# Patient Record
Sex: MALE | Race: WHITE | NOT HISPANIC OR LATINO | Employment: UNEMPLOYED | URBAN - METROPOLITAN AREA
[De-identification: names, ages, dates, MRNs, and addresses within clinical notes are randomized per-mention and may not be internally consistent; named-entity substitution may affect disease eponyms.]

---

## 2020-09-22 ENCOUNTER — TELEPHONE (OUTPATIENT)
Dept: PRIMARY CARE | Facility: CLINIC | Age: 30
End: 2020-09-22

## 2020-09-22 NOTE — TELEPHONE ENCOUNTER
Pt is scheduled for 10/19/2020 for a new pt visit. Asking if it is ok if he brings an emotional support person with him that day. I told him I would ask since we are requesting that pt's come alone due to COVID.

## 2020-10-19 ENCOUNTER — OFFICE VISIT (OUTPATIENT)
Dept: PRIMARY CARE | Facility: CLINIC | Age: 30
End: 2020-10-19
Payer: COMMERCIAL

## 2020-10-19 VITALS
HEART RATE: 80 BPM | DIASTOLIC BLOOD PRESSURE: 80 MMHG | TEMPERATURE: 97 F | WEIGHT: 171 LBS | BODY MASS INDEX: 23.94 KG/M2 | SYSTOLIC BLOOD PRESSURE: 140 MMHG | HEIGHT: 71 IN

## 2020-10-19 DIAGNOSIS — R53.83 FATIGUE, UNSPECIFIED TYPE: ICD-10-CM

## 2020-10-19 DIAGNOSIS — F41.9 ANXIETY AND DEPRESSION: ICD-10-CM

## 2020-10-19 DIAGNOSIS — F10.982 ALCOHOL-INDUCED INSOMNIA (CMS/HCC): ICD-10-CM

## 2020-10-19 DIAGNOSIS — Z23 NEED FOR IMMUNIZATION AGAINST INFLUENZA: ICD-10-CM

## 2020-10-19 DIAGNOSIS — Z00.01 ENCOUNTER FOR PREVENTATIVE ADULT HEALTH CARE EXAM WITH ABNORMAL FINDINGS: ICD-10-CM

## 2020-10-19 DIAGNOSIS — Z13.220 SCREENING CHOLESTEROL LEVEL: ICD-10-CM

## 2020-10-19 DIAGNOSIS — F10.220: Primary | ICD-10-CM

## 2020-10-19 DIAGNOSIS — F32.A ANXIETY AND DEPRESSION: ICD-10-CM

## 2020-10-19 PROBLEM — J30.89 ENVIRONMENTAL AND SEASONAL ALLERGIES: Status: ACTIVE | Noted: 2020-10-19

## 2020-10-19 PROCEDURE — 90686 IIV4 VACC NO PRSV 0.5 ML IM: CPT | Performed by: FAMILY MEDICINE

## 2020-10-19 PROCEDURE — 99204 OFFICE O/P NEW MOD 45 MIN: CPT | Mod: 25 | Performed by: FAMILY MEDICINE

## 2020-10-19 PROCEDURE — 90471 IMMUNIZATION ADMIN: CPT | Performed by: FAMILY MEDICINE

## 2020-10-19 RX ORDER — ESCITALOPRAM OXALATE 10 MG/1
10 TABLET ORAL DAILY
Qty: 30 TABLET | Refills: 2 | Status: SHIPPED | OUTPATIENT
Start: 2020-10-19 | End: 2020-11-16 | Stop reason: SDUPTHER

## 2020-10-19 SDOH — ECONOMIC STABILITY: FOOD INSECURITY: WITHIN THE PAST 12 MONTHS, THE FOOD YOU BOUGHT JUST DIDN'T LAST AND YOU DIDN'T HAVE MONEY TO GET MORE.: NEVER TRUE

## 2020-10-19 SDOH — HEALTH STABILITY: MENTAL HEALTH: HOW MANY DRINKS CONTAINING ALCOHOL DO YOU HAVE ON A TYPICAL DAY WHEN YOU ARE DRINKING?: 3 OR 4

## 2020-10-19 SDOH — ECONOMIC STABILITY: FOOD INSECURITY: WITHIN THE PAST 12 MONTHS, YOU WORRIED THAT YOUR FOOD WOULD RUN OUT BEFORE YOU GOT THE MONEY TO BUY MORE.: NEVER TRUE

## 2020-10-19 SDOH — HEALTH STABILITY: MENTAL HEALTH: HOW OFTEN DO YOU HAVE A DRINK CONTAINING ALCOHOL?: 4 OR MORE TIMES A WEEK

## 2020-10-19 SDOH — HEALTH STABILITY: MENTAL HEALTH: HOW OFTEN DO YOU HAVE SIX OR MORE DRINKS ON ONE OCCASION?: LESS THAN MONTHLY

## 2020-10-19 SDOH — ECONOMIC STABILITY: FOOD INSECURITY: HOW HARD IS IT FOR YOU TO PAY FOR THE VERY BASICS LIKE FOOD, HOUSING, MEDICAL CARE, AND HEATING?: NOT HARD AT ALL

## 2020-10-19 SDOH — ECONOMIC STABILITY: TRANSPORTATION INSECURITY: IN THE PAST 12 MONTHS, HAS LACK OF TRANSPORTATION KEPT YOU FROM MEDICAL APPOINTMENTS OR FROM GETTING MEDICATIONS?: NO

## 2020-10-19 ASSESSMENT — ENCOUNTER SYMPTOMS
ALLERGIC/IMMUNOLOGIC NEGATIVE: 1
RESPIRATORY NEGATIVE: 1
FATIGUE: 1
CARDIOVASCULAR NEGATIVE: 1
FEVER: 0
GASTROINTESTINAL NEGATIVE: 1
EYES NEGATIVE: 1
MUSCULOSKELETAL NEGATIVE: 1
HEMATOLOGIC/LYMPHATIC NEGATIVE: 1
CHILLS: 0
UNEXPECTED WEIGHT CHANGE: 1
SEIZURES: 0
ENDOCRINE NEGATIVE: 1
NEUROLOGICAL NEGATIVE: 1
PSYCHIATRIC NEGATIVE: 1

## 2020-10-19 ASSESSMENT — ACTIVITIES OF DAILY LIVING (ADL): LACK_OF_TRANSPORTATION: NO

## 2020-10-19 NOTE — PROGRESS NOTES
"Subjective      Patient ID: Jeff Hobbs is a 30 y.o. male     HPI: He presents today to establish care with this practice.  He reports that he feels generally a lower level of energy, he also reports difficulty falling asleep, however once he is asleep he is able to stay asleep.  He also expresses concern about his use of alcohol especially in the evenings because he feels as though he needs to have an alcoholic beverage just to be able to fall asleep this has been ongoing for 7 years or so.  Feels as though he has developed a degree of dependence on alcohol.  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  He does plan on getting the flu shot today.    He reports that in April and May, he tells me that he was vomiting just about every day during that.  And experienced some weight loss at least 15 pounds.    The following have been reviewed and updated as appropriate in this visit:  Tobacco  Allergies  Meds  Problems  Surg Hx  Fam Hx  Soc Hx      Review of Systems   Constitutional: Positive for fatigue and unexpected weight change. Negative for chills and fever.   HENT: Negative.    Eyes: Negative.    Respiratory: Negative.    Cardiovascular: Negative.    Gastrointestinal: Negative.    Endocrine: Negative.    Genitourinary: Negative.    Musculoskeletal: Negative.    Skin: Negative.    Allergic/Immunologic: Negative.    Neurological: Negative.  Negative for seizures.   Hematological: Negative.    Psychiatric/Behavioral: Negative.      Vitals:    10/19/20 1335   BP: 140/80   BP Location: Left upper arm   Patient Position: Sitting   Pulse: 80   Temp: 36.1 °C (97 °F)   Weight: 77.6 kg (171 lb)   Height: 1.803 m (5' 11\")      Current Outpatient Medications   Medication Sig Dispense Refill   • escitalopram (LEXAPRO) 10 mg tablet Take 1 tablet (10 mg total) by mouth daily. 30 tablet 2     No current facility-administered medications for this " visit.       Social History     Tobacco Use   • Smoking status: Current Every Day Smoker     Types: Electronic Cigarette   • Smokeless tobacco: Never Used   Substance Use Topics   • Alcohol use: Yes     Alcohol/week: 14.0 standard drinks     Types: 14 Shots of liquor per week     Frequency: 4 or more times a week     Drinks per session: 3 or 4     Binge frequency: Less than monthly     Comment: His girlfriend reports that if he does not have some alcoholic beverages especially in the evening he cannot sleep.   • Drug use: Never      Family History   Problem Relation Age of Onset   • Diabetes Biological Mother    • Heart disease Biological Mother    • No Known Problems Biological Father    • No Known Problems Biological Brother    • Heart disease Maternal Grandmother         No past medical history on file.     Objective     Physical Exam  Constitutional:       Appearance: Normal appearance. He is normal weight.   HENT:      Right Ear: Tympanic membrane, ear canal and external ear normal. There is no impacted cerumen.      Left Ear: Tympanic membrane, ear canal and external ear normal. There is no impacted cerumen.      Nose: Nose normal.      Mouth/Throat:      Mouth: Mucous membranes are moist.   Eyes:      Conjunctiva/sclera: Conjunctivae normal.   Neck:      Musculoskeletal: Neck supple.   Cardiovascular:      Rate and Rhythm: Normal rate and regular rhythm.      Heart sounds: Normal heart sounds.   Pulmonary:      Effort: Pulmonary effort is normal.      Breath sounds: Normal breath sounds.   Abdominal:      Palpations: Abdomen is soft.   Skin:     General: Skin is warm.   Neurological:      Mental Status: He is alert and oriented to person, place, and time.   Psychiatric:         Mood and Affect: Mood normal.         Behavior: Behavior normal.         Thought Content: Thought content normal.         Judgment: Judgment normal.         Problem List Items Addressed This Visit        Nervous    Alcohol-induced  insomnia (CMS/HCC)    Relevant Medications    escitalopram (LEXAPRO) 10 mg tablet    Other Relevant Orders    Comprehensive metabolic panel    Alcohol dependence with acute alcoholic intoxication without complication (CMS/HCC)    Relevant Medications    escitalopram (LEXAPRO) 10 mg tablet       Other    Fatigue    Relevant Orders    CBC    TSH w reflex FT4    Anxiety and depression    Relevant Medications    escitalopram (LEXAPRO) 10 mg tablet      Other Visit Diagnoses     Encounter for preventative adult health care examination    -  Primary    Relevant Orders    Influenza vaccine quadrivalent preservative free 6 month and older IM    Lipid panel    Need for immunization against influenza        Relevant Orders    Influenza vaccine quadrivalent preservative free 6 month and older IM          Assessment/Plan     Adult preventive exam: He presents as a new patient today.  We discussed a variety of issues including disease and injury prevention.  He will be getting the flu shot today and we discussed alcohol and tobacco use, he does want to eventually quit drinking alcohol and he does acknowledge that it has become a serious problem.  He also smokes and currently uses electronic cigarettes although he did smoke regular cigarettes prior to this.  As the importance of alcohol and tobacco cessation on his long-term health.    Decreased energy: I will check blood work as noted to make sure there is not a hematologic or metabolic cause for his symptoms.  He does have underlying insomnia and this certainly could be playing a role.  He admits that alcohol is necessary for him to fall asleep but once he is asleep he is able to maintain sleep.  He has tried melatonin in the past and I would like for him to start using this again.    Anxiety/depression: Ports that he is always been somewhat of an anxious person but I do believe that there is some underlying depression as well.  We discussed this and I do believe that he would  benefit from a low-dose SSRI.  I will go ahead and put in a prescription for Lexapro and I explained to him expectations and how to take this medication.  We will see each other in about 4 weeks for very close follow-up.    Alcohol dependence: We discussed the importance of ultimately quitting alcohol together and the positive impact successful cessation will have on his long-term life expectancy.  At this juncture I need to check his blood work and we did briefly discuss some options available to help with alcohol cessation.  He advised to start gradually cutting back on the quantity of alcohol that he uses moving forward.  He is to avoid any kind of cold turkey treatments.

## 2020-10-19 NOTE — PATIENT INSTRUCTIONS
VACCINE INFORMATION STATEMENT     Influenza (Flu) Vaccine (Inactivated or Recombinant): What you need to know     Many Vaccine Information Statements are available in [place] and other languages. See  www.immunize.org/vis    Hojas de información sobre vacunas están disponibles en español y en muchos otrosidiomas. Visite www.immunize.org/vis     1. Why Get Vaccinated?  Influenza vaccine can prevent influenza (flu).  Flu is a contagious disease that spreads around the [place] every year, usually between October and May. Anyone can get the flu, but it is more dangerous for some people. Infants and young children, people 65 years of age and older, pregnant women, and people with certain health conditions or a weakened immune system are at greatest risk of flu complications.  Pneumonia, bronchitis, sinus infections and ear infections are examples of flu-related complications. If you have a medical condition, such as heart disease, cancer or diabetes, flu can make it worse.  Flu can cause fever and chills, sore throat, muscle aches, fatigue, cough, headache, and runny or stuffy nose. Some people may have vomiting and diarrhea, though this is more common in children than adults.  Each year thousands of people in the [place] die from flu, and many more are hospitalized. Flu vaccine prevents millions of illnesses and flu-related visits to the doctor each year.    2. Influenza vaccine  CDC recommends everyone 6 months of age and older get vaccinated every flu season. Children  6 months through 8 years of age may need 2 doses during a single flu season. Everyone else needs only 1 dose each flu season.  It takes about 2 weeks for protection to develop after vaccination.  There are many flu viruses, and they are always changing. Each year a new flu vaccine is made to protect against three or four viruses that are likely to cause disease in the upcoming flu season. Even when the vaccine doesn't exactly match  these viruses, it may still provide some protection.  Influenza vaccine does not cause flu.  Influenza vaccine may be given at the same time as other vaccines.    3. Talk with your health care provider   Tell your vaccine provider if the person getting the vaccine:  Has had an allergic reaction after a previous dose of influenza vaccine, or has any severe, life- threatening allergies.  Has ever had Guillain-Barré Syndrome (also called GBS).  In some cases, your health care provider may decide to postpone influenza vaccination to a future visit.  People with minor illnesses, such as a cold, may be vaccinated. People who are moderately or severely ill should usually wait until they recover before getting influenza vaccine.  Your health care provider can give you more information.    4. Risks of vaccine reaction  Soreness, redness, and swelling where shot is given, fever, muscle aches, and headache can happen after influenza vaccine.  There may be a very small increased risk of Guillain-Barré Syndrome (GBS) after inactivated influenza vaccine (the flu shot).      Young children who get the flu shot along with pneumococcal vaccine (PCV13), and/or DTaP vaccine at the same time might be slightly more likely to have a seizure caused by fever. Tell your health care provider if a child who is getting flu vaccine has ever had a seizure.  People sometimes faint after medical procedures, including vaccination. Tell your provider if you feel dizzy or have vision changes or ringing in the ears.  As with any medicine, there is a very remote chance of a vaccine causing a severe allergic reaction, other serious injury, or death.     5. What if there is a serious problem?  An allergic reaction could occur after the vaccinated person leaves the clinic. If you see signs of a  severe allergic reaction (hives, swelling of the face and throat, difficulty breathing, a fast heartbeat, dizziness, or weakness), call 9-1-1 and get the person  to the nearest hospital.  For other signs that concern you, call your health care provider.  Adverse reactions should be reported to the Vaccine Adverse Event Reporting System (VAERS). Your health care provider will usually file this report, or you can do it yourself. Visit the VAERS website at www.vaers.[facility].gov or call 1-777.513.5404. VAERS is only for reporting reactions, and VAERS staff do not give medical advice.    6. The National Vaccine Injury Compensation Program  The National Vaccine Injury Compensation Program (VICP) is a federal program that was created to compensate people who may have been injured by certain vaccines. Visit the VICP website at www.[facility].gov/vaccinecompensation or call 1-236.956.9711 to learn about the program and about filing a claim. There is a time limit to file a claim for compensation.    7. How can I learn more?  Ask your healthcare provider.  Call your local or state health department.  Contact the Centers for Disease Control and Prevention (CDC):  - Call 1-699.628.2330 (3-315-XGB-INFO) or  - Visit CDC's www.cdc.gov/flu       U.S. Department of  Health and Human Services  Centers for Disease Control and Prevention  Vaccine Information Statement (Interim)  Inactivated Influenza Vaccine  8/15/2019  42 U.S.C. § 300aa-26

## 2020-11-05 LAB
ALBUMIN SERPL-MCNC: 5.2 G/DL (ref 4.1–5.2)
ALBUMIN/GLOB SERPL: 2.3 {RATIO} (ref 1.2–2.2)
ALP SERPL-CCNC: 49 IU/L (ref 39–117)
ALT SERPL-CCNC: 32 IU/L (ref 0–44)
AST SERPL-CCNC: 45 IU/L (ref 0–40)
BASOPHILS # BLD AUTO: 0.1 X10E3/UL (ref 0–0.2)
BASOPHILS NFR BLD AUTO: 2 %
BILIRUB SERPL-MCNC: 1.3 MG/DL (ref 0–1.2)
BUN SERPL-MCNC: 9 MG/DL (ref 6–20)
BUN/CREAT SERPL: 10 (ref 9–20)
CALCIUM SERPL-MCNC: 9.6 MG/DL (ref 8.7–10.2)
CHLORIDE SERPL-SCNC: 99 MMOL/L (ref 96–106)
CHOLEST SERPL-MCNC: 203 MG/DL (ref 100–199)
CO2 SERPL-SCNC: 26 MMOL/L (ref 20–29)
CREAT SERPL-MCNC: 0.86 MG/DL (ref 0.76–1.27)
EOSINOPHIL # BLD AUTO: 0 X10E3/UL (ref 0–0.4)
EOSINOPHIL NFR BLD AUTO: 1 %
ERYTHROCYTE [DISTWIDTH] IN BLOOD BY AUTOMATED COUNT: 14.4 % (ref 11.6–15.4)
GLOBULIN SER CALC-MCNC: 2.3 G/DL (ref 1.5–4.5)
GLUCOSE SERPL-MCNC: 92 MG/DL (ref 65–99)
HCT VFR BLD AUTO: 45.7 % (ref 37.5–51)
HDLC SERPL-MCNC: 61 MG/DL
HGB BLD-MCNC: 16.1 G/DL (ref 13–17.7)
IMM GRANULOCYTES # BLD AUTO: 0 X10E3/UL (ref 0–0.1)
IMM GRANULOCYTES NFR BLD AUTO: 0 %
LAB CORP EGFR IF AFRICN AM: 136 ML/MIN/1.73
LAB CORP EGFR IF NONAFRICN AM: 118 ML/MIN/1.73
LDLC SERPL CALC-MCNC: 71 MG/DL (ref 0–99)
LYMPHOCYTES # BLD AUTO: 1.3 X10E3/UL (ref 0.7–3.1)
LYMPHOCYTES NFR BLD AUTO: 40 %
MCH RBC QN AUTO: 35.9 PG (ref 26.6–33)
MCHC RBC AUTO-ENTMCNC: 35.2 G/DL (ref 31.5–35.7)
MCV RBC AUTO: 102 FL (ref 79–97)
MONOCYTES # BLD AUTO: 0.3 X10E3/UL (ref 0.1–0.9)
MONOCYTES NFR BLD AUTO: 8 %
NEUTROPHILS # BLD AUTO: 1.6 X10E3/UL (ref 1.4–7)
NEUTROPHILS NFR BLD AUTO: 49 %
PLATELET # BLD AUTO: 284 X10E3/UL (ref 150–450)
POTASSIUM SERPL-SCNC: 4.3 MMOL/L (ref 3.5–5.2)
PROT SERPL-MCNC: 7.5 G/DL (ref 6–8.5)
RBC # BLD AUTO: 4.49 X10E6/UL (ref 4.14–5.8)
SODIUM SERPL-SCNC: 139 MMOL/L (ref 134–144)
SPECIMEN STATUS: NORMAL
TRIGL SERPL-MCNC: 455 MG/DL (ref 0–149)
TSH SERPL DL<=0.005 MIU/L-ACNC: 0.8 UIU/ML (ref 0.45–4.5)
VLDLC SERPL CALC-MCNC: 71 MG/DL (ref 5–40)
WBC # BLD AUTO: 3.2 X10E3/UL (ref 3.4–10.8)

## 2020-11-16 ENCOUNTER — OFFICE VISIT (OUTPATIENT)
Dept: PRIMARY CARE | Facility: CLINIC | Age: 30
End: 2020-11-16
Payer: COMMERCIAL

## 2020-11-16 VITALS
DIASTOLIC BLOOD PRESSURE: 82 MMHG | BODY MASS INDEX: 23.57 KG/M2 | WEIGHT: 169 LBS | SYSTOLIC BLOOD PRESSURE: 122 MMHG | HEART RATE: 87 BPM | OXYGEN SATURATION: 98 %

## 2020-11-16 DIAGNOSIS — F32.A ANXIETY AND DEPRESSION: Primary | ICD-10-CM

## 2020-11-16 DIAGNOSIS — F10.220: ICD-10-CM

## 2020-11-16 DIAGNOSIS — F41.9 ANXIETY AND DEPRESSION: Primary | ICD-10-CM

## 2020-11-16 PROCEDURE — 99213 OFFICE O/P EST LOW 20 MIN: CPT | Performed by: FAMILY MEDICINE

## 2020-11-16 RX ORDER — ESCITALOPRAM OXALATE 20 MG/1
20 TABLET ORAL DAILY
Qty: 30 TABLET | Refills: 2 | Status: SHIPPED | OUTPATIENT
Start: 2020-11-16 | End: 2020-12-28

## 2020-11-16 ASSESSMENT — ENCOUNTER SYMPTOMS
ALLERGIC/IMMUNOLOGIC NEGATIVE: 1
NERVOUS/ANXIOUS: 1
EYES NEGATIVE: 1
HEMATOLOGIC/LYMPHATIC NEGATIVE: 1
RESPIRATORY NEGATIVE: 1
NEUROLOGICAL NEGATIVE: 1
ENDOCRINE NEGATIVE: 1
GASTROINTESTINAL NEGATIVE: 1
MUSCULOSKELETAL NEGATIVE: 1
CARDIOVASCULAR NEGATIVE: 1
CONSTITUTIONAL NEGATIVE: 1

## 2020-11-16 NOTE — PROGRESS NOTES
Subjective      Patient ID: Jeff Hobbs is a 30 y.o. male     HPI:  Here for follow up for alcohol dependence and anxiety.  He has not yet been in contact with drug and alcohol rehabilitation services although he is certainly strongly encouraged to do so.  He has started taking Lexapro for anxiety and does report that he has not really noticed any difference.  Has been on this medication for 1 month now and does not report any undue side effects.    The following have been reviewed and updated as appropriate in this visit:  Meds  Problems       Review of Systems   Constitutional: Negative.    HENT: Negative.    Eyes: Negative.    Respiratory: Negative.    Cardiovascular: Negative.    Gastrointestinal: Negative.    Endocrine: Negative.    Genitourinary: Negative.    Musculoskeletal: Negative.    Skin: Negative.    Allergic/Immunologic: Negative.    Neurological: Negative.    Hematological: Negative.    Psychiatric/Behavioral: The patient is nervous/anxious.    All other systems reviewed and are negative.    Vitals:    11/16/20 1335   BP: 122/82   BP Location: Left upper arm   Patient Position: Sitting   Pulse: 87   SpO2: 98%   Weight: 76.7 kg (169 lb)      Current Outpatient Medications   Medication Sig Dispense Refill   • escitalopram (LEXAPRO) 20 mg tablet Take 1 tablet (20 mg total) by mouth daily. 30 tablet 2     No current facility-administered medications for this visit.       Social History     Tobacco Use   • Smoking status: Current Every Day Smoker     Types: Electronic Cigarette   • Smokeless tobacco: Never Used   Substance Use Topics   • Alcohol use: Yes     Alcohol/week: 14.0 standard drinks     Types: 14 Shots of liquor per week     Frequency: 4 or more times a week     Drinks per session: 3 or 4     Binge frequency: Less than monthly     Comment: His girlfriend reports that if he does not have some alcoholic beverages especially in the evening he cannot sleep.   • Drug use: Never      Family  History   Problem Relation Age of Onset   • Diabetes Biological Mother    • Heart disease Biological Mother    • No Known Problems Biological Father    • No Known Problems Biological Brother    • Heart disease Maternal Grandmother         History reviewed. No pertinent past medical history.     Objective     Physical Exam  Constitutional:       Appearance: Normal appearance.   Cardiovascular:      Rate and Rhythm: Normal rate and regular rhythm.      Heart sounds: Normal heart sounds.   Pulmonary:      Effort: Pulmonary effort is normal.      Breath sounds: Normal breath sounds.   Skin:     General: Skin is warm.   Neurological:      Mental Status: He is alert and oriented to person, place, and time.   Psychiatric:         Mood and Affect: Mood normal.         Problem List Items Addressed This Visit        Nervous    Alcohol dependence with acute alcoholic intoxication without complication (CMS/HCC)    Relevant Medications    escitalopram (LEXAPRO) 20 mg tablet       Other    Anxiety and depression - Primary    Relevant Medications    escitalopram (LEXAPRO) 20 mg tablet          Assessment/Plan     Anxiety and depression: As noted above he has not noticed any significant change with the low-dose Lexapro, we will increase it to 20 mg a day to see if this makes a difference.  I did ask him to call me in about 3 weeks to let me know how things are going.  Regarding his alcohol abuse, I again encouraged him to get in with the drug and rehab services, I did recommend [facility], he does have the contact information for this.

## 2020-12-03 ENCOUNTER — TELEPHONE (OUTPATIENT)
Dept: PRIMARY CARE | Facility: CLINIC | Age: 30
End: 2020-12-03

## 2020-12-03 NOTE — TELEPHONE ENCOUNTER
Pt called to let the doctor know that things have not really changed. He would like to speak to someone.  [phone]

## 2020-12-28 ENCOUNTER — OFFICE VISIT (OUTPATIENT)
Dept: PRIMARY CARE | Facility: CLINIC | Age: 30
End: 2020-12-28
Payer: COMMERCIAL

## 2020-12-28 VITALS
WEIGHT: 170 LBS | DIASTOLIC BLOOD PRESSURE: 78 MMHG | TEMPERATURE: 97.8 F | SYSTOLIC BLOOD PRESSURE: 120 MMHG | BODY MASS INDEX: 23.8 KG/M2 | HEART RATE: 90 BPM | HEIGHT: 71 IN

## 2020-12-28 DIAGNOSIS — Z09 HOSPITAL DISCHARGE FOLLOW-UP: Primary | ICD-10-CM

## 2020-12-28 DIAGNOSIS — R56.9 ALCOHOL WITHDRAWAL SEIZURE WITHOUT COMPLICATION (CMS/HCC): ICD-10-CM

## 2020-12-28 DIAGNOSIS — F10.930 ALCOHOL WITHDRAWAL SEIZURE WITHOUT COMPLICATION (CMS/HCC): ICD-10-CM

## 2020-12-28 DIAGNOSIS — F10.220: ICD-10-CM

## 2020-12-28 PROCEDURE — 99495 TRANSJ CARE MGMT MOD F2F 14D: CPT | Performed by: FAMILY MEDICINE

## 2020-12-28 RX ORDER — FOLIC ACID 1 MG/1
1 TABLET ORAL DAILY
COMMUNITY
Start: 2020-12-19 | End: 2022-03-25 | Stop reason: SDUPTHER

## 2020-12-28 RX ORDER — ESCITALOPRAM OXALATE 10 MG/1
10 TABLET ORAL DAILY
COMMUNITY
Start: 2020-12-28 | End: 2021-01-06

## 2020-12-28 ASSESSMENT — ENCOUNTER SYMPTOMS
GASTROINTESTINAL NEGATIVE: 1
ENDOCRINE NEGATIVE: 1
SEIZURES: 1
MUSCULOSKELETAL NEGATIVE: 1
CARDIOVASCULAR NEGATIVE: 1
HEMATOLOGIC/LYMPHATIC NEGATIVE: 1
EYES NEGATIVE: 1
RESPIRATORY NEGATIVE: 1
ALLERGIC/IMMUNOLOGIC NEGATIVE: 1
CONSTITUTIONAL NEGATIVE: 1

## 2020-12-28 NOTE — PROGRESS NOTES
"Subjective      Patient ID: Jeff Hobbs is a 30 y.o. male     HPI: He comes in today for hospital discharge follow-up.  He was admitted to [facility] on December 18 until December 19, 2020, for evaluation of a witnessed seizure.  He was admitted to the critical care unit.  The seizure was witnessed by his fiancée having lasted approximately a couple of minutes.  He did have some confusion after the event and does not have any recollection of it he did have some tongue bruising that was noted but did not have any bowel or bladder incontinence.  He had not had a drink of vodka for the previous 15 hours or so.  Was placed on the alcohol withdrawal protocol and found to have acute kidney injury which responded well to hydration.  He also had a low potassium level and this was repleted.  His MRI did not show any acute intracranial abnormality.  Neurology saw the patient and did recommend an outpatient EEG    The following have been reviewed and updated as appropriate in this visit:  Meds  Problems       Review of Systems   Constitutional: Negative.    HENT: Negative.    Eyes: Negative.    Respiratory: Negative.    Cardiovascular: Negative.    Gastrointestinal: Negative.    Endocrine: Negative.    Genitourinary: Negative.    Musculoskeletal: Negative.    Skin: Negative.    Allergic/Immunologic: Negative.    Neurological: Positive for seizures (No further seizures since the initial event).   Hematological: Negative.    All other systems reviewed and are negative.    Vitals:    12/28/20 1450   BP: 120/78   BP Location: Left upper arm   Patient Position: Sitting   Pulse: 90   Temp: 36.6 °C (97.8 °F)   Weight: 77.1 kg (170 lb)   Height: 1.803 m (5' 11\")      Current Outpatient Medications   Medication Sig Dispense Refill   • folic acid (FOLVITE) 1 mg tablet Take 1 mg by mouth daily.     • multivitamin with minerals (DAILY MULTIVITAMIN-MINERALS) tablet Take 1 tablet by mouth daily.     • escitalopram " (LEXAPRO) 10 mg tablet Take 1 tablet (10 mg total) by mouth daily.       No current facility-administered medications for this visit.       Social History     Tobacco Use   • Smoking status: Current Every Day Smoker     Types: Electronic Cigarette   • Smokeless tobacco: Never Used   Substance Use Topics   • Alcohol use: Yes     Alcohol/week: 14.0 standard drinks     Types: 14 Shots of liquor per week     Frequency: 4 or more times a week     Drinks per session: 3 or 4     Binge frequency: Less than monthly     Comment: His girlfriend reports that if he does not have some alcoholic beverages especially in the evening he cannot sleep.   • Drug use: Never      Family History   Problem Relation Age of Onset   • Diabetes Biological Mother    • Heart disease Biological Mother    • No Known Problems Biological Father    • No Known Problems Biological Brother    • Heart disease Maternal Grandmother         No past medical history on file.     Objective     Physical Exam  Constitutional:       Appearance: Normal appearance.   Cardiovascular:      Rate and Rhythm: Normal rate and regular rhythm.      Heart sounds: Normal heart sounds.   Pulmonary:      Effort: Pulmonary effort is normal.      Breath sounds: Normal breath sounds.   Abdominal:      Palpations: Abdomen is soft.   Skin:     General: Skin is warm.   Neurological:      Mental Status: He is alert and oriented to person, place, and time.   Psychiatric:         Mood and Affect: Mood normal.         Problem List Items Addressed This Visit        Nervous    Alcohol dependence with acute alcoholic intoxication without complication (CMS/HCC)    Relevant Medications    escitalopram (LEXAPRO) 10 mg tablet    Other Relevant Orders    EEG      Other Visit Diagnoses     Hospital discharge follow-up    -  Primary    Alcohol withdrawal seizure without complication (CMS/HCC)        Relevant Orders    EEG    Ambulatory referral to Neurology          Assessment/[facility]     Hospital  discharge follow-up: See HPI for details.  He did experience an alcohol withdrawal seizure after not had any alcohol for about 15 hours or so.  He was admitted overnight to the [facility] and was seen by neurology.  His imaging studies did not reveal the presence of any acute changes and he had some metabolic abnormalities which proved with treatment.  Not had any seizure since his discharge from the hospital and he has been drinking and does plan on getting in with rehabilitation at the start of the new year.  We have discussed this in the past.  Neurology has reported this event to the Department of Motor Vehicles and for now he is not driving, he will need to see the neurologist and a referral was made today and I also put in an order for an EEG which he needs to get done on an outpatient basis hopefully he will be able to get that done before his visit to the neurologist but I did encourage him to call neurology to find out if he should get it done before the visit or wait until he is seen by the neurologist.

## 2020-12-30 ENCOUNTER — TELEPHONE (OUTPATIENT)
Dept: PRIMARY CARE | Facility: CLINIC | Age: 30
End: 2020-12-30

## 2020-12-30 DIAGNOSIS — F10.930 ALCOHOL WITHDRAWAL SEIZURE WITHOUT COMPLICATION (CMS/HCC): Primary | ICD-10-CM

## 2020-12-30 DIAGNOSIS — R56.9 ALCOHOL WITHDRAWAL SEIZURE WITHOUT COMPLICATION (CMS/HCC): Primary | ICD-10-CM

## 2020-12-30 NOTE — TELEPHONE ENCOUNTER
Please let him know that I sent a referral for him to see Dr. Carrington, a different neurologist in [place]

## 2020-12-30 NOTE — TELEPHONE ENCOUNTER
Patient called in today he was here a couple of days ago and was referred to neurology    He called the neurology office and the doctor has recently retired    Any other doctor you would like him to see?

## 2021-01-06 RX ORDER — ESCITALOPRAM OXALATE 10 MG/1
TABLET ORAL
Qty: 30 TABLET | Refills: 2 | Status: SHIPPED | OUTPATIENT
Start: 2021-01-06 | End: 2021-09-27 | Stop reason: SDUPTHER

## 2021-01-06 NOTE — TELEPHONE ENCOUNTER
Medicine Refill Request    Last Office Visit: 12/28/2020  Last Telemedicine Visit: Visit date not found    Next Office Visit: Visit date not found  Next Telemedicine Visit: Visit date not found         Current Outpatient Medications:   •  escitalopram (LEXAPRO) 10 mg tablet, Take 1 tablet (10 mg total) by mouth daily., Disp: , Rfl:   •  folic acid (FOLVITE) 1 mg tablet, Take 1 mg by mouth daily., Disp: , Rfl:   •  multivitamin with minerals (DAILY MULTIVITAMIN-MINERALS) tablet, Take 1 tablet by mouth daily., Disp: , Rfl:       BP Readings from Last 3 Encounters:   12/28/20 120/78   11/16/20 122/82   10/19/20 140/80       Recent Lab results:  Lab Results   Component Value Date    CHOL 203 (H) 11/04/2020   ,   Lab Results   Component Value Date    HDL 61 11/04/2020   ,   Lab Results   Component Value Date    LDLCALC 71 11/04/2020   ,   Lab Results   Component Value Date    TRIG 455 (H) 11/04/2020        Lab Results   Component Value Date    GLUCOSE 92 11/04/2020   , No results found for: HGBA1C      Lab Results   Component Value Date    CREATININE 0.86 11/04/2020       Lab Results   Component Value Date    TSH 0.801 11/04/2020

## 2021-01-18 ENCOUNTER — LAB REQUISITION (OUTPATIENT)
Dept: LAB | Facility: HOSPITAL | Age: 31
End: 2021-01-18
Attending: NURSE PRACTITIONER
Payer: COMMERCIAL

## 2021-01-18 DIAGNOSIS — F10.239 ALCOHOL DEPENDENCE WITH WITHDRAWAL, UNSPECIFIED (CMS/HCC): ICD-10-CM

## 2021-01-18 LAB
ALBUMIN SERPL-MCNC: 4.2 G/DL (ref 3.4–5)
ALP SERPL-CCNC: 45 IU/L (ref 35–126)
ALT SERPL-CCNC: 38 IU/L (ref 16–63)
AST SERPL-CCNC: 29 IU/L (ref 15–41)
BILIRUB DIRECT SERPL-MCNC: 0.2 MG/DL
BILIRUB SERPL-MCNC: 0.7 MG/DL (ref 0.3–1.2)
PROT SERPL-MCNC: 6.5 G/DL (ref 6–8.2)

## 2021-01-18 PROCEDURE — 80076 HEPATIC FUNCTION PANEL: CPT | Performed by: NURSE PRACTITIONER

## 2021-01-18 PROCEDURE — 36415 COLL VENOUS BLD VENIPUNCTURE: CPT | Performed by: NURSE PRACTITIONER

## 2021-03-02 ENCOUNTER — TELEMEDICINE (OUTPATIENT)
Dept: PRIMARY CARE | Facility: CLINIC | Age: 31
End: 2021-03-02
Payer: COMMERCIAL

## 2021-03-02 DIAGNOSIS — F10.930 ALCOHOL WITHDRAWAL SEIZURE WITHOUT COMPLICATION (CMS/HCC): ICD-10-CM

## 2021-03-02 DIAGNOSIS — F10.288 ALCOHOL DEPENDENCE WITH OTHER ALCOHOL-INDUCED DISORDER: Primary | ICD-10-CM

## 2021-03-02 DIAGNOSIS — R56.9 ALCOHOL WITHDRAWAL SEIZURE WITHOUT COMPLICATION (CMS/HCC): ICD-10-CM

## 2021-03-02 PROBLEM — F10.939 ALCOHOL WITHDRAWAL SEIZURE (CMS/HCC): Status: ACTIVE | Noted: 2021-03-02

## 2021-03-02 PROCEDURE — 99213 OFFICE O/P EST LOW 20 MIN: CPT | Mod: 95 | Performed by: FAMILY MEDICINE

## 2021-03-02 NOTE — PROGRESS NOTES
Verification of Patient Location:  The patient affirms they are currently located in the following state: [place]    Request for Consent:    Video Encounter   Hello, my name is Javier Murphy MD.  Before we proceed, can you please verify your identification by telling me your full name and date of birth?  Can you tell me who is in the room with you?    You and I are about to have a telemedicine check-in or visit because you have requested it.  This is a live video-conference.  I am a real person, speaking to you in real time.  There is no one else with me on the video-conference.  However, when we use ([employer], [employer], etc) it is important for you to know that the video-conference may not be secure or private.  I am not recording this conversation and I am asking you not to record it.  This telemedicine visit will be billed to your health insurance or you, if you are self-insured.  You understand you will be responsible for any copayments or coinsurances that apply to your telemedicine visit.  Communication platform used for this encounter:  D[employer]ity     Before starting our telemedicine visit, I am required to get your consent for this virtual check-in or visit by telemedicine. Do you consent?      Patient Response to Request for Consent:  Yes      Visit Documentation:  Subjective     Patient ID: Jeff Hobbs is a 30 y.o. male.  1990      HPI:  Finished rehab on 2/5, then went into outpt through [facility].  Was initially sober but relapsed, perhaps 1.5 weeks ago, unfortunately.  He has continued with [facility].  Last night he did tell them about relapse.  His girlfriend discovered his drinking in hiding this past weekend.  He is drinking two tall glasses, half full, of vodka.  Prior to rehab was drinking three half glasses of vodka daily.  [facility] encouraged him to continue with the program, told him he can go to ED for detox, or try to taper, or reentering inpatient.  His last drink was this AM, he did  have some shaking today.  Today had a half a tall glass of vodka, but finished about half of this today.  Not shaking now.  His counselor is Mahi Jalloh, telephone number .     His biggest concern would be to get insurance coverage to get back into inpatient rehab.         The following have been reviewed and updated as appropriate in this visit:  Tobacco  Allergies  Meds  Problems  Med Hx  Surg Hx  Fam Hx       Review of Systems    As per HPI, all other ROS negative    Assessment/Plan   Diagnoses and all orders for this visit:    Alcohol dependence with other alcohol-induced disorder (CMS/HCC) (Primary)    Alcohol withdrawal seizure without complication (CMS/HCC)    Alcohol dependence: Recent relapse, see HPI for further details.  I spoke to his counselor about this issue and we both agree that readmission to inpatient rehab is our best bet at this time.  Given his underlying alcohol withdrawal seizures recently diagnosed, the concern here is that he will experience new seizures.  There are other options such as emergency room presentation for detox treatment or trying to taper on his own.  I think he is going to have less chance of successfully tapering down on his own and stopping drinking but he understands that the risk of a seizure given his relapse is rather elevated.  His counselor Mahi will call him and get back to me later this evening with an update.  If necessary I may need to prescribe clonidine combined with Librium although we both agree that this is less desired.  Hopefully he will agree to go to inpatient rehab and she may be able to get him back in rather quickly.    Time Spent:  I spent 20 minutes on this date of service performing the following activities: obtaining history, entering orders, documenting, preparing for visit, obtaining / reviewing records and providing counseling and education.

## 2021-03-23 ENCOUNTER — LAB REQUISITION (OUTPATIENT)
Dept: LAB | Facility: HOSPITAL | Age: 31
End: 2021-03-23
Attending: NURSE PRACTITIONER
Payer: COMMERCIAL

## 2021-03-23 DIAGNOSIS — F10.20 ALCOHOL DEPENDENCE, UNCOMPLICATED (CMS/HCC): ICD-10-CM

## 2021-03-23 LAB
ALBUMIN SERPL-MCNC: 4.5 G/DL (ref 3.4–5)
ALP SERPL-CCNC: 49 IU/L (ref 35–126)
ALT SERPL-CCNC: 22 IU/L (ref 16–63)
AST SERPL-CCNC: 21 IU/L (ref 15–41)
BILIRUB DIRECT SERPL-MCNC: 0.1 MG/DL
BILIRUB SERPL-MCNC: 0.6 MG/DL (ref 0.3–1.2)
PROT SERPL-MCNC: 6.9 G/DL (ref 6–8.2)

## 2021-03-23 PROCEDURE — 80076 HEPATIC FUNCTION PANEL: CPT | Performed by: NURSE PRACTITIONER

## 2021-03-23 PROCEDURE — 36415 COLL VENOUS BLD VENIPUNCTURE: CPT | Performed by: NURSE PRACTITIONER

## 2021-09-08 ENCOUNTER — IMMUNIZATION (OUTPATIENT)
Dept: IMMUNIZATION | Facility: CLINIC | Age: 31
End: 2021-09-08
Payer: COMMERCIAL

## 2021-09-08 PROCEDURE — 0001A COVID-19 (SARS-COV-2) VACCINE, PFIZER: CPT | Performed by: HOSPITALIST

## 2021-09-08 PROCEDURE — 91300 COVID-19 (SARS-COV-2) VACCINE, PFIZER: CPT | Performed by: HOSPITALIST

## 2021-09-08 NOTE — PROGRESS NOTES
Patient presents today for COVID-19 vaccination.     Pre-Vaccination Patient Screenin. Do you currently have symptoms of COVID-19, including fever of greater than 100.4, cough, loss of smell/taste, etc.? no    2. Have you been diagnosed with COVID-19 in the last 30 days? no     3. Are you currently quarantined, furloughed, or self-monitoring due to a COVID-19 exposure?   no    4. Have you already received an initial dose of COVID-19 vaccine elsewhere?   no     5. You have been provided the opportunity to review the ingredients of the COVID-19 vaccine that you are receiving today.  Are you allergic to any of the ingredients in the [employer] COVID-19 vaccine? no    6. Have you ever had a severe allergic reaction that required medical treatment or intervention? no    Patient Verbal Acknowledgement:     Do you verbally acknowledge that you have received, read, and understand the information provided in the Fact Sheet for Recipients and Caregivers and that your questions about the vaccine have been answered? yes      Appropriateness for Vaccination:     Based on the responses to the pre-vaccination screening, the patient is deemed medically appropriate to receive the COVID-19 vaccine today.     Post Vaccination Observation:    Patient to be observed for 15 minutes post vaccination.       Daniel Pisano LPN

## 2021-09-22 ENCOUNTER — TELEPHONE (OUTPATIENT)
Dept: PRIMARY CARE | Facility: CLINIC | Age: 31
End: 2021-09-22

## 2021-09-22 NOTE — TELEPHONE ENCOUNTER
Pt is at [facility] and they will not take his order for a lipid panel  from the Er.. Can we write this now. He is at [facility] in   [place] . ([facility])  Fax [phone]      [phone]

## 2021-09-22 NOTE — TELEPHONE ENCOUNTER
Pt was told by ED provider to get a repeat lipid profile in 2 weeks to check trig level. Pt is at [facility] and they said they do not take orders written by ED providers. Pt does have an appt with you on Monday 9/27/21. Would you like to order a lipid panel or prefer pt wait till he see you on Monday Please advise

## 2021-09-27 ENCOUNTER — OFFICE VISIT (OUTPATIENT)
Dept: PRIMARY CARE | Facility: CLINIC | Age: 31
End: 2021-09-27
Payer: COMMERCIAL

## 2021-09-27 VITALS
HEART RATE: 90 BPM | TEMPERATURE: 97.3 F | HEIGHT: 71 IN | WEIGHT: 158 LBS | DIASTOLIC BLOOD PRESSURE: 74 MMHG | BODY MASS INDEX: 22.12 KG/M2 | SYSTOLIC BLOOD PRESSURE: 118 MMHG

## 2021-09-27 DIAGNOSIS — F10.220: Primary | ICD-10-CM

## 2021-09-27 DIAGNOSIS — K85.20 ALCOHOL-INDUCED ACUTE PANCREATITIS WITHOUT INFECTION OR NECROSIS: ICD-10-CM

## 2021-09-27 DIAGNOSIS — F32.A ANXIETY AND DEPRESSION: ICD-10-CM

## 2021-09-27 DIAGNOSIS — F10.21: ICD-10-CM

## 2021-09-27 DIAGNOSIS — F41.9 ANXIETY AND DEPRESSION: ICD-10-CM

## 2021-09-27 DIAGNOSIS — E78.1 HYPERTRIGLYCERIDEMIA: ICD-10-CM

## 2021-09-27 PROBLEM — G47.09 OTHER INSOMNIA: Status: ACTIVE | Noted: 2021-09-27

## 2021-09-27 PROCEDURE — 99214 OFFICE O/P EST MOD 30 MIN: CPT | Performed by: FAMILY MEDICINE

## 2021-09-27 PROCEDURE — 3008F BODY MASS INDEX DOCD: CPT | Performed by: FAMILY MEDICINE

## 2021-09-27 RX ORDER — ESCITALOPRAM OXALATE 10 MG/1
15 TABLET ORAL DAILY
Qty: 135 TABLET | Refills: 1 | Status: SHIPPED | OUTPATIENT
Start: 2021-09-27 | End: 2022-01-24 | Stop reason: SDUPTHER

## 2021-09-27 ASSESSMENT — ENCOUNTER SYMPTOMS
MUSCULOSKELETAL NEGATIVE: 1
HEMATOLOGIC/LYMPHATIC NEGATIVE: 1
EYES NEGATIVE: 1
GASTROINTESTINAL NEGATIVE: 1
CARDIOVASCULAR NEGATIVE: 1
ALLERGIC/IMMUNOLOGIC NEGATIVE: 1
CONSTITUTIONAL NEGATIVE: 1
NEUROLOGICAL NEGATIVE: 1
ENDOCRINE NEGATIVE: 1
PSYCHIATRIC NEGATIVE: 1
RESPIRATORY NEGATIVE: 1

## 2021-09-27 NOTE — PROGRESS NOTES
"Subjective      Patient ID: Jeff Hobbs is a 30 y.o. male     HPI: He comes in today for follow-up.  He was hospitalized at [facility] from September 2 till September 4, 2021 for treatment of alcohol induced pancreatitis without infection or necrosis.  He was found to have elevated triglycerides and he responded well to treatment, initially his triglycerides were elevated but not as elevated as his August hospitalization.  In August his triglycerides were 2167, this hospitalization his triglyceride level was 583.  Nevertheless this hospitalization his lipase was 500 and he had epigastric pain.  He was initially treated with bowel rest and IV fluids, and pain control as well as withdrawal protocol.  He was advised of a low-fat low residual diet on discharge.  He has been abstinent from alcohol for 25 days now.  He has a sponsor with whom he has been meeting every 1 to 2 weeks, he is attending AA meetings, and has good family support.    The following have been reviewed and updated as appropriate in this visit:  Meds       Review of Systems   Constitutional: Negative.    HENT: Negative.    Eyes: Negative.    Respiratory: Negative.    Cardiovascular: Negative.    Gastrointestinal: Negative.    Endocrine: Negative.    Genitourinary: Negative.    Musculoskeletal: Negative.    Skin: Negative.    Allergic/Immunologic: Negative.    Neurological: Negative.    Hematological: Negative.    Psychiatric/Behavioral: Negative.    All other systems reviewed and are negative.    Vitals:    09/27/21 1308   BP: 118/74   BP Location: Left upper arm   Patient Position: Sitting   Pulse: 90   Temp: 36.3 °C (97.3 °F)   Weight: 71.7 kg (158 lb)   Height: 1.803 m (5' 11\")      Current Outpatient Medications   Medication Sig Dispense Refill   • escitalopram (LEXAPRO) 10 mg tablet Take 1.5 tablets (15 mg total) by mouth daily. 135 tablet 1   • folic acid (FOLVITE) 1 mg tablet Take 1 mg by mouth daily.     • multivitamin with " minerals (DAILY MULTIVITAMIN-MINERALS) tablet Take 1 tablet by mouth daily.       No current facility-administered medications for this visit.      Social History     Tobacco Use   • Smoking status: Current Every Day Smoker     Types: Electronic Cigarette   • Smokeless tobacco: Never Used   Substance Use Topics   • Alcohol use: Yes     Alcohol/week: 14.0 standard drinks     Types: 14 Shots of liquor per week     Comment: His girlfriend reports that if he does not have some alcoholic beverages especially in the evening he cannot sleep.   • Drug use: Never      Family History   Problem Relation Age of Onset   • Diabetes Biological Mother    • Heart disease Biological Mother    • No Known Problems Biological Father    • No Known Problems Biological Brother    • Heart disease Maternal Grandmother         History reviewed. No pertinent past medical history.     Objective     Physical Exam  Vitals reviewed.   Constitutional:       Appearance: Normal appearance. He is normal weight.   Cardiovascular:      Rate and Rhythm: Normal rate and regular rhythm.      Heart sounds: Normal heart sounds.   Pulmonary:      Effort: Pulmonary effort is normal.      Breath sounds: Normal breath sounds.   Abdominal:      Palpations: Abdomen is soft.      Tenderness: There is no abdominal tenderness.   Skin:     General: Skin is warm.   Neurological:      Mental Status: He is alert and oriented to person, place, and time.   Psychiatric:         Mood and Affect: Mood normal.         Problem List Items Addressed This Visit        Nervous    Alcohol dependence with acute alcoholic intoxication without complication (CMS/HCC) - Primary    Relevant Medications    escitalopram (LEXAPRO) 10 mg tablet    Severe alcohol dependence in early remission (CMS/HCC)    Relevant Medications    escitalopram (LEXAPRO) 10 mg tablet       Other    Anxiety and depression    Relevant Medications    escitalopram (LEXAPRO) 10 mg tablet      Other Visit Diagnoses      Alcohol-induced acute pancreatitis without infection or necrosis        Relevant Orders    Comprehensive metabolic panel    Hypertriglyceridemia        Relevant Orders    Lipid panel          Assessment/Plan     Dependence in early remission: He has been alcohol free for the past 25 days, I did congratulate him on this.  He is working with a counselor and does attend AA meetings which I encouraged him to continue.  He has good support from his parents.  We will continue to monitor this closely moving forward.  Acute pancreatitis/hypertriglyceridemia: His lipase level was over 500, since his discharge, abdominal symptoms have subsided.  I will see what his triglyceride level shows and will go from there.  The hospitalist recommended that he go on [facility], we will see where he stands and make a decision based on the lab data.  Anxiety depression: He has been on Lexapro and does report that it is helpful although we will increase the dose today.  We plan on seeing each other in about 5 or 6 weeks for close follow-up.

## 2021-09-29 ENCOUNTER — IMMUNIZATION (OUTPATIENT)
Dept: IMMUNIZATION | Facility: CLINIC | Age: 31
End: 2021-09-29
Attending: HOSPITALIST
Payer: COMMERCIAL

## 2021-09-29 LAB
ALBUMIN SERPL-MCNC: 4.7 G/DL (ref 4.1–5.2)
ALBUMIN/GLOB SERPL: 2.2 {RATIO} (ref 1.2–2.2)
ALP SERPL-CCNC: 47 IU/L (ref 44–121)
ALT SERPL-CCNC: 18 IU/L (ref 0–44)
AST SERPL-CCNC: 19 IU/L (ref 0–40)
BILIRUB SERPL-MCNC: 0.3 MG/DL (ref 0–1.2)
BUN SERPL-MCNC: 8 MG/DL (ref 6–20)
BUN/CREAT SERPL: 9 (ref 9–20)
CALCIUM SERPL-MCNC: 9.3 MG/DL (ref 8.7–10.2)
CHLORIDE SERPL-SCNC: 100 MMOL/L (ref 96–106)
CHOLEST SERPL-MCNC: 170 MG/DL (ref 100–199)
CO2 SERPL-SCNC: 24 MMOL/L (ref 20–29)
CREAT SERPL-MCNC: 0.93 MG/DL (ref 0.76–1.27)
GLOBULIN SER CALC-MCNC: 2.1 G/DL (ref 1.5–4.5)
GLUCOSE SERPL-MCNC: 80 MG/DL (ref 65–99)
HDLC SERPL-MCNC: 46 MG/DL
LAB CORP EGFR IF AFRICN AM: 129 ML/MIN/1.73
LAB CORP EGFR IF NONAFRICN AM: 111 ML/MIN/1.73
LDLC SERPL CALC-MCNC: 112 MG/DL (ref 0–99)
POTASSIUM SERPL-SCNC: 4.4 MMOL/L (ref 3.5–5.2)
PROT SERPL-MCNC: 6.8 G/DL (ref 6–8.5)
SODIUM SERPL-SCNC: 139 MMOL/L (ref 134–144)
TRIGL SERPL-MCNC: 59 MG/DL (ref 0–149)
VLDLC SERPL CALC-MCNC: 12 MG/DL (ref 5–40)

## 2021-09-29 PROCEDURE — 0002A COVID-19 (SARS-COV-2) VACCINE, PFIZER: CPT | Performed by: FAMILY MEDICINE

## 2021-09-29 PROCEDURE — 91300 COVID-19 (SARS-COV-2) VACCINE, PFIZER: CPT | Performed by: FAMILY MEDICINE

## 2021-09-29 NOTE — PROGRESS NOTES
Patient presents today for COVID-19 vaccination.     Pre-Vaccination Patient Screenin. Do you currently have symptoms of COVID-19, including fever of greater than 100.4, cough, loss of smell/taste, etc.? no    2. Have you been diagnosed with COVID-19 in the last 30 days? no     3. Are you currently quarantined, furloughed, or self-monitoring due to a COVID-19 exposure?   no    4. Have you already received an initial dose of COVID-19 vaccine elsewhere?   no     5. You have been provided the opportunity to review the ingredients of the COVID-19 vaccine that you are receiving today.  Are you allergic to any of the ingredients in the [employer] COVID-19 vaccine? no    6. Have you ever had a severe allergic reaction that required medical treatment or intervention? no    Patient Verbal Acknowledgement:     Do you verbally acknowledge that you have received, read, and understand the information provided in the Fact Sheet for Recipients and Caregivers and that your questions about the vaccine have been answered? yes      Appropriateness for Vaccination:     Based on the responses to the pre-vaccination screening, the patient is deemed medically appropriate to receive the COVID-19 vaccine today.     Post Vaccination Observation:    Patient to be observed for 15 minutes post vaccination.       Daniel Pisano LPN

## 2021-10-16 ENCOUNTER — HOSPITAL ENCOUNTER (INPATIENT)
Facility: HOSPITAL | Age: 31
LOS: 2 days | Discharge: HOME | DRG: 896 | End: 2021-10-18
Attending: EMERGENCY MEDICINE | Admitting: EMERGENCY MEDICINE
Payer: COMMERCIAL

## 2021-10-16 DIAGNOSIS — F41.9 ANXIETY: ICD-10-CM

## 2021-10-16 DIAGNOSIS — F10.20 ALCOHOL USE DISORDER, SEVERE, DEPENDENCE (HCC): ICD-10-CM

## 2021-10-16 DIAGNOSIS — F10.10 ALCOHOL ABUSE: Primary | ICD-10-CM

## 2021-10-16 PROBLEM — Z72.0 TOBACCO ABUSE: Status: ACTIVE | Noted: 2021-10-16

## 2021-10-16 PROBLEM — U07.1 COVID-19: Status: ACTIVE | Noted: 2021-10-16

## 2021-10-16 PROBLEM — F10.230 ALCOHOL WITHDRAWAL SYNDROME WITHOUT COMPLICATION (HCC): Status: ACTIVE | Noted: 2021-10-16

## 2021-10-16 PROBLEM — F10.930 ALCOHOL WITHDRAWAL SYNDROME WITHOUT COMPLICATION (HCC): Status: ACTIVE | Noted: 2021-10-16

## 2021-10-16 LAB
ALBUMIN SERPL BCP-MCNC: 4.5 G/DL (ref 3–5.2)
ALP SERPL-CCNC: 66 U/L (ref 43–122)
ALT SERPL W P-5'-P-CCNC: 27 U/L
AMPHETAMINES SERPL QL SCN: NEGATIVE
ANION GAP SERPL CALCULATED.3IONS-SCNC: 12 MMOL/L (ref 5–14)
AST SERPL W P-5'-P-CCNC: 49 U/L (ref 17–59)
BARBITURATES UR QL: NEGATIVE
BENZODIAZ UR QL: NEGATIVE
BILIRUB SERPL-MCNC: 2.08 MG/DL
BUN SERPL-MCNC: 11 MG/DL (ref 5–25)
CALCIUM SERPL-MCNC: 8.8 MG/DL (ref 8.4–10.2)
CHLORIDE SERPL-SCNC: 96 MMOL/L (ref 97–108)
CO2 SERPL-SCNC: 32 MMOL/L (ref 22–30)
COCAINE UR QL: NEGATIVE
CREAT SERPL-MCNC: 0.78 MG/DL (ref 0.7–1.5)
ERYTHROCYTE [DISTWIDTH] IN BLOOD BY AUTOMATED COUNT: 13.3 %
ETHANOL SERPL-MCNC: 250 MG/DL (ref 0–10)
GFR SERPL CREATININE-BSD FRML MDRD: 122 ML/MIN/1.73SQ M
GLUCOSE SERPL-MCNC: 91 MG/DL (ref 70–99)
HCT VFR BLD AUTO: 42.3 % (ref 41–53)
HGB BLD-MCNC: 14.7 G/DL (ref 13.5–17.5)
LIPASE SERPL-CCNC: 217 U/L (ref 23–300)
LYMPHOCYTES # BLD AUTO: 1.29 THOUSAND/UL (ref 0.5–4)
LYMPHOCYTES # BLD AUTO: 39 % (ref 25–45)
MCH RBC QN AUTO: 32.9 PG (ref 26–34)
MCHC RBC AUTO-ENTMCNC: 34.8 G/DL (ref 31–36)
MCV RBC AUTO: 95 FL (ref 80–100)
METHADONE UR QL: NEGATIVE
MONOCYTES # BLD AUTO: 0.43 THOUSAND/UL (ref 0.2–0.9)
MONOCYTES NFR BLD AUTO: 13 % (ref 1–10)
NEUTS SEG # BLD: 1.58 THOUSAND/UL (ref 1.8–7.8)
NEUTS SEG NFR BLD AUTO: 48 %
OPIATES UR QL SCN: NEGATIVE
OXYCODONE+OXYMORPHONE UR QL SCN: NEGATIVE
PCP UR QL: NEGATIVE
PLATELET # BLD AUTO: 184 THOUSANDS/UL (ref 150–450)
PLATELET BLD QL SMEAR: ADEQUATE
PMV BLD AUTO: 7 FL (ref 8.9–12.7)
POTASSIUM SERPL-SCNC: 3.1 MMOL/L (ref 3.6–5)
PROT SERPL-MCNC: 7.4 G/DL (ref 5.9–8.4)
RBC # BLD AUTO: 4.47 MILLION/UL (ref 4.5–5.9)
RBC MORPH BLD: NORMAL
SODIUM SERPL-SCNC: 140 MMOL/L (ref 137–147)
THC UR QL: NEGATIVE
TOTAL CELLS COUNTED SPEC: 100
WBC # BLD AUTO: 3.3 THOUSAND/UL (ref 4.5–11)

## 2021-10-16 PROCEDURE — 99284 EMERGENCY DEPT VISIT MOD MDM: CPT

## 2021-10-16 PROCEDURE — 80307 DRUG TEST PRSMV CHEM ANLYZR: CPT | Performed by: EMERGENCY MEDICINE

## 2021-10-16 PROCEDURE — 99282 EMERGENCY DEPT VISIT SF MDM: CPT | Performed by: EMERGENCY MEDICINE

## 2021-10-16 PROCEDURE — 82077 ASSAY SPEC XCP UR&BREATH IA: CPT | Performed by: EMERGENCY MEDICINE

## 2021-10-16 PROCEDURE — 85007 BL SMEAR W/DIFF WBC COUNT: CPT | Performed by: EMERGENCY MEDICINE

## 2021-10-16 PROCEDURE — HZ2ZZZZ DETOXIFICATION SERVICES FOR SUBSTANCE ABUSE TREATMENT: ICD-10-PCS | Performed by: EMERGENCY MEDICINE

## 2021-10-16 PROCEDURE — 93005 ELECTROCARDIOGRAM TRACING: CPT

## 2021-10-16 PROCEDURE — 83690 ASSAY OF LIPASE: CPT | Performed by: EMERGENCY MEDICINE

## 2021-10-16 PROCEDURE — 80053 COMPREHEN METABOLIC PANEL: CPT | Performed by: EMERGENCY MEDICINE

## 2021-10-16 PROCEDURE — 36415 COLL VENOUS BLD VENIPUNCTURE: CPT | Performed by: EMERGENCY MEDICINE

## 2021-10-16 PROCEDURE — 85027 COMPLETE CBC AUTOMATED: CPT | Performed by: EMERGENCY MEDICINE

## 2021-10-16 RX ORDER — SODIUM CHLORIDE 9 MG/ML
125 INJECTION, SOLUTION INTRAVENOUS CONTINUOUS
Status: DISCONTINUED | OUTPATIENT
Start: 2021-10-16 | End: 2021-10-17

## 2021-10-16 RX ORDER — ACETAMINOPHEN 325 MG/1
650 TABLET ORAL EVERY 6 HOURS PRN
Status: DISCONTINUED | OUTPATIENT
Start: 2021-10-16 | End: 2021-10-18 | Stop reason: HOSPADM

## 2021-10-16 RX ORDER — LANOLIN ALCOHOL/MO/W.PET/CERES
100 CREAM (GRAM) TOPICAL DAILY
Status: DISCONTINUED | OUTPATIENT
Start: 2021-10-17 | End: 2021-10-18 | Stop reason: HOSPADM

## 2021-10-16 RX ORDER — FOLIC ACID 1 MG/1
1 TABLET ORAL DAILY
Status: DISCONTINUED | OUTPATIENT
Start: 2021-10-17 | End: 2021-10-18 | Stop reason: HOSPADM

## 2021-10-16 RX ORDER — POTASSIUM CHLORIDE 750 MG/1
20 TABLET, EXTENDED RELEASE ORAL ONCE
Status: COMPLETED | OUTPATIENT
Start: 2021-10-16 | End: 2021-10-16

## 2021-10-16 RX ORDER — ONDANSETRON 2 MG/ML
4 INJECTION INTRAMUSCULAR; INTRAVENOUS EVERY 6 HOURS PRN
Status: DISCONTINUED | OUTPATIENT
Start: 2021-10-16 | End: 2021-10-18 | Stop reason: HOSPADM

## 2021-10-16 RX ORDER — TRAZODONE HYDROCHLORIDE 50 MG/1
50 TABLET ORAL
Status: DISCONTINUED | OUTPATIENT
Start: 2021-10-16 | End: 2021-10-18 | Stop reason: HOSPADM

## 2021-10-16 RX ADMIN — ONDANSETRON 4 MG: 2 INJECTION INTRAMUSCULAR; INTRAVENOUS at 23:50

## 2021-10-16 RX ADMIN — SODIUM CHLORIDE 125 ML/HR: 0.9 INJECTION, SOLUTION INTRAVENOUS at 23:33

## 2021-10-16 RX ADMIN — POTASSIUM CHLORIDE 20 MEQ: 750 TABLET, EXTENDED RELEASE ORAL at 22:52

## 2021-10-17 PROBLEM — E87.6 HYPOKALEMIA: Status: ACTIVE | Noted: 2021-10-17

## 2021-10-17 LAB
ALBUMIN SERPL BCP-MCNC: 3.8 G/DL (ref 3–5.2)
ALP SERPL-CCNC: 54 U/L (ref 43–122)
ALT SERPL W P-5'-P-CCNC: 23 U/L
ANION GAP SERPL CALCULATED.3IONS-SCNC: 8 MMOL/L (ref 5–14)
AST SERPL W P-5'-P-CCNC: 45 U/L (ref 17–59)
ATRIAL RATE: 72 BPM
ATRIAL RATE: 83 BPM
BILIRUB SERPL-MCNC: 2.09 MG/DL
BUN SERPL-MCNC: 11 MG/DL (ref 5–25)
CALCIUM SERPL-MCNC: 8.4 MG/DL (ref 8.4–10.2)
CHLORIDE SERPL-SCNC: 100 MMOL/L (ref 97–108)
CO2 SERPL-SCNC: 30 MMOL/L (ref 22–30)
CREAT SERPL-MCNC: 0.75 MG/DL (ref 0.7–1.5)
ERYTHROCYTE [DISTWIDTH] IN BLOOD BY AUTOMATED COUNT: 13 %
GFR SERPL CREATININE-BSD FRML MDRD: 124 ML/MIN/1.73SQ M
GLUCOSE SERPL-MCNC: 80 MG/DL (ref 70–99)
HCT VFR BLD AUTO: 38.7 % (ref 41–53)
HGB BLD-MCNC: 13.3 G/DL (ref 13.5–17.5)
MAGNESIUM SERPL-MCNC: 2.3 MG/DL (ref 1.6–2.3)
MCH RBC QN AUTO: 32.6 PG (ref 26–34)
MCHC RBC AUTO-ENTMCNC: 34.4 G/DL (ref 31–36)
MCV RBC AUTO: 95 FL (ref 80–100)
P AXIS: 33 DEGREES
P AXIS: 63 DEGREES
PLATELET # BLD AUTO: 164 THOUSANDS/UL (ref 150–450)
PMV BLD AUTO: 7.2 FL (ref 8.9–12.7)
POTASSIUM SERPL-SCNC: 3.2 MMOL/L (ref 3.6–5)
PR INTERVAL: 148 MS
PR INTERVAL: 180 MS
PROT SERPL-MCNC: 6.4 G/DL (ref 5.9–8.4)
QRS AXIS: 67 DEGREES
QRS AXIS: 81 DEGREES
QRSD INTERVAL: 86 MS
QRSD INTERVAL: 88 MS
QT INTERVAL: 402 MS
QT INTERVAL: 444 MS
QTC INTERVAL: 472 MS
QTC INTERVAL: 486 MS
RBC # BLD AUTO: 4.08 MILLION/UL (ref 4.5–5.9)
SODIUM SERPL-SCNC: 138 MMOL/L (ref 137–147)
T WAVE AXIS: 66 DEGREES
T WAVE AXIS: 69 DEGREES
VENTRICULAR RATE: 72 BPM
VENTRICULAR RATE: 83 BPM
WBC # BLD AUTO: 3 THOUSAND/UL (ref 4.5–11)

## 2021-10-17 PROCEDURE — 80053 COMPREHEN METABOLIC PANEL: CPT | Performed by: INTERNAL MEDICINE

## 2021-10-17 PROCEDURE — 99222 1ST HOSP IP/OBS MODERATE 55: CPT | Performed by: PSYCHIATRY & NEUROLOGY

## 2021-10-17 PROCEDURE — 93005 ELECTROCARDIOGRAM TRACING: CPT

## 2021-10-17 PROCEDURE — 85027 COMPLETE CBC AUTOMATED: CPT | Performed by: INTERNAL MEDICINE

## 2021-10-17 PROCEDURE — 93010 ELECTROCARDIOGRAM REPORT: CPT | Performed by: INTERNAL MEDICINE

## 2021-10-17 PROCEDURE — NC001 PR NO CHARGE: Performed by: INTERNAL MEDICINE

## 2021-10-17 PROCEDURE — 90471 IMMUNIZATION ADMIN: CPT | Performed by: EMERGENCY MEDICINE

## 2021-10-17 PROCEDURE — 99291 CRITICAL CARE FIRST HOUR: CPT | Performed by: INTERNAL MEDICINE

## 2021-10-17 PROCEDURE — 83735 ASSAY OF MAGNESIUM: CPT | Performed by: INTERNAL MEDICINE

## 2021-10-17 PROCEDURE — 99231 SBSQ HOSP IP/OBS SF/LOW 25: CPT | Performed by: PHYSICIAN ASSISTANT

## 2021-10-17 PROCEDURE — 90686 IIV4 VACC NO PRSV 0.5 ML IM: CPT | Performed by: EMERGENCY MEDICINE

## 2021-10-17 RX ORDER — HYDROXYZINE 50 MG/1
50 TABLET, FILM COATED ORAL ONCE
Status: COMPLETED | OUTPATIENT
Start: 2021-10-17 | End: 2021-10-17

## 2021-10-17 RX ORDER — POTASSIUM CHLORIDE 20 MEQ/1
40 TABLET, EXTENDED RELEASE ORAL ONCE
Status: COMPLETED | OUTPATIENT
Start: 2021-10-17 | End: 2021-10-17

## 2021-10-17 RX ORDER — ESCITALOPRAM OXALATE 10 MG/1
20 TABLET ORAL DAILY
Status: DISCONTINUED | OUTPATIENT
Start: 2021-10-18 | End: 2021-10-18 | Stop reason: HOSPADM

## 2021-10-17 RX ORDER — DIAZEPAM 5 MG/ML
10 INJECTION, SOLUTION INTRAMUSCULAR; INTRAVENOUS ONCE
Status: COMPLETED | OUTPATIENT
Start: 2021-10-17 | End: 2021-10-17

## 2021-10-17 RX ORDER — PHENOBARBITAL SODIUM 130 MG/ML
130 INJECTION INTRAMUSCULAR ONCE
Status: COMPLETED | OUTPATIENT
Start: 2021-10-17 | End: 2021-10-17

## 2021-10-17 RX ORDER — LOPERAMIDE HYDROCHLORIDE 2 MG/1
2 CAPSULE ORAL 3 TIMES DAILY PRN
Status: DISCONTINUED | OUTPATIENT
Start: 2021-10-17 | End: 2021-10-18 | Stop reason: HOSPADM

## 2021-10-17 RX ORDER — PHENOBARBITAL 64.8 MG/1
64.8 TABLET ORAL ONCE
Status: COMPLETED | OUTPATIENT
Start: 2021-10-17 | End: 2021-10-17

## 2021-10-17 RX ORDER — NICOTINE 21 MG/24HR
21 PATCH, TRANSDERMAL 24 HOURS TRANSDERMAL DAILY
Status: DISCONTINUED | OUTPATIENT
Start: 2021-10-17 | End: 2021-10-18 | Stop reason: HOSPADM

## 2021-10-17 RX ORDER — PHENOBARBITAL SODIUM 130 MG/ML
260 INJECTION INTRAMUSCULAR ONCE
Status: COMPLETED | OUTPATIENT
Start: 2021-10-17 | End: 2021-10-17

## 2021-10-17 RX ORDER — TOPIRAMATE 25 MG/1
25 TABLET ORAL
Status: DISCONTINUED | OUTPATIENT
Start: 2021-10-17 | End: 2021-10-18 | Stop reason: HOSPADM

## 2021-10-17 RX ADMIN — THIAMINE HCL TAB 100 MG 100 MG: 100 TAB at 08:43

## 2021-10-17 RX ADMIN — HYDROXYZINE HYDROCHLORIDE 50 MG: 50 TABLET, FILM COATED ORAL at 01:02

## 2021-10-17 RX ADMIN — PHENOBARBITAL SODIUM 260 MG: 130 INJECTION INTRAMUSCULAR at 11:07

## 2021-10-17 RX ADMIN — TOPIRAMATE 25 MG: 25 TABLET, FILM COATED ORAL at 21:12

## 2021-10-17 RX ADMIN — PHENOBARBITAL SODIUM 130 MG: 130 INJECTION INTRAMUSCULAR at 11:49

## 2021-10-17 RX ADMIN — SODIUM CHLORIDE 125 ML/HR: 0.9 INJECTION, SOLUTION INTRAVENOUS at 08:46

## 2021-10-17 RX ADMIN — NICOTINE 21 MG: 21 PATCH, EXTENDED RELEASE TRANSDERMAL at 08:44

## 2021-10-17 RX ADMIN — INFLUENZA VIRUS VACCINE 0.5 ML: 15; 15; 15; 15 SUSPENSION INTRAMUSCULAR at 08:44

## 2021-10-17 RX ADMIN — PHENOBARBITAL 64.8 MG: 64.8 TABLET ORAL at 23:27

## 2021-10-17 RX ADMIN — POTASSIUM CHLORIDE 40 MEQ: 1500 TABLET, EXTENDED RELEASE ORAL at 08:44

## 2021-10-17 RX ADMIN — TRAZODONE HYDROCHLORIDE 50 MG: 50 TABLET ORAL at 00:02

## 2021-10-17 RX ADMIN — MULTIPLE VITAMINS W/ MINERALS TAB 1 TABLET: TAB ORAL at 08:43

## 2021-10-17 RX ADMIN — ENOXAPARIN SODIUM 40 MG: 40 INJECTION SUBCUTANEOUS at 08:44

## 2021-10-17 RX ADMIN — ESCITALOPRAM OXALATE 15 MG: 5 TABLET, FILM COATED ORAL at 08:43

## 2021-10-17 RX ADMIN — FOLIC ACID 1 MG: 1 TABLET ORAL at 08:44

## 2021-10-17 RX ADMIN — PHENOBARBITAL SODIUM 130 MG: 130 INJECTION INTRAMUSCULAR at 16:13

## 2021-10-17 RX ADMIN — DIAZEPAM 10 MG: 5 INJECTION, SOLUTION INTRAMUSCULAR; INTRAVENOUS at 11:07

## 2021-10-17 RX ADMIN — LOPERAMIDE HYDROCHLORIDE 2 MG: 2 CAPSULE ORAL at 21:13

## 2021-10-18 VITALS
HEART RATE: 77 BPM | SYSTOLIC BLOOD PRESSURE: 133 MMHG | BODY MASS INDEX: 20.8 KG/M2 | RESPIRATION RATE: 18 BRPM | DIASTOLIC BLOOD PRESSURE: 90 MMHG | OXYGEN SATURATION: 99 % | TEMPERATURE: 97.3 F | HEIGHT: 71 IN | WEIGHT: 148.6 LBS

## 2021-10-18 LAB
ANION GAP SERPL CALCULATED.3IONS-SCNC: 4 MMOL/L (ref 5–14)
BASOPHILS # BLD AUTO: 0 THOUSANDS/ΜL (ref 0–0.1)
BASOPHILS NFR BLD AUTO: 1 % (ref 0–1)
BUN SERPL-MCNC: 4 MG/DL (ref 5–25)
CALCIUM SERPL-MCNC: 8.6 MG/DL (ref 8.4–10.2)
CHLORIDE SERPL-SCNC: 103 MMOL/L (ref 97–108)
CO2 SERPL-SCNC: 28 MMOL/L (ref 22–30)
CREAT SERPL-MCNC: 0.67 MG/DL (ref 0.7–1.5)
EOSINOPHIL # BLD AUTO: 0 THOUSAND/ΜL (ref 0–0.4)
EOSINOPHIL NFR BLD AUTO: 1 % (ref 0–6)
ERYTHROCYTE [DISTWIDTH] IN BLOOD BY AUTOMATED COUNT: 13.6 %
GFR SERPL CREATININE-BSD FRML MDRD: 130 ML/MIN/1.73SQ M
GLUCOSE SERPL-MCNC: 89 MG/DL (ref 70–99)
HCT VFR BLD AUTO: 36.7 % (ref 41–53)
HGB BLD-MCNC: 12.7 G/DL (ref 13.5–17.5)
LYMPHOCYTES # BLD AUTO: 1 THOUSANDS/ΜL (ref 0.5–4)
LYMPHOCYTES NFR BLD AUTO: 25 % (ref 25–45)
MCH RBC QN AUTO: 33.6 PG (ref 26–34)
MCHC RBC AUTO-ENTMCNC: 34.7 G/DL (ref 31–36)
MCV RBC AUTO: 97 FL (ref 80–100)
MONOCYTES # BLD AUTO: 0.4 THOUSAND/ΜL (ref 0.2–0.9)
MONOCYTES NFR BLD AUTO: 9 % (ref 1–10)
NEUTROPHILS # BLD AUTO: 2.7 THOUSANDS/ΜL (ref 1.8–7.8)
NEUTS SEG NFR BLD AUTO: 64 % (ref 45–65)
PLATELET # BLD AUTO: 152 THOUSANDS/UL (ref 150–450)
PMV BLD AUTO: 7 FL (ref 8.9–12.7)
POTASSIUM SERPL-SCNC: 3 MMOL/L (ref 3.6–5)
RBC # BLD AUTO: 3.79 MILLION/UL (ref 4.5–5.9)
SODIUM SERPL-SCNC: 135 MMOL/L (ref 137–147)
WBC # BLD AUTO: 4.1 THOUSAND/UL (ref 4.5–11)

## 2021-10-18 PROCEDURE — 85025 COMPLETE CBC W/AUTO DIFF WBC: CPT | Performed by: PHYSICIAN ASSISTANT

## 2021-10-18 PROCEDURE — 80048 BASIC METABOLIC PNL TOTAL CA: CPT | Performed by: PHYSICIAN ASSISTANT

## 2021-10-18 RX ORDER — QUETIAPINE FUMARATE 50 MG/1
25 TABLET, FILM COATED ORAL ONCE
Status: COMPLETED | OUTPATIENT
Start: 2021-10-18 | End: 2021-10-18

## 2021-10-18 RX ORDER — TOPIRAMATE 25 MG/1
25 TABLET ORAL
Qty: 30 TABLET | Refills: 0 | Status: SHIPPED | OUTPATIENT
Start: 2021-10-18

## 2021-10-18 RX ORDER — NALTREXONE HYDROCHLORIDE 50 MG/1
50 TABLET, FILM COATED ORAL DAILY
Status: DISCONTINUED | OUTPATIENT
Start: 2021-10-18 | End: 2021-10-18 | Stop reason: HOSPADM

## 2021-10-18 RX ORDER — ESCITALOPRAM OXALATE 20 MG/1
20 TABLET ORAL DAILY
Qty: 30 TABLET | Refills: 0 | Status: SHIPPED | OUTPATIENT
Start: 2021-10-19

## 2021-10-18 RX ORDER — NALTREXONE HYDROCHLORIDE 50 MG/1
50 TABLET, FILM COATED ORAL DAILY
Qty: 30 TABLET | Refills: 0 | Status: SHIPPED | OUTPATIENT
Start: 2021-10-19

## 2021-10-18 RX ORDER — POTASSIUM CHLORIDE 20 MEQ/1
40 TABLET, EXTENDED RELEASE ORAL ONCE
Status: COMPLETED | OUTPATIENT
Start: 2021-10-18 | End: 2021-10-18

## 2021-10-18 RX ADMIN — ESCITALOPRAM OXALATE 20 MG: 10 TABLET ORAL at 09:23

## 2021-10-18 RX ADMIN — QUETIAPINE FUMARATE 25 MG: 50 TABLET ORAL at 02:24

## 2021-10-18 RX ADMIN — NICOTINE 21 MG: 21 PATCH, EXTENDED RELEASE TRANSDERMAL at 09:23

## 2021-10-18 RX ADMIN — POTASSIUM CHLORIDE 40 MEQ: 1500 TABLET, EXTENDED RELEASE ORAL at 11:30

## 2021-10-18 RX ADMIN — FOLIC ACID 1 MG: 1 TABLET ORAL at 09:23

## 2021-10-18 RX ADMIN — NALTREXONE HYDROCHLORIDE 50 MG: 50 TABLET, FILM COATED ORAL at 09:41

## 2021-10-18 RX ADMIN — LOPERAMIDE HYDROCHLORIDE 2 MG: 2 CAPSULE ORAL at 09:41

## 2021-10-18 RX ADMIN — THIAMINE HCL TAB 100 MG 100 MG: 100 TAB at 09:23

## 2021-10-18 RX ADMIN — MULTIPLE VITAMINS W/ MINERALS TAB 1 TABLET: TAB ORAL at 09:23

## 2021-10-18 RX ADMIN — POTASSIUM CHLORIDE 40 MEQ: 1500 TABLET, EXTENDED RELEASE ORAL at 09:23

## 2022-01-24 ENCOUNTER — OFFICE VISIT (OUTPATIENT)
Dept: PRIMARY CARE | Facility: CLINIC | Age: 32
End: 2022-01-24
Payer: COMMERCIAL

## 2022-01-24 VITALS
DIASTOLIC BLOOD PRESSURE: 68 MMHG | WEIGHT: 155 LBS | RESPIRATION RATE: 14 BRPM | HEIGHT: 71 IN | HEART RATE: 106 BPM | SYSTOLIC BLOOD PRESSURE: 116 MMHG | TEMPERATURE: 98.1 F | BODY MASS INDEX: 21.7 KG/M2 | OXYGEN SATURATION: 96 %

## 2022-01-24 DIAGNOSIS — F32.A ANXIETY AND DEPRESSION: ICD-10-CM

## 2022-01-24 DIAGNOSIS — F41.9 ANXIETY AND DEPRESSION: ICD-10-CM

## 2022-01-24 DIAGNOSIS — R10.11 RUQ ABDOMINAL PAIN: ICD-10-CM

## 2022-01-24 DIAGNOSIS — R56.9 ALCOHOL WITHDRAWAL SEIZURE WITHOUT COMPLICATION (CMS/HCC): ICD-10-CM

## 2022-01-24 DIAGNOSIS — Z87.19 HISTORY OF ACUTE PANCREATITIS: ICD-10-CM

## 2022-01-24 DIAGNOSIS — F10.930 ALCOHOL WITHDRAWAL SEIZURE WITHOUT COMPLICATION (CMS/HCC): ICD-10-CM

## 2022-01-24 DIAGNOSIS — R10.12 LEFT UPPER QUADRANT ABDOMINAL PAIN: ICD-10-CM

## 2022-01-24 DIAGNOSIS — F10.220: Primary | ICD-10-CM

## 2022-01-24 PROCEDURE — 99215 OFFICE O/P EST HI 40 MIN: CPT | Performed by: FAMILY MEDICINE

## 2022-01-24 PROCEDURE — 3008F BODY MASS INDEX DOCD: CPT | Performed by: FAMILY MEDICINE

## 2022-01-24 RX ORDER — LANOLIN ALCOHOL/MO/W.PET/CERES
100 CREAM (GRAM) TOPICAL DAILY
COMMUNITY

## 2022-01-24 RX ORDER — ESCITALOPRAM OXALATE 20 MG/1
20 TABLET ORAL DAILY
Qty: 90 TABLET | Refills: 1 | Status: SHIPPED | OUTPATIENT
Start: 2022-01-24 | End: 2022-10-20 | Stop reason: SDUPTHER

## 2022-01-24 ASSESSMENT — LIFESTYLE VARIABLES
HOW MANY STANDARD DRINKS CONTAINING ALCOHOL DO YOU HAVE ON A TYPICAL DAY: 10 OR MORE
HOW OFTEN DO YOU HAVE A DRINK CONTAINING ALCOHOL: 4 OR MORE TIMES A WEEK
HOW OFTEN DO YOU HAVE SIX OR MORE DRINKS ON ONE OCCASION: DAILY OR ALMOST DAILY

## 2022-01-24 ASSESSMENT — ENCOUNTER SYMPTOMS
RESPIRATORY NEGATIVE: 1
VOMITING: 1
ALLERGIC/IMMUNOLOGIC NEGATIVE: 1
CONSTITUTIONAL NEGATIVE: 1
CARDIOVASCULAR NEGATIVE: 1
ABDOMINAL DISTENTION: 0
NEUROLOGICAL NEGATIVE: 1
PSYCHIATRIC NEGATIVE: 1
ABDOMINAL PAIN: 1
HEMATOLOGIC/LYMPHATIC NEGATIVE: 1
MUSCULOSKELETAL NEGATIVE: 1
EYES NEGATIVE: 1
ENDOCRINE NEGATIVE: 1

## 2022-01-24 ASSESSMENT — PAIN SCALES - GENERAL: PAINLEVEL: 3

## 2022-01-24 NOTE — PROGRESS NOTES
"Subjective      Patient ID: Jeff Hobbs is a 31 y.o. male     HPI:  Here for an annual check up.  He has resumed drinking about 2 weeks ago, the past week he has consumed around 1.5 liters a day.  He has been in rehab twice, most recently April 2021, both times he resumed drinking soon after completion.  He has been going to meetings through  daily, but not for the past 2 weeks.  His last drink was last evening.  He doesn't think he is ready to quit today, but would like to speak to a counselor.  He is now living in [facility] in [facility].  He feels like lexapro helps with anxiety, he is on 20 mg daily.  Has been having left upper quadrant pain, and this comes and goes for the past 2 week.  Says it feels a bit like pain experienced with pancreatitis    The following have been reviewed and updated as appropriate in this visit:   Problems       Review of Systems   Constitutional: Negative.    HENT: Negative.    Eyes: Negative.    Respiratory: Negative.    Cardiovascular: Negative.    Gastrointestinal: Positive for abdominal pain and vomiting. Negative for abdominal distention.   Endocrine: Negative.    Genitourinary: Negative.    Musculoskeletal: Negative.    Skin: Negative.    Allergic/Immunologic: Negative.    Neurological: Negative.    Hematological: Negative.    Psychiatric/Behavioral: Negative.    All other systems reviewed and are negative.    Vitals:    01/24/22 0741   BP: 116/68   BP Location: Right upper arm   Patient Position: Sitting   Pulse: (!) 106   Resp: 14   Temp: 36.7 °C (98.1 °F)   TempSrc: Oral   SpO2: 96%   Weight: 70.3 kg (155 lb)   Height: 1.803 m (5' 11\")      Current Outpatient Medications   Medication Sig Dispense Refill   • escitalopram 20 mg tablet Take 1 tablet (20 mg total) by mouth daily. 90 tablet 1   • multivitamin with minerals (DAILY MULTIVITAMIN-MINERALS) tablet Take 1 tablet by mouth daily.     • thiamine 100 mg tablet Take 100 mg by mouth daily.     • folic acid " (FOLVITE) 1 mg tablet Take 1 mg by mouth daily.       No current facility-administered medications for this visit.      Social History     Tobacco Use   • Smoking status: Current Every Day Smoker     Types: Electronic Cigarette   • Smokeless tobacco: Never Used   Substance Use Topics   • Alcohol use: Yes     Comment: His girlfriend reports that if he does not have some alcoholic beverages especially in the evening he cannot sleep, 4 handles of vodka in past week   • Drug use: Never      Family History   Problem Relation Age of Onset   • Diabetes Biological Mother    • Heart disease Biological Mother    • No Known Problems Biological Father    • No Known Problems Biological Brother    • Heart disease Maternal Grandmother         No past medical history on file.     Objective     Physical Exam  Vitals reviewed.   Constitutional:       Appearance: Normal appearance. He is ill-appearing.   Cardiovascular:      Rate and Rhythm: Regular rhythm. Tachycardia present.      Heart sounds: Normal heart sounds.   Pulmonary:      Effort: Pulmonary effort is normal.      Breath sounds: Normal breath sounds.   Abdominal:      General: There is no distension.      Tenderness: There is abdominal tenderness (right and left upper quad tenderness).   Skin:     General: Skin is warm.   Neurological:      Mental Status: He is alert.      Comments: Tremor out stretched hands         Problem List Items Addressed This Visit        Nervous    Alcohol dependence with acute alcoholic intoxication without complication (CMS/HCC) - Primary    Relevant Medications    escitalopram 20 mg tablet    Other Relevant Orders    ULTRASOUND ABDOMEN LIMITED    Patient refuses rehabilitation services    Alcohol withdrawal seizure (CMS/HCC)       Other    Anxiety and depression    Relevant Medications    escitalopram 20 mg tablet    History of acute pancreatitis    Relevant Orders    Lipase    ULTRASOUND ABDOMEN LIMITED      Other Visit Diagnoses     Left upper  quadrant abdominal pain        Relevant Orders    CBC    Basic metabolic panel    Hepatic function panel    ULTRASOUND ABDOMEN LIMITED    RUQ abdominal pain        Relevant Orders    Hepatic function panel    ULTRASOUND ABDOMEN LIMITED          Assessment/Plan     He is advised to present to the ED now for further evaluation and management.  He is at high risk for alcohol WD and seizures induced from this.  Has hx of seizures induced by alcohol and acute pancreatitis.  He is having pain now in the abdomen as well, concern is for developing WD, and acute pancreatitis, as well as seizure.

## 2022-01-25 LAB
ALBUMIN SERPL-MCNC: 5.2 G/DL (ref 4.1–5.2)
ALP SERPL-CCNC: 56 IU/L (ref 44–121)
ALT SERPL-CCNC: 56 IU/L (ref 0–44)
AST SERPL-CCNC: 107 IU/L (ref 0–40)
BILIRUB DIRECT SERPL-MCNC: 0.31 MG/DL (ref 0–0.4)
BILIRUB SERPL-MCNC: 1 MG/DL (ref 0–1.2)
BUN SERPL-MCNC: 6 MG/DL (ref 6–20)
BUN/CREAT SERPL: 7 (ref 9–20)
CALCIUM SERPL-MCNC: 9.4 MG/DL (ref 8.7–10.2)
CHLORIDE SERPL-SCNC: 101 MMOL/L (ref 96–106)
CO2 SERPL-SCNC: 29 MMOL/L (ref 20–29)
CREAT SERPL-MCNC: 0.92 MG/DL (ref 0.76–1.27)
ERYTHROCYTE [DISTWIDTH] IN BLOOD BY AUTOMATED COUNT: 13.1 % (ref 11.6–15.4)
GLUCOSE SERPL-MCNC: 99 MG/DL (ref 65–99)
HCT VFR BLD AUTO: 42.8 % (ref 37.5–51)
HGB BLD-MCNC: 14.5 G/DL (ref 13–17.7)
LAB CORP EGFR IF AFRICN AM: 129 ML/MIN/1.73
LAB CORP EGFR IF NONAFRICN AM: 112 ML/MIN/1.73
LIPASE SERPL-CCNC: 40 U/L (ref 13–78)
MCH RBC QN AUTO: 33 PG (ref 26.6–33)
MCHC RBC AUTO-ENTMCNC: 33.9 G/DL (ref 31.5–35.7)
MCV RBC AUTO: 97 FL (ref 79–97)
PLATELET # BLD AUTO: 147 X10E3/UL (ref 150–450)
POTASSIUM SERPL-SCNC: 4.2 MMOL/L (ref 3.5–5.2)
PROT SERPL-MCNC: 7.5 G/DL (ref 6–8.5)
RBC # BLD AUTO: 4.4 X10E6/UL (ref 4.14–5.8)
SODIUM SERPL-SCNC: 143 MMOL/L (ref 134–144)
WBC # BLD AUTO: 3 X10E3/UL (ref 3.4–10.8)

## 2022-02-02 ENCOUNTER — TELEPHONE (OUTPATIENT)
Dept: PRIMARY CARE | Facility: CLINIC | Age: 32
End: 2022-02-02
Payer: COMMERCIAL

## 2022-02-02 PROBLEM — K76.9 LESION OF RIGHT LOBE OF LIVER: Status: ACTIVE | Noted: 2022-02-02

## 2022-02-02 NOTE — TELEPHONE ENCOUNTER
Pt was in to see you on 0/124/22/ and on 01/29/22 he went to [facility]  And I  See the records in the chart. They did a PET scan and b/w    He wants to know if he should still get the Ultrasound  abd and the b/w since he had b/w done in ER.  Please advise

## 2022-02-03 NOTE — TELEPHONE ENCOUNTER
Called pt to tell him he could hold off on studies for now per Dr Murphy  and he is back in  [facility]. He went yesterday  and they did  CT scan and EEG and EKG.

## 2022-02-22 ENCOUNTER — TELEPHONE (OUTPATIENT)
Dept: PRIMARY CARE | Facility: CLINIC | Age: 32
End: 2022-02-22
Payer: COMMERCIAL

## 2022-02-22 NOTE — TELEPHONE ENCOUNTER
Please advise if okay to fill out [employer] forms for seizure and substance use  Pt was at [facility] on 2/2/2022

## 2022-02-22 NOTE — TELEPHONE ENCOUNTER
I made an appt for hospital follow ([facility]); s/p seizure on 2/28/22 with Dr Murphy.    Pt needs 2 forms completed for Stewartkatie Olson.  One is substance use form and one is a seizure reporting form.  He is wondering if Dr Murphy will be able to complete these forms?      If pt will need clearance from another provider, please let him know asap so he can arrange that appt.  (Some records are available in Media and in Care Everywhere)

## 2022-02-24 NOTE — TELEPHONE ENCOUNTER
I spoke with Dr Murphy regarding requested forms: GILMA WALKER seizure and substance use. Dr Murphy is unable to complete forms as pt should be seizure free for 6 months prior to driving. Pt was encouraged Alcohol abuse cessation in past but has declined.

## 2022-02-25 NOTE — TELEPHONE ENCOUNTER
Seizures were reported to the [facility] by the emergency department at [facility] where he was seen.  He cannot drive for another 6 months.

## 2022-03-25 ENCOUNTER — TELEPHONE (OUTPATIENT)
Dept: PRIMARY CARE | Facility: CLINIC | Age: 32
End: 2022-03-25
Payer: COMMERCIAL

## 2022-03-25 RX ORDER — FOLIC ACID 1 MG/1
1 TABLET ORAL DAILY
Qty: 90 TABLET | Refills: 1 | Status: SHIPPED | OUTPATIENT
Start: 2022-03-25 | End: 2023-01-03

## 2022-03-25 NOTE — TELEPHONE ENCOUNTER
Medicine Refill Request    Last Office: 1/24/2022   Last Consult Visit: Visit date not found  Last Telemedicine Visit: 3/2/2021 Javier Murphy MD    Next Appointment: Visit date not found    folic acid (FOLVITE) 1 mg tablet    Pt has several tablets remaining.       Current Outpatient Medications:   •  escitalopram 20 mg tablet, Take 1 tablet (20 mg total) by mouth daily., Disp: 90 tablet, Rfl: 1  •  folic acid (FOLVITE) 1 mg tablet, Take 1 mg by mouth daily., Disp: , Rfl:   •  multivitamin with minerals (DAILY MULTIVITAMIN-MINERALS) tablet, Take 1 tablet by mouth daily., Disp: , Rfl:   •  thiamine 100 mg tablet, Take 100 mg by mouth daily., Disp: , Rfl:       BP Readings from Last 3 Encounters:   01/24/22 116/68   09/27/21 118/74   12/28/20 120/78       Recent Lab results:  Lab Results   Component Value Date    CHOL 170 09/28/2021   ,   Lab Results   Component Value Date    HDL 46 09/28/2021   ,   Lab Results   Component Value Date    LDLCALC 112 (H) 09/28/2021   ,   Lab Results   Component Value Date    TRIG 59 09/28/2021        Lab Results   Component Value Date    GLUCOSE 99 01/24/2022   , No results found for: HGBA1C      Lab Results   Component Value Date    CREATININE 0.92 01/24/2022       Lab Results   Component Value Date    TSH 0.801 11/04/2020

## 2022-03-25 NOTE — TELEPHONE ENCOUNTER
Medicine Refill Request    Last Office: 1/24/2022   Last Consult Visit: Visit date not found  Last Telemedicine Visit: 3/2/2021 Javier Murphy MD    Next Appointment: Visit date not found  Pended refill     Current Outpatient Medications:   •  escitalopram 20 mg tablet, Take 1 tablet (20 mg total) by mouth daily., Disp: 90 tablet, Rfl: 1  •  folic acid (FOLVITE) 1 mg tablet, Take 1 mg by mouth daily., Disp: , Rfl:   •  multivitamin with minerals (DAILY MULTIVITAMIN-MINERALS) tablet, Take 1 tablet by mouth daily., Disp: , Rfl:   •  thiamine 100 mg tablet, Take 100 mg by mouth daily., Disp: , Rfl:       BP Readings from Last 3 Encounters:   01/24/22 116/68   09/27/21 118/74   12/28/20 120/78       Recent Lab results:  Lab Results   Component Value Date    CHOL 170 09/28/2021   ,   Lab Results   Component Value Date    HDL 46 09/28/2021   ,   Lab Results   Component Value Date    LDLCALC 112 (H) 09/28/2021   ,   Lab Results   Component Value Date    TRIG 59 09/28/2021        Lab Results   Component Value Date    GLUCOSE 99 01/24/2022   , No results found for: HGBA1C      Lab Results   Component Value Date    CREATININE 0.92 01/24/2022       Lab Results   Component Value Date    TSH 0.801 11/04/2020

## 2022-04-22 LAB — HCV AB SER-ACNC: NONREACTIVE

## 2022-04-28 ENCOUNTER — LAB REQUISITION (OUTPATIENT)
Dept: LAB | Facility: HOSPITAL | Age: 32
End: 2022-04-28
Attending: NURSE PRACTITIONER
Payer: COMMERCIAL

## 2022-04-28 DIAGNOSIS — F10.20 ALCOHOL DEPENDENCE, UNCOMPLICATED (CMS/HCC): ICD-10-CM

## 2022-04-28 LAB
ALBUMIN SERPL-MCNC: 3.7 G/DL (ref 3.4–5)
ALP SERPL-CCNC: 41 IU/L (ref 35–126)
ALT SERPL-CCNC: 35 IU/L (ref 16–63)
AST SERPL-CCNC: 31 IU/L (ref 15–41)
BILIRUB DIRECT SERPL-MCNC: <0.1 MG/DL
BILIRUB SERPL-MCNC: 0.4 MG/DL (ref 0.3–1.2)
ERYTHROCYTE [DISTWIDTH] IN BLOOD BY AUTOMATED COUNT: 13.3 % (ref 11.6–14.4)
HCT VFR BLDCO AUTO: 39.8 % (ref 40.1–51)
HGB BLD-MCNC: 13.3 G/DL (ref 13.7–17.5)
MCH RBC QN AUTO: 32.5 PG (ref 28–33.2)
MCHC RBC AUTO-ENTMCNC: 33.4 G/DL (ref 32.2–36.5)
MCV RBC AUTO: 97.3 FL (ref 83–98)
PDW BLD AUTO: 9.9 FL (ref 9.4–12.4)
PLATELET # BLD AUTO: 208 K/UL (ref 150–350)
PROT SERPL-MCNC: 6 G/DL (ref 6–8.2)
RBC # BLD AUTO: 4.09 M/UL (ref 4.5–5.8)
WBC # BLD AUTO: 5.12 K/UL (ref 3.8–10.5)

## 2022-04-28 PROCEDURE — 80076 HEPATIC FUNCTION PANEL: CPT | Performed by: NURSE PRACTITIONER

## 2022-04-28 PROCEDURE — 85027 COMPLETE CBC AUTOMATED: CPT | Performed by: NURSE PRACTITIONER

## 2022-06-14 RX ORDER — PANTOPRAZOLE SODIUM 40 MG/1
40 TABLET, DELAYED RELEASE ORAL DAILY
Qty: 30 TABLET | Refills: 0 | Status: SHIPPED | OUTPATIENT
Start: 2022-06-14 | End: 2022-06-20 | Stop reason: SDUPTHER

## 2022-06-14 RX ORDER — BUSPIRONE HYDROCHLORIDE 10 MG/1
10 TABLET ORAL 3 TIMES DAILY
Qty: 90 TABLET | Refills: 0 | Status: SHIPPED | OUTPATIENT
Start: 2022-06-14 | End: 2022-06-20

## 2022-06-14 NOTE — TELEPHONE ENCOUNTER
Call from pt. Was in [facility]". Was prescribed Protonix & Buspar while there. Now needs refills. Scheduled appt with Dr BOWIE for 6/20/22. Can meds be called in?     Protonix 40 mg BID  Buspar 10 mg BID     [facility]  [phone]

## 2022-06-20 ENCOUNTER — OFFICE VISIT (OUTPATIENT)
Dept: PRIMARY CARE | Facility: CLINIC | Age: 32
End: 2022-06-20
Payer: COMMERCIAL

## 2022-06-20 VITALS
DIASTOLIC BLOOD PRESSURE: 75 MMHG | SYSTOLIC BLOOD PRESSURE: 118 MMHG | RESPIRATION RATE: 16 BRPM | HEART RATE: 66 BPM | OXYGEN SATURATION: 99 % | WEIGHT: 169 LBS | HEIGHT: 71 IN | BODY MASS INDEX: 23.66 KG/M2 | TEMPERATURE: 97.6 F

## 2022-06-20 DIAGNOSIS — Z09 HOSPITAL DISCHARGE FOLLOW-UP: Primary | ICD-10-CM

## 2022-06-20 DIAGNOSIS — K20.90 ESOPHAGITIS, ACUTE: ICD-10-CM

## 2022-06-20 DIAGNOSIS — F10.220: ICD-10-CM

## 2022-06-20 DIAGNOSIS — K29.01 ACUTE GASTRITIS WITH HEMORRHAGE, UNSPECIFIED GASTRITIS TYPE: ICD-10-CM

## 2022-06-20 DIAGNOSIS — F32.A ANXIETY AND DEPRESSION: ICD-10-CM

## 2022-06-20 DIAGNOSIS — F41.9 ANXIETY AND DEPRESSION: ICD-10-CM

## 2022-06-20 PROCEDURE — 99214 OFFICE O/P EST MOD 30 MIN: CPT | Performed by: FAMILY MEDICINE

## 2022-06-20 PROCEDURE — 3008F BODY MASS INDEX DOCD: CPT | Performed by: FAMILY MEDICINE

## 2022-06-20 RX ORDER — TRAZODONE HYDROCHLORIDE 50 MG/1
25 TABLET ORAL NIGHTLY PRN
COMMUNITY
Start: 2022-05-31 | End: 2022-06-20 | Stop reason: SDUPTHER

## 2022-06-20 RX ORDER — BUSPIRONE HYDROCHLORIDE 10 MG/1
10 TABLET ORAL 2 TIMES DAILY
Qty: 90 TABLET | Refills: 1 | Status: SHIPPED | OUTPATIENT
Start: 2022-06-20 | End: 2022-10-20 | Stop reason: SDUPTHER

## 2022-06-20 RX ORDER — TRAZODONE HYDROCHLORIDE 50 MG/1
25 TABLET ORAL NIGHTLY PRN
Qty: 45 TABLET | Refills: 1 | Status: SHIPPED | OUTPATIENT
Start: 2022-06-20 | End: 2022-10-20 | Stop reason: SDUPTHER

## 2022-06-20 RX ORDER — BUSPIRONE HYDROCHLORIDE 10 MG/1
10 TABLET ORAL 2 TIMES DAILY
Qty: 90 TABLET | Refills: 0 | COMMUNITY
Start: 2022-06-20 | End: 2022-06-20 | Stop reason: SDUPTHER

## 2022-06-20 RX ORDER — PANTOPRAZOLE SODIUM 40 MG/1
40 TABLET, DELAYED RELEASE ORAL DAILY
Qty: 90 TABLET | Refills: 1 | Status: SHIPPED | OUTPATIENT
Start: 2022-06-20 | End: 2023-09-22

## 2022-06-20 RX ORDER — CALCIUM CARBONATE 200(500)MG
500 TABLET,CHEWABLE ORAL
COMMUNITY

## 2022-06-20 ASSESSMENT — ENCOUNTER SYMPTOMS
PSYCHIATRIC NEGATIVE: 1
GASTROINTESTINAL NEGATIVE: 1
RESPIRATORY NEGATIVE: 1
ENDOCRINE NEGATIVE: 1
ALLERGIC/IMMUNOLOGIC NEGATIVE: 1
CARDIOVASCULAR NEGATIVE: 1
HEMATOLOGIC/LYMPHATIC NEGATIVE: 1
ABDOMINAL PAIN: 0
NAUSEA: 0
MUSCULOSKELETAL NEGATIVE: 1
EYES NEGATIVE: 1
CONSTITUTIONAL NEGATIVE: 1
NEUROLOGICAL NEGATIVE: 1
DIARRHEA: 0

## 2022-06-20 NOTE — PROGRESS NOTES
Subjective      Patient ID: Jeff Hobbs is a 31 y.o. male     HPI:  Here for follow up.  He was admitted to the [facility] on April 21 until April 24, 2022.  After which he was discharged to [facility] for about 40 days.  He initially presented with acute alcohol intoxication and at that time of his admission his level was 323 he required several doses of Ativan for symptom relief and did have persistent nausea and a few episodes of coffee-ground emesis in the emergency department.  He was seen by gastroenterology and an esophagogastroduodenoscopy was performed on April 22, 2022 which revealed the presence of grade D esophagitis, evidence of gastritis.  He was instructed to increase his dose of proton pump inhibitors to 40 mg twice daily for the next 6 days and then to go down to 40 mg once daily thereafter.  He had a set back regarding alcohol use, but reports that over the past week, has been abstaining from alcohol.  Neurology recommended follow-up EGD approximately 3 months after discharge.  He also was recommended to take Protonix 40 mg twice a day for the first 6 weeks after discharge and then take 40 mg of Protonix once daily thereafter.    Discharge medications reviewed and reconciled with home list    The following have been reviewed and updated as appropriate in this visit:   Allergies  Meds  Problems         Review of Systems   Constitutional: Negative.    HENT: Negative.    Eyes: Negative.    Respiratory: Negative.    Cardiovascular: Negative.    Gastrointestinal: Negative.  Negative for abdominal pain, diarrhea and nausea.   Endocrine: Negative.    Genitourinary: Negative.    Musculoskeletal: Negative.    Skin: Negative.    Allergic/Immunologic: Negative.    Neurological: Negative.    Hematological: Negative.    Psychiatric/Behavioral: Negative.    All other systems reviewed and are negative.    Vitals:    06/20/22 1318   BP: 118/75   BP Location: Left upper arm  "  Patient Position: Sitting   Pulse: 66   Resp: 16   Temp: 36.4 °C (97.6 °F)   TempSrc: Temporal   SpO2: 99%   Weight: 76.7 kg (169 lb)   Height: 1.803 m (5' 11\")      Current Outpatient Medications   Medication Sig Dispense Refill   • busPIRone (BUSPAR) 10 mg tablet Take 1 tablet (10 mg total) by mouth 2 (two) times a day. 90 tablet 1   • escitalopram 20 mg tablet Take 1 tablet (20 mg total) by mouth daily. 90 tablet 1   • folic acid (FOLVITE) 1 mg tablet Take 1 tablet (1 mg total) by mouth daily. 90 tablet 1   • multivitamin with minerals tablet Take 1 tablet by mouth daily.     • pantoprazole (PROTONIX) 40 mg EC tablet Take 1 tablet (40 mg total) by mouth daily. 90 tablet 1   • thiamine 100 mg tablet Take 100 mg by mouth daily.     • traZODone (DESYREL) 50 mg tablet Take 0.5 tablets (25 mg total) by mouth nightly as needed (insomnia). 45 tablet 1   • calcium carbonate (TUMS) 200 mg calcium (500 mg) chewable tablet Take 500 mg by mouth.       No current facility-administered medications for this visit.      Social History     Tobacco Use   • Smoking status: Current Every Day Smoker     Types: Electronic Cigarette   • Smokeless tobacco: Never Used   Substance Use Topics   • Alcohol use: Yes     Comment: His girlfriend reports that if he does not have some alcoholic beverages especially in the evening he cannot sleep, 4 handles of vodka in past week   • Drug use: Never      Family History   Problem Relation Age of Onset   • Diabetes Biological Mother    • Heart disease Biological Mother    • No Known Problems Biological Father    • No Known Problems Biological Brother    • Heart disease Maternal Grandmother         No past medical history on file.     Objective     Physical Exam  Vitals reviewed.   Constitutional:       Appearance: Normal appearance. He is normal weight.   Neurological:      Mental Status: He is alert.         Problem List Items Addressed This Visit        Nervous    Alcohol dependence with acute " alcoholic intoxication without complication (CMS/HCC)       Other    Anxiety and depression    Relevant Medications    traZODone (DESYREL) 50 mg tablet    busPIRone (BUSPAR) 10 mg tablet      Other Visit Diagnoses     Hospital discharge follow-up    -  Primary    Acute gastritis with hemorrhage, unspecified gastritis type        Relevant Medications    calcium carbonate (TUMS) 200 mg calcium (500 mg) chewable tablet    pantoprazole (PROTONIX) 40 mg EC tablet    Other Relevant Orders    Comprehensive metabolic panel    CBC    Esophagitis, acute        Relevant Medications    calcium carbonate (TUMS) 200 mg calcium (500 mg) chewable tablet    pantoprazole (PROTONIX) 40 mg EC tablet          Assessment/Plan     He was recently admitted to the hospital as noted, please see HPI for full details.  His hospitalization was followed by approximately 4 to 5 weeks of rehab, which he reports he has done well but did have a setback after his release from the rehab however over the past week he reports that he has not been drinking and has not had any withdrawal symptoms.  I reviewed the hospitalization documentation he did have abnormal electrolytes and other lab parameters such as abnormal liver functions for which I will repeat a comprehensive metabolic panel as well as a CBC.  He has had esophagitis and gastritis documented, I would like for him to resume taking Protonix 40 mg once daily until he is seen by gastroenterology for repeat EGD.  GI would like to see him back in the office in about 3 months for follow-up repeat EGD.  Regarding anxiety and depression.  He will continue with BuSpar and trazodone for now and we will see each other in 3 to 4 months for close follow-up.

## 2022-10-20 ENCOUNTER — OFFICE VISIT (OUTPATIENT)
Dept: PRIMARY CARE | Facility: CLINIC | Age: 32
End: 2022-10-20
Payer: COMMERCIAL

## 2022-10-20 VITALS
OXYGEN SATURATION: 98 % | BODY MASS INDEX: 23.66 KG/M2 | RESPIRATION RATE: 18 BRPM | HEIGHT: 71 IN | TEMPERATURE: 98 F | WEIGHT: 169 LBS | SYSTOLIC BLOOD PRESSURE: 119 MMHG | HEART RATE: 86 BPM | DIASTOLIC BLOOD PRESSURE: 70 MMHG

## 2022-10-20 DIAGNOSIS — G47.09 OTHER INSOMNIA: ICD-10-CM

## 2022-10-20 DIAGNOSIS — F10.220: ICD-10-CM

## 2022-10-20 DIAGNOSIS — Z23 NEED FOR INFLUENZA VACCINATION: Primary | ICD-10-CM

## 2022-10-20 DIAGNOSIS — F32.A ANXIETY AND DEPRESSION: ICD-10-CM

## 2022-10-20 DIAGNOSIS — F41.9 ANXIETY AND DEPRESSION: ICD-10-CM

## 2022-10-20 PROCEDURE — 99214 OFFICE O/P EST MOD 30 MIN: CPT | Mod: 25 | Performed by: FAMILY MEDICINE

## 2022-10-20 PROCEDURE — 90686 IIV4 VACC NO PRSV 0.5 ML IM: CPT | Performed by: FAMILY MEDICINE

## 2022-10-20 PROCEDURE — 90471 IMMUNIZATION ADMIN: CPT | Performed by: FAMILY MEDICINE

## 2022-10-20 PROCEDURE — 3008F BODY MASS INDEX DOCD: CPT | Performed by: FAMILY MEDICINE

## 2022-10-20 RX ORDER — BUSPIRONE HYDROCHLORIDE 10 MG/1
10 TABLET ORAL 2 TIMES DAILY
Qty: 90 TABLET | Refills: 1 | Status: SHIPPED | OUTPATIENT
Start: 2022-10-20 | End: 2023-05-09 | Stop reason: SDUPTHER

## 2022-10-20 RX ORDER — TRAZODONE HYDROCHLORIDE 50 MG/1
50 TABLET ORAL NIGHTLY PRN
Qty: 90 TABLET | Refills: 1 | Status: SHIPPED | OUTPATIENT
Start: 2022-10-20 | End: 2023-05-09 | Stop reason: SDUPTHER

## 2022-10-20 RX ORDER — ESCITALOPRAM OXALATE 20 MG/1
20 TABLET ORAL DAILY
Qty: 90 TABLET | Refills: 1 | Status: SHIPPED | OUTPATIENT
Start: 2022-10-20 | End: 2023-05-09 | Stop reason: SDUPTHER

## 2022-10-20 ASSESSMENT — ENCOUNTER SYMPTOMS
GASTROINTESTINAL NEGATIVE: 1
CONSTITUTIONAL NEGATIVE: 1
PSYCHIATRIC NEGATIVE: 1
EYES NEGATIVE: 1
RESPIRATORY NEGATIVE: 1
MUSCULOSKELETAL NEGATIVE: 1
ALLERGIC/IMMUNOLOGIC NEGATIVE: 1
NEUROLOGICAL NEGATIVE: 1
HEMATOLOGIC/LYMPHATIC NEGATIVE: 1
ENDOCRINE NEGATIVE: 1
CARDIOVASCULAR NEGATIVE: 1

## 2022-10-20 NOTE — PROGRESS NOTES
"Subjective      Patient ID: Jeff Hobbs is a 32 y.o. male     HPI:  Follow up for anxiety and depression.  He has been drinking lately, and was in the hospital ED at [facility].  Was sober in early 2022, when staying with parents.  Did rehab in 4/2022, and still going to AA meetings.  Started drinking again this summer, usually binge drinking for 3-4 days then off for a few days.  Since getting out of [facility], went on a 3 day binge, nothing to drink since Monday.  Head feels fuzzy, but no physical complaints.  He declines rehab options for now.  He drinks about 1/3 gallon of vodka daily    The following have been reviewed and updated as appropriate in this visit:   Tobacco  Allergies  Meds  Problems  Med Hx  Surg Hx  Fam Hx       Review of Systems   Constitutional: Negative.    HENT: Negative.    Eyes: Negative.    Respiratory: Negative.    Cardiovascular: Negative.    Gastrointestinal: Negative.    Endocrine: Negative.    Genitourinary: Negative.    Musculoskeletal: Negative.    Skin: Negative.    Allergic/Immunologic: Negative.    Neurological: Negative.    Hematological: Negative.    Psychiatric/Behavioral: Negative.    All other systems reviewed and are negative.    Vitals:    10/20/22 1324   BP: 119/70   BP Location: Left upper arm   Patient Position: Sitting   Pulse: 86   Resp: 18   Temp: 36.7 °C (98 °F)   SpO2: 98%   Weight: 76.7 kg (169 lb)   Height: 1.803 m (5' 11\")      Current Outpatient Medications   Medication Sig Dispense Refill    busPIRone (BUSPAR) 10 mg tablet Take 1 tablet (10 mg total) by mouth 2 (two) times a day. 90 tablet 1    calcium carbonate (TUMS) 200 mg calcium (500 mg) chewable tablet Take 500 mg by mouth.      escitalopram (LEXAPRO) 20 mg tablet Take 1 tablet (20 mg total) by mouth daily. 90 tablet 1    multivitamin with minerals tablet Take 1 tablet by mouth daily.      pantoprazole (PROTONIX) 40 mg EC tablet Take 1 tablet (40 mg total) by mouth daily. 90 tablet 1 "    thiamine 100 mg tablet Take 100 mg by mouth daily.      traZODone (DESYREL) 50 mg tablet Take 1 tablet (50 mg total) by mouth nightly as needed (insomnia). 90 tablet 1    folic acid (FOLVITE) 1 mg tablet Take 1 tablet (1 mg total) by mouth daily. 90 tablet 1     No current facility-administered medications for this visit.      Social History     Tobacco Use    Smoking status: Every Day     Types: Electronic Cigarette    Smokeless tobacco: Never   Substance Use Topics    Alcohol use: Yes     Comment: His girlfriend reports that if he does not have some alcoholic beverages especially in the evening he cannot sleep, 4 handles of vodka in past week    Drug use: Never      Family History   Problem Relation Age of Onset    Diabetes Biological Mother     Heart disease Biological Mother     No Known Problems Biological Father     No Known Problems Biological Brother     Heart disease Maternal Grandmother         History reviewed. No pertinent past medical history.     Objective     Physical Exam  Constitutional:       Appearance: Normal appearance. He is normal weight.   Cardiovascular:      Rate and Rhythm: Normal rate and regular rhythm.      Heart sounds: Normal heart sounds.   Pulmonary:      Effort: Pulmonary effort is normal.      Breath sounds: Normal breath sounds.   Abdominal:      Palpations: Abdomen is soft.   Skin:     General: Skin is warm.   Neurological:      Mental Status: He is alert and oriented to person, place, and time.   Psychiatric:         Mood and Affect: Mood normal.         Problem List Items Addressed This Visit        Nervous    Other insomnia    Relevant Medications    traZODone (DESYREL) 50 mg tablet       Mental Health    Alcohol dependence with acute alcoholic intoxication without complication (CMS/HCC)    Relevant Orders    Comprehensive metabolic panel    Lipase    Anxiety and depression    Relevant Medications    busPIRone (BUSPAR) 10 mg tablet    escitalopram (LEXAPRO) 20  mg tablet    traZODone (DESYREL) 50 mg tablet   Other Visit Diagnoses     Need for influenza vaccination    -  Primary    Relevant Orders    Influenza vaccine quadrivalent preservative free 6 mon and older IM (FluLaval) (Completed)          Assessment/Plan     He has been drinking again lately and was in the hospital recently on October 8 at the [facility].  He was admitted for 3 days but we do not have the records available.  Since discharge he did start drinking again and is averaging about one third of a gallon of vodka daily but he had his last drink this past Monday.  He reports feeling a bit fuzzy but does not have any physical complaints.  He has been taking his medications for depression and insomnia.  He does not feel ready for rehab yet but does acknowledge the importance of it.  He does have hand alcoholics anonymous meetings regularly.  We will order a comprehensive metabolic panel, and a lipase panel.  He did have blood work done since his discharge but we do not have access to that we will try to track down these results.  He will continue his regular medication for anxiety and depression and refills were provided today and I will see him back in the office in 3 months.  For influenza prevention he will receive this years flu shot today.

## 2022-12-13 ENCOUNTER — TELEPHONE (OUTPATIENT)
Dept: PRIMARY CARE | Facility: CLINIC | Age: 32
End: 2022-12-13
Payer: COMMERCIAL

## 2022-12-13 NOTE — TELEPHONE ENCOUNTER
Pt  Was discharged 12/10/2022 from [facility] For alcohol withdraw seizure.  Where can I put in DR Castrejon schedule

## 2022-12-14 NOTE — TELEPHONE ENCOUNTER
I called pt and lmom to call the office   I put  pt in  on 12/15 at 3 to hold the spot. Im waiting for pt to call back

## 2022-12-30 ENCOUNTER — TELEPHONE (OUTPATIENT)
Dept: PRIMARY CARE | Facility: CLINIC | Age: 32
End: 2022-12-30
Payer: COMMERCIAL

## 2022-12-30 DIAGNOSIS — F10.220: Primary | ICD-10-CM

## 2022-12-30 DIAGNOSIS — K29.01 ACUTE GASTRITIS WITH HEMORRHAGE, UNSPECIFIED GASTRITIS TYPE: ICD-10-CM

## 2022-12-30 NOTE — TELEPHONE ENCOUNTER
Call from pt. Would like a new lab order. Wants to have done now thru [facility]. Request order be faxed to [phone]. Has appt with Dr Murphy on 1/3/23 and wants to get labs done before that appt.

## 2023-01-03 ENCOUNTER — OFFICE VISIT (OUTPATIENT)
Dept: PRIMARY CARE | Facility: CLINIC | Age: 33
End: 2023-01-03
Payer: COMMERCIAL

## 2023-01-03 DIAGNOSIS — F10.930 ALCOHOL WITHDRAWAL SEIZURE WITHOUT COMPLICATION (CMS/HCC): ICD-10-CM

## 2023-01-03 DIAGNOSIS — F32.A ANXIETY AND DEPRESSION: ICD-10-CM

## 2023-01-03 DIAGNOSIS — F10.21: ICD-10-CM

## 2023-01-03 DIAGNOSIS — Z09 HOSPITAL DISCHARGE FOLLOW-UP: Primary | ICD-10-CM

## 2023-01-03 DIAGNOSIS — R56.9 ALCOHOL WITHDRAWAL SEIZURE WITHOUT COMPLICATION (CMS/HCC): ICD-10-CM

## 2023-01-03 DIAGNOSIS — F41.9 ANXIETY AND DEPRESSION: ICD-10-CM

## 2023-01-03 PROCEDURE — 99214 OFFICE O/P EST MOD 30 MIN: CPT | Performed by: FAMILY MEDICINE

## 2023-01-03 ASSESSMENT — ENCOUNTER SYMPTOMS
ENDOCRINE NEGATIVE: 1
PSYCHIATRIC NEGATIVE: 1
GASTROINTESTINAL NEGATIVE: 1
HEMATOLOGIC/LYMPHATIC NEGATIVE: 1
NEUROLOGICAL NEGATIVE: 1
RESPIRATORY NEGATIVE: 1
EYES NEGATIVE: 1
CARDIOVASCULAR NEGATIVE: 1
MUSCULOSKELETAL NEGATIVE: 1
CONSTITUTIONAL NEGATIVE: 1
ALLERGIC/IMMUNOLOGIC NEGATIVE: 1

## 2023-01-03 NOTE — PROGRESS NOTES
Subjective      Patient ID: Jeff Hobbs is a 32 y.o. male     HPI:  He was admitted to [facility] in [facility] for evaluation and management of seizures.  His brother had witnessed 2 seizures at home and then 2 more seizures were observed in the emergency department by emergency room staff.  He had taken his last drink 1 to 2 days before his presentation to the emergency department and these were thought to be generalized tonic-clonic seizures each of which lasted a few minutes.  He was given IV lorazepam and Valium and was loaded with Keppra.  There is no history of trauma to the head.  He had an EEG done and as well as a consultation by neurology.  He had some initial LFT abnormalities and these were trended and improved.  He had transaminitis as well as an elevated bilirubin level both elevations thought to be secondary to injury from alcohol.  There was an elevated Ammonium level as well but no history of cirrhosis however.  His blood work did show improvement during his hospital stay.  He was discharged to home and has been sober since then.  He has been going to AA meetings regularly.    The following have been reviewed and updated as appropriate in this visit:   Tobacco  Allergies  Meds  Problems  Med Hx  Surg Hx  Fam Hx       Review of Systems   Constitutional: Negative.    HENT: Negative.    Eyes: Negative.    Respiratory: Negative.    Cardiovascular: Negative.    Gastrointestinal: Negative.    Endocrine: Negative.    Genitourinary: Negative.    Musculoskeletal: Negative.    Skin: Negative.    Allergic/Immunologic: Negative.    Neurological: Negative.    Hematological: Negative.    Psychiatric/Behavioral: Negative.    All other systems reviewed and are negative.    There were no vitals filed for this visit.   Current Outpatient Medications   Medication Sig Dispense Refill   • busPIRone (BUSPAR) 10 mg tablet Take 1 tablet (10 mg total) by mouth 2 (two) times a day. 90  tablet 1   • calcium carbonate (TUMS) 200 mg calcium (500 mg) chewable tablet Take 500 mg by mouth.     • escitalopram (LEXAPRO) 20 mg tablet Take 1 tablet (20 mg total) by mouth daily. 90 tablet 1   • multivitamin with minerals tablet Take 1 tablet by mouth daily.     • pantoprazole (PROTONIX) 40 mg EC tablet Take 1 tablet (40 mg total) by mouth daily. 90 tablet 1   • thiamine 100 mg tablet Take 100 mg by mouth daily.     • traZODone (DESYREL) 50 mg tablet Take 1 tablet (50 mg total) by mouth nightly as needed (insomnia). 90 tablet 1     No current facility-administered medications for this visit.      Social History     Tobacco Use   • Smoking status: Every Day     Types: Electronic Cigarette   • Smokeless tobacco: Never   Substance Use Topics   • Alcohol use: Yes     Comment: His girlfriend reports that if he does not have some alcoholic beverages especially in the evening he cannot sleep, 4 handles of vodka in past week   • Drug use: Never      Family History   Problem Relation Age of Onset   • Diabetes Biological Mother    • Heart disease Biological Mother    • No Known Problems Biological Father    • No Known Problems Biological Brother    • Heart disease Maternal Grandmother         History reviewed. No pertinent past medical history.     Objective     Physical Exam  Vitals reviewed.   Constitutional:       Appearance: Normal appearance. He is normal weight.   Cardiovascular:      Rate and Rhythm: Normal rate and regular rhythm.      Heart sounds: Normal heart sounds.   Pulmonary:      Effort: Pulmonary effort is normal.      Breath sounds: Normal breath sounds.   Abdominal:      Palpations: Abdomen is soft.   Skin:     General: Skin is warm.   Neurological:      Mental Status: He is alert and oriented to person, place, and time.   Psychiatric:         Mood and Affect: Mood normal.         Problem List Items Addressed This Visit        Nervous    Alcohol withdrawal seizure (CMS/HCC)       Mental Health     Anxiety and depression    Severe alcohol dependence in early remission (CMS/[facility])   Other Visit Diagnoses     Hospital discharge follow-up    -  Primary          Assessment/Plan     Hospital discharge follow-up: See HPI for details.  Recent hospitalization for treatment of alcohol withdrawal seizures.  He was admitted around December a and discharged after a 3-day stay.  I do not have access to the discharge summary.  He had 4 witnessed seizures and was placed on Keppra however he was not discharged on Keppra.  He was seen by neurology in the hospital and his license was suspended temporarily and this may be up to 6 months.  He is awaiting forms from [facility] to complete and does plan on seeing neurology in follow-up.  Overall he feels well physically and has not had anything to drink since prior to his hospitalizatio and I congratulated him on this.  He is continuing with alcoholics anonymous programs.  For anxiety depression he is continue taking Lexapro and BuSpar and does take trazodone as well for insomnia.  His blood work showed a normal lipase level at 46, normal CBC, and his liver functions were normal as well as his kidney function and all his electrolytes.  CO2 was slightly elevated at 32.1 but this not clinically significant.  Courage to continue with AA programs and continued abstinence from alcohol.

## 2023-02-13 ENCOUNTER — HOSPITAL ENCOUNTER (EMERGENCY)
Facility: HOSPITAL | Age: 33
Discharge: OTHER INSTITUTION | End: 2023-02-13
Attending: EMERGENCY MEDICINE
Payer: COMMERCIAL

## 2023-02-13 VITALS
SYSTOLIC BLOOD PRESSURE: 116 MMHG | OXYGEN SATURATION: 98 % | BODY MASS INDEX: 23.1 KG/M2 | RESPIRATION RATE: 16 BRPM | HEART RATE: 100 BPM | WEIGHT: 165 LBS | HEIGHT: 71 IN | DIASTOLIC BLOOD PRESSURE: 65 MMHG | TEMPERATURE: 98.9 F

## 2023-02-13 DIAGNOSIS — F10.920 ALCOHOLIC INTOXICATION WITHOUT COMPLICATION (CMS/HCC): Primary | ICD-10-CM

## 2023-02-13 LAB
ALBUMIN SERPL-MCNC: 4.7 G/DL (ref 3.4–5)
ALP SERPL-CCNC: 47 IU/L (ref 35–126)
ALT SERPL-CCNC: 33 IU/L (ref 16–63)
AMPHET UR QL SCN: NOT DETECTED
ANION GAP SERPL CALC-SCNC: 15 MEQ/L (ref 3–15)
APAP SERPL-MCNC: <10 UG/ML (ref 10–30)
AST SERPL-CCNC: 39 IU/L (ref 15–41)
BARBITURATES UR QL SCN: NOT DETECTED
BASOPHILS # BLD: 0.02 K/UL (ref 0.01–0.1)
BASOPHILS NFR BLD: 0.3 %
BENZODIAZ UR QL SCN: NOT DETECTED
BILIRUB SERPL-MCNC: 0.5 MG/DL (ref 0.3–1.2)
BUN SERPL-MCNC: 9 MG/DL (ref 8–20)
CALCIUM SERPL-MCNC: 8.7 MG/DL (ref 8.9–10.3)
CANNABINOIDS UR QL SCN: NOT DETECTED
CHLORIDE SERPL-SCNC: 98 MEQ/L (ref 98–109)
CO2 SERPL-SCNC: 28 MEQ/L (ref 22–32)
COCAINE UR QL SCN: NOT DETECTED
CREAT SERPL-MCNC: 0.8 MG/DL (ref 0.8–1.3)
DIFFERENTIAL METHOD BLD: ABNORMAL
EOSINOPHIL # BLD: 0.01 K/UL (ref 0.04–0.54)
EOSINOPHIL NFR BLD: 0.1 %
ERYTHROCYTE [DISTWIDTH] IN BLOOD BY AUTOMATED COUNT: 12.6 % (ref 11.6–14.4)
ETHANOL SERPL-MCNC: 280 MG/DL
GFR SERPL CREATININE-BSD FRML MDRD: >60 ML/MIN/1.73M*2
GLUCOSE SERPL-MCNC: 96 MG/DL (ref 70–99)
HCT VFR BLDCO AUTO: 40.7 % (ref 40.1–51)
HGB BLD-MCNC: 13.9 G/DL (ref 13.7–17.5)
IMM GRANULOCYTES # BLD AUTO: 0.02 K/UL (ref 0–0.08)
IMM GRANULOCYTES NFR BLD AUTO: 0.3 %
LYMPHOCYTES # BLD: 2.61 K/UL (ref 1.2–3.5)
LYMPHOCYTES NFR BLD: 37.8 %
MCH RBC QN AUTO: 30.6 PG (ref 28–33.2)
MCHC RBC AUTO-ENTMCNC: 34.2 G/DL (ref 32.2–36.5)
MCV RBC AUTO: 89.6 FL (ref 83–98)
MONOCYTES # BLD: 0.33 K/UL (ref 0.3–1)
MONOCYTES NFR BLD: 4.8 %
NEUTROPHILS # BLD: 3.92 K/UL (ref 1.7–7)
NEUTS SEG NFR BLD: 56.7 %
NRBC BLD-RTO: 0 %
OPIATES UR QL SCN: NOT DETECTED
PCP UR QL SCN: NOT DETECTED
PDW BLD AUTO: 8.4 FL (ref 9.4–12.4)
PLATELET # BLD AUTO: 264 K/UL (ref 150–350)
POTASSIUM SERPL-SCNC: 3.6 MEQ/L (ref 3.6–5.1)
PROT SERPL-MCNC: 7.7 G/DL (ref 6–8.2)
RBC # BLD AUTO: 4.54 M/UL (ref 4.5–5.8)
SALICYLATES SERPL-MCNC: <4 MG/DL
SARS-COV-2 RNA RESP QL NAA+PROBE: NEGATIVE
SODIUM SERPL-SCNC: 141 MEQ/L (ref 136–144)
WBC # BLD AUTO: 6.91 K/UL (ref 3.8–10.5)

## 2023-02-13 PROCEDURE — 99283 EMERGENCY DEPT VISIT LOW MDM: CPT

## 2023-02-13 PROCEDURE — 80307 DRUG TEST PRSMV CHEM ANLYZR: CPT | Performed by: PHYSICIAN ASSISTANT

## 2023-02-13 PROCEDURE — U0003 INFECTIOUS AGENT DETECTION BY NUCLEIC ACID (DNA OR RNA); SEVERE ACUTE RESPIRATORY SYNDROME CORONAVIRUS 2 (SARS-COV-2) (CORONAVIRUS DISEASE [COVID-19]), AMPLIFIED PROBE TECHNIQUE, MAKING USE OF HIGH THROUGHPUT TECHNOLOGIES AS DESCRIBED BY CMS-2020-01-R: HCPCS | Performed by: EMERGENCY MEDICINE

## 2023-02-13 PROCEDURE — 63700000 HC SELF-ADMINISTRABLE DRUG: Performed by: PHYSICIAN ASSISTANT

## 2023-02-13 PROCEDURE — 36415 COLL VENOUS BLD VENIPUNCTURE: CPT | Performed by: PHYSICIAN ASSISTANT

## 2023-02-13 PROCEDURE — 80053 COMPREHEN METABOLIC PANEL: CPT | Performed by: PHYSICIAN ASSISTANT

## 2023-02-13 PROCEDURE — 85025 COMPLETE CBC W/AUTO DIFF WBC: CPT | Performed by: PHYSICIAN ASSISTANT

## 2023-02-13 PROCEDURE — G0480 DRUG TEST DEF 1-7 CLASSES: HCPCS | Performed by: PHYSICIAN ASSISTANT

## 2023-02-13 RX ORDER — IBUPROFEN 200 MG
1 TABLET ORAL DAILY
Status: DISCONTINUED | OUTPATIENT
Start: 2023-02-13 | End: 2023-02-14 | Stop reason: HOSPADM

## 2023-02-13 RX ADMIN — NICOTINE 1 PATCH: 21 PATCH, EXTENDED RELEASE TRANSDERMAL at 19:38

## 2023-02-13 ASSESSMENT — ENCOUNTER SYMPTOMS
BACK PAIN: 0
SPEECH DIFFICULTY: 0
ABDOMINAL PAIN: 0
NERVOUS/ANXIOUS: 0
DYSPHORIC MOOD: 0
SEIZURES: 0
ACTIVITY CHANGE: 0
HYPERACTIVE: 0
COLOR CHANGE: 0
NECK PAIN: 0
VOMITING: 0
DIFFICULTY URINATING: 0
COUGH: 0
SHORTNESS OF BREATH: 0
HALLUCINATIONS: 0
WEAKNESS: 0
DECREASED CONCENTRATION: 0
NAUSEA: 0
SLEEP DISTURBANCE: 0
FEVER: 0
HEADACHES: 0
AGITATION: 0
DIARRHEA: 0

## 2023-02-13 NOTE — ED PROVIDER NOTES
Emergency Medicine Note  HPI   HISTORY OF PRESENT ILLNESS     Patient is a 32-year-old male with a history of alcoholism who presents today for medical clearance for [facility].  Patient is obviously intoxicated and was sent by [facility] for medical clearance.  Patient admits to drinking a quarter of a handle today which is typical for him.  Patient states he has been drinking since 2013 but did have 1 to 2-month.  Of not drinking which ended on Friday.  Pt has no medical complaints.  Patient does admit to a history of withdrawal seizures.  Pt denies: Fevers, congestion, cough, chest pain, shortness of breath, nausea/vomiting/diarrhea, syncope, seizure.            Patient History   PAST HISTORY     Reviewed from Nursing Triage:       Past Medical History:   Diagnosis Date   • Alcoholism (CMS/HCC)        Past Surgical History:   Procedure Laterality Date   • DENTAL SURGERY         Family History   Problem Relation Age of Onset   • Diabetes Biological Mother    • Heart disease Biological Mother    • No Known Problems Biological Father    • No Known Problems Biological Brother    • Heart disease Maternal Grandmother        Social History     Tobacco Use   • Smoking status: Every Day     Types: Electronic Cigarette   • Smokeless tobacco: Never   Vaping Use   • Vaping Use: Never used   Substance Use Topics   • Alcohol use: Yes     Comment: His girlfriend reports that if he does not have some alcoholic beverages especially in the evening he cannot sleep, 4 handles of vodka in past week   • Drug use: Never         Review of Systems   REVIEW OF SYSTEMS     Review of Systems   Constitutional: Negative for activity change and fever.   Respiratory: Negative for cough and shortness of breath.    Cardiovascular: Negative for chest pain and leg swelling.   Gastrointestinal: Negative for abdominal pain, diarrhea, nausea and vomiting.   Genitourinary: Negative for difficulty urinating.   Musculoskeletal: Negative for  back pain and neck pain.   Skin: Negative for color change.   Neurological: Negative for seizures, syncope, speech difficulty, weakness and headaches.   Psychiatric/Behavioral: Negative for agitation, behavioral problems, decreased concentration, dysphoric mood, hallucinations, self-injury, sleep disturbance and suicidal ideas. The patient is not nervous/anxious and is not hyperactive.          VITALS     ED Vitals    Date/Time Temp Pulse Resp BP SpO2 [facility]   02/13/23 1616 37.4 °C (99.3 °F) 97 18 120/72 98 % KP                       Physical Exam   PHYSICAL EXAM     Physical Exam  Vitals and nursing note reviewed.   Constitutional:       General: He is not in acute distress.     Appearance: Normal appearance. He is well-developed and normal weight. He is not ill-appearing, toxic-appearing or diaphoretic.      Comments: Pt is obviously intoxicated   HENT:      Head: Normocephalic and atraumatic.      Right Ear: External ear normal.      Left Ear: External ear normal.      Nose: Nose normal.   Eyes:      Conjunctiva/sclera: Conjunctivae normal.   Cardiovascular:      Rate and Rhythm: Normal rate and regular rhythm.   Pulmonary:      Effort: Pulmonary effort is normal.      Breath sounds: Normal breath sounds.   Abdominal:      General: There is no distension.      Palpations: Abdomen is soft. There is no mass.      Tenderness: There is no abdominal tenderness.   Musculoskeletal:         General: No tenderness or deformity. Normal range of motion.      Cervical back: Normal range of motion.   Skin:     General: Skin is warm and dry.   Neurological:      Mental Status: He is oriented to person, place, and time. Mental status is at baseline. He is lethargic.      Motor: No weakness.   Psychiatric:         Mood and Affect: Mood normal.         Behavior: Behavior normal.         Thought Content: Thought content normal.         Judgment: Judgment normal.           PROCEDURES     Procedures     DATA     Results     None           Imaging Results    None         No orders to display       Scoring tools                                  ED Course & MDM   MDM / ED COURSE / CLINICAL IMPRESSION / DISPO     MDM    ED Course as of 02/13/23 2212 Mon Feb 13, 2023   1659 I: pt sent from [place] for med clearance; pt already looking for ride to leave  P: labs, UDS [DEEPIKA]   1715 Pt wants to smoke.  Will give nicotine patch.  [DEEPIKA]   1717 Dr Zuluaga d/w crisis [DEEPIKA]   2032 Crisis is working on placement.  Demographics were sent to [facility]. [SB]   2212 Accepted to [facility] who will come to  patient. [JAMIE]      ED Course User Index  [DEEPIKA] Ene Baires PA C  [JAMIE] James Gaines MD  [SB] Reza Zuluaga MD     Clinical Impression      None               Ene Baires PA C  02/13/23 1704

## 2023-02-13 NOTE — ED ATTESTATION NOTE
Procedures  Physical Exam  Review of Systems  [facility]    2/13/20235:00 PM  I have personally seen and examined the patient. I personally performed the key components of the encounter and provided a substantive portion of the care and medical decision making for this patient.     I reviewed and agree with the PA/NP/Resident's assessment and plan of care, with any exceptions as documented below.    My focused history, examination, assessment, and plan of care of Jeff Hobbs is as follows:  The patient presents with acute alcohol intoxication.  Patient drinks 1/5 every day.  Patient does have a history of withdrawal seizures.  Patient is requesting detox.  Exam: Alert and oriented x3  Smell of alcohol  Impression/Plan/Medical Decision Making: IV, labs, crisis to see, observe  There is no evidence of withdrawal at this time    Vital Signs Review: Vital signs have been reviewed. The oxygen saturation is  SpO2: 98 % which is normal.    I was physically present for the key/critical portions of the following procedures: None    This document was created using Dragon dictation software.  There might be some typographical errors due to this technology.    We are in a pandemic with increased volumes and decreased capacity.      Reza Zuluaga MD  02/13/23 6278

## 2023-02-14 NOTE — BEHAVIORAL HEALTH CRISIS PROGRESS NOTE
02/13/2023 8:04 PM  Clinicals/ medical clearance faxed to [facility]. Pt  at 5:54 PM.    [facility] - [phone]- 1-1- Admissions-Phone Busy    02/13/2023 8:21 PM  Met with Pt bedside to provide update.    [facility] - [phone]- 1-1- Admissions-No answer rang for 8 minutes    02/13/2023 8:40 PM  [facility] - [phone]- 1-1- Admissions-No answer rang for 7 minutes    02/13/2023 8:50 PM  [facility] - [phone]- 1-1- Admissions- Busy Signal    02/13/2023 8:55 PM  [facility] - [phone]- 1-3-2- [facility] Nursing- No answer, rang for 2 minutes before disconnecting   [facility] - [phone]-  1-3-1- Admissions- Busy signal    02/13/2023 9:21 PM  [facility] - [phone]-  1-3-1- Admissions- rang for 3 minutes  [facility] - [phone]- 1-3-2- [facility] Nursing- Supervisor aware of Pt and will follow up with Admissions to have clinicals reviewed.    02/13/2023 9:38 PM  [facility]- [phone] [phone]- [facility] sup, provided [phone] as fax to send facesheet and labs. Faxed to [facility] nursing line [phone]. She is going to call back with ETA.    02/13/2023 9:54 PM  [facility]- [phone] [phone]- [facility] sup- will have transport to  pt in 20-30 minutes.

## 2023-02-14 NOTE — CONSULTS
Patient Information  Patient Name Address Race     Jeff Hobbs [id number] [place] [address] White     Patient Legal Name Legal Sex Date of Birth     Jeff Hobbs Male 1990     Room Ethnic Group Language     HB9 Not , /a, or Thai origin English     MRN Phone Numbers [facility]     [id number] Hm: [phone] Cell: [phone] Javier Murphy MD     Patient Contacts  Name Relation Home Work Mobile   Billy Miller Father   [phone]     Coverage Information (for Hospital Account #[id number])  F/O Payor/Plan Precert #   [facility] FOR YOU      Subscriber Subscriber #   Jeff Hobbs [id number]     Address Phone   [address]   [place] [address] [phone]     Patient Information     Patient Name  Jeff Hobbs MRN  [id number] Legal Sex  Male  Age  1990 (32 y.o.) [facility]         Admit Date Department Dept Phone    2023 [facility] Emergency Department [phone]      Alcohol Use     Yes.    Comments: His girlfriend reports that if he does not have some alcoholic beverages especially in the evening he cannot sleep, 4 handles of vodka in past week      Tobacco Use     Every Day; Types: Electronic Cigarette    Smokeless Tobacco: Never used smokeless tobacco.      Vaping Use     Never used       Problem List  Current as of 23 2140           Environmental and seasonal allergies Fatigue    Alcohol-induced insomnia (CMS/HCC) Alcohol dependence with acute alcoholic intoxication without complication (CMS/HCC)    Anxiety and depression Severe alcohol dependence in early remission (CMS/HCC)    Alcohol withdrawal seizure (CMS/HCC) Other insomnia    History of acute pancreatitis Lesion of right lobe of liver      Allergies    No Known Allergies     Results (last 24 hours)     Procedure Component Value Units Date/Time    ER toxicology screen, serum [[id number]]  (Abnormal) Collected: 23 1716    Order Status: Completed  Specimen: Blood, Venous Updated: 02/13/23 1754     Salicylate <4.0 mg/dL      Acetaminophen <10.0 ug/mL      Ethanol 280 mg/dL     Comprehensive metabolic panel [[id number]]  (Abnormal) Collected: 02/13/23 1716    Order Status: Completed Specimen: Blood, Venous Updated: 02/13/23 1753     Sodium 141 mEQ/L      Potassium 3.6 mEQ/L      Chloride 98 mEQ/L      CO2 28 mEQ/L      BUN 9 mg/dL      Creatinine 0.8 mg/dL      Glucose 96 mg/dL      Calcium 8.7 mg/dL      AST (SGOT) 39 IU/L      ALT (SGPT) 33 IU/L      Alkaline Phosphatase 47 IU/L      Total Protein 7.7 g/dL      Albumin 4.7 g/dL      Bilirubin, Total 0.5 mg/dL      eGFR >60.0 mL/min/1.73m*2      Anion Gap 15 mEQ/L     Urine drug screen (UDS) [[id number]]  (Normal) Collected: 02/13/23 1717    Order Status: Completed Specimen: Urine, Clean Catch Updated: 02/13/23 1747     PCP Scrn, Ur Not Detected     Benzodiazepine Ur Qual Not Detected     Cocaine Screen, Urine Not Detected     Amphetamine+Methamphetamine Screen, Ur Not Detected     Cannabinoid Screen, Urine Not Detected     Opiate Scrn, Ur Not Detected     Barbiturate Screen, Ur Not Detected    CBC and differential [[id number]]  (Abnormal) Collected: 02/13/23 1716    Order Status: Completed Specimen: Blood, Venous Updated: 02/13/23 1734     WBC 6.91 K/uL      RBC 4.54 M/uL      Hemoglobin 13.9 g/dL      Hematocrit 40.7 %      MCV 89.6 fL      MCH 30.6 pg      MCHC 34.2 g/dL      RDW 12.6 %      Platelets 264 K/uL      MPV 8.4 fL      Differential Type Auto     nRBC 0.0 %      Immature Granulocytes 0.3 %      Neutrophils 56.7 %      Lymphocytes 37.8 %      Monocytes 4.8 %      Eosinophils 0.1 %      Basophils 0.3 %      Immature Granulocytes, Absolute 0.02 K/uL      Neutrophils, Absolute 3.92 K/uL      Lymphocytes, Absolute 2.61 K/uL      Monocytes, Absolute 0.33 K/uL      Eosinophils, Absolute 0.01 K/uL      Basophils, Absolute 0.02 K/uL       Medical History     Diagnosis Date Comment Source    Alcoholism  (CMS/[facility])         Surgical History     Procedure Laterality Date Comment Source    DENTAL SURGERY          Employment History     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        Family and Education     Marital Status Number of Children Years of Education Highest Education Level    Single 0 16 Bachelor's degree (e.g., BA, AB, BS)      Social Identity     Preferred Language Ethnicity Race    English Not , /a, or Bhutanese origin White         Radiology Results (last 24 hours)    No matching results found     ECG Results (last 24 hours)    No matching results found     Microbiology Results     Procedure Component Value Units Date/Time    SARS-CoV-2 (COVID-19), PCR Nasopharynx [[id number]]  (Normal) Collected: 02/13/23 1940    Specimen: Nasopharyngeal Swab from Nasopharynx Updated: 02/13/23 2022    Narrative:      The following orders were created for panel order SARS-CoV-2 (COVID-19), PCR Nasopharynx.  Procedure                               Abnormality         Status                     ---------                               -----------         ------                     SARS-CoV-2 (COVID-19), P...[[id number]]  Normal              Final result                 Please view results for these tests on the individual orders.    SARS-CoV-2 (COVID-19), PCR Nasopharynx [[id number]]  (Normal) Collected: 02/13/23 1940    Specimen: Nasopharyngeal Swab from Nasopharynx Updated: 02/13/23 2022     SARS-CoV-2 (COVID-19) Negative    Narrative:      Testing performed using real-time PCR for detection of COVID-19. EUA approved validation studies performed on site.       Home Medications     Med List Status: RN Complete Set By: Divya Shah RN at 02/13/2023  4:18 PM        Taking? Start Date End Date Provider     busPIRone (BUSPAR) 10 mg tablet   10/20/22  --  Javier Murphy MD     Take 1 tablet (10 mg total) by mouth 2 (two) times a day.     calcium carbonate (TUMS) 200 mg calcium (500 mg)  chewable tablet   --  --  Regis Nicholas MD     Take 500 mg by mouth.     escitalopram (LEXAPRO) 20 mg tablet   10/20/22  04/18/23  Javier Murphy MD     Take 1 tablet (20 mg total) by mouth daily.     multivitamin with minerals tablet   20  --  Regis Nicholas MD     Take 1 tablet by mouth daily.     pantoprazole (PROTONIX) 40 mg EC tablet ()   22  Javier Murphy MD     Take 1 tablet (40 mg total) by mouth daily.     thiamine 100 mg tablet   --  --  Regis Nicholas MD     Take 100 mg by mouth daily.     traZODone (DESYREL) 50 mg tablet   10/20/22  04/18/23  Javier Murphy MD     Take 1 tablet (50 mg total) by mouth nightly as needed (insomnia).

## 2023-05-01 ENCOUNTER — PATIENT OUTREACH (OUTPATIENT)
Dept: CASE MANAGEMENT | Facility: CLINIC | Age: 33
End: 2023-05-01
Payer: COMMERCIAL

## 2023-05-01 RX ORDER — MELATONIN 5 MG
5 CAPSULE ORAL NIGHTLY
COMMUNITY

## 2023-05-01 ASSESSMENT — ENCOUNTER SYMPTOMS
NEUROLOGICAL NEGATIVE: 1
MUSCULOSKELETAL NEGATIVE: 1
RESPIRATORY NEGATIVE: 1
GASTROINTESTINAL NEGATIVE: 1
FEVER: 0

## 2023-05-01 NOTE — PROGRESS NOTES
NAME: Jeff Hobbs    MRN: [id number]    YOB: 1990    Event Review:    Initial TCM Patient Outreach Date: 05/01/23    Assessment completed with: Patient  Patient stated reason for hospitalization: Abdominal pain, vomiting  Discharge Diagnosis: ETOH detox    Patient readmitted in the last 30 days: No  Discharging Facility: [facility]  Date of Last Admission: 04/24/23  Date of Last Discharge: 04/28/23    Patient's perception of their health status since discharge: Improving    HPI: Jeff had a recent ER admit due to abdominal pain, nausea, vomiting and suicidal ideation, blurry vision brought on by recent relapse and abuse of ETOH. UDS positive for fentanyl. CT of Abd negative for acute pathology.  He was treated supportivley with IV fluids, Valium, potassium supplement, Zofran, and seen by Psych. He stopped taking his Buspar and Lexapro 2 weeks prior, had general tremors, tachycardia.  He was given both in patient and outpatient resources and a script for Trazodone. Records indicate he left [place].  He is currently staying with his parents in [place] [facility], near  [place]. He asks for resources that could provide 1:1 counseling provided by people who are in recovery. I will have our  reach out to him to discuss options. He is feeling fine, denies nausea, vomiting, tremors etc.   I did set him up with a PCP visit in one week.    Review of Systems   Constitutional: Negative for fever.   Respiratory: Negative.    Gastrointestinal: Negative.    Genitourinary: Negative.    Musculoskeletal: Negative.    Neurological: Negative.      Medication Review:    Medication Review: Yes     Reported by: Patient  Any new medications prescribed at discharge?: No  Is the patient having any side effects they believe may be caused by any medication additions or changes?: No     Do you have enough of your regularly prescribed medications?: Yes    Medication adherence problem?: (!)  Yes  Was a medication discrepancy indentified?: No    Nursing Interventions: No intervention needed  Reconciled the current and discharge medications: Yes  Reviewed AVS (Discharge Instructions)?: Yes    Acute Pain:    Acute pain: No    Chronic Pain:    Chronic pain: No    Diet/Nutrition:    Type of Diet: Regular    Home Care Services:  Home Care Interventions: No intervention required     Post-Discharge Durable Medical Equipment::    Durable Medical Equipment: None  Oxygen Use: No  DME Interventions: No intervention required    Home Management:    Living Arrangement: Alone (currently staying with his parents)  Support System:: Family  Type of Residence: Apartment  Home Monitoring: None  Any patient reported falls in the last 3 months?: No  Yazdanism or spiritual beliefs that impact treatment?: No    Appointment Scheduling:    PCP appointment scheduled: Yes  TCM Appointment Type: ERNA 14 Day  Appointment Date: 05/09/23  Appointment Time: 1530  Appointment Provider: Dr Murphy    Follow-Ups:    Relevant Specialist Follow-ups: Behavioral Health    Interventions/ Care Coordination:    Interventions/ Care Coordination: Encouraged patient to call PCP/Specialist    Reviewed signs/symptoms of worsening condition or complication that necessitate a call to the Physician's office.  Educated patient on access to care.  RN phone number given for future care management.    Angela Tapia RN  [phone]

## 2023-05-02 ENCOUNTER — PATIENT OUTREACH (OUTPATIENT)
Dept: CASE MANAGEMENT | Facility: CLINIC | Age: 33
End: 2023-05-02
Payer: COMMERCIAL

## 2023-05-02 NOTE — PROGRESS NOTES
"Social Work Note     received referral from Tricia Tapia RN ACM to help provided alcohol resources for pt.  Pt had mentioned to Tricia that hes interested in getting \"resources that could provide 1:1 counseling provided by people who are in recovery.\"    SW contacted pt but call went to voicemail.  SW has resource information from the AA website which would be helpful in getting him the information he is asking for, near his home. The website is:    [email or web address]/aa-meetings/find-aa-meetings-[email or web address]-me?city=[place]&state[place]    SW waiting to speak with pt to review and discuss.    SW to continue to follow up.  SW also provided SW contact information in voicemail left for pt.    ALY King  [facility]   [phone]    "

## 2023-05-03 ENCOUNTER — PATIENT OUTREACH (OUTPATIENT)
Dept: CASE MANAGEMENT | Facility: CLINIC | Age: 33
End: 2023-05-03
Payer: COMMERCIAL

## 2023-05-03 NOTE — PROGRESS NOTES
SOCIAL  WORK  NOTE    Situation:     received referral from Tricia Tapia RN ACM to help provided alcohol resources for pt. Pt is interested in getting information about 1:1 counseling provided by specialist who is recovered.    Background/Assessment:    SW received return call from pt.  Pt is a 32 year old pt. Pt is now living in [place] with his parents. States he is not working at this time either.    Pt states he has been to therapists in the past along with IOP programs and currently attends a daily AA meeting and has a sponsor.    Pt states that he is interested in finding a therapist with a background with addiction recovery as he feels that the therapist would understand him best and what his process is, in order to be most helpful for him is remaining sober.    Pt states during his 5 months of sobriety he was in inpatient in a recovery house in [place].  States he was able to remain about 30-45 days sober after that, before he relapsed.    Intervention/Plan:    SW discussed other resources but pt states that he has tried all the same resources in the past. He feels that if SW doesnt have someone to recommend that is a therapist who has been through recovery, then he doesnt feel that what SW has resources to offer that is different than what he has already gone through, as he has been through a number of different programs and treatments. SW had information on AA meetings in [place]. Pt states he is well versed on the AA meetings in his area.    SW had discussed with him to ask his contacts at  and his sponsor about any therapists that they may know that have been through the recovery process, in the meantime, to see if they have had experience with someone that may assist him, with them having the personal background he is looking for.    SW to see if there are additional options for pt. In the meantime, pt knows how to contact SW if needed prior to SW next contact with  pt.    Patient has 's contact information for future needs.  Patient is aware that  will continue to follow.    ALY King  [facility]   [phone]

## 2023-05-09 ENCOUNTER — OFFICE VISIT (OUTPATIENT)
Dept: PRIMARY CARE | Facility: CLINIC | Age: 33
End: 2023-05-09
Payer: COMMERCIAL

## 2023-05-09 VITALS
BODY MASS INDEX: 23.52 KG/M2 | SYSTOLIC BLOOD PRESSURE: 126 MMHG | WEIGHT: 168 LBS | RESPIRATION RATE: 18 BRPM | HEIGHT: 71 IN | DIASTOLIC BLOOD PRESSURE: 70 MMHG | OXYGEN SATURATION: 98 % | HEART RATE: 71 BPM

## 2023-05-09 DIAGNOSIS — G47.09 OTHER INSOMNIA: ICD-10-CM

## 2023-05-09 DIAGNOSIS — F41.9 ANXIETY AND DEPRESSION: ICD-10-CM

## 2023-05-09 DIAGNOSIS — Z09 HOSPITAL DISCHARGE FOLLOW-UP: Primary | ICD-10-CM

## 2023-05-09 DIAGNOSIS — F32.A ANXIETY AND DEPRESSION: ICD-10-CM

## 2023-05-09 PROCEDURE — 99214 OFFICE O/P EST MOD 30 MIN: CPT | Performed by: FAMILY MEDICINE

## 2023-05-09 PROCEDURE — 1111F DSCHRG MED/CURRENT MED MERGE: CPT | Performed by: FAMILY MEDICINE

## 2023-05-09 PROCEDURE — 3008F BODY MASS INDEX DOCD: CPT | Performed by: FAMILY MEDICINE

## 2023-05-09 RX ORDER — BUSPIRONE HYDROCHLORIDE 10 MG/1
10 TABLET ORAL 2 TIMES DAILY
Qty: 90 TABLET | Refills: 1 | Status: SHIPPED | OUTPATIENT
Start: 2023-05-09 | End: 2023-09-11 | Stop reason: SDUPTHER

## 2023-05-09 RX ORDER — ESCITALOPRAM OXALATE 20 MG/1
20 TABLET ORAL DAILY
Qty: 90 TABLET | Refills: 1 | Status: SHIPPED | OUTPATIENT
Start: 2023-05-09 | End: 2023-09-22 | Stop reason: SDUPTHER

## 2023-05-09 RX ORDER — TRAZODONE HYDROCHLORIDE 50 MG/1
50 TABLET ORAL NIGHTLY PRN
Qty: 90 TABLET | Refills: 1 | Status: SHIPPED | OUTPATIENT
Start: 2023-05-09 | End: 2023-09-22 | Stop reason: SDUPTHER

## 2023-05-09 RX ORDER — HYDROXYZINE PAMOATE 25 MG/1
25 CAPSULE ORAL 3 TIMES DAILY PRN
Qty: 30 CAPSULE | Refills: 2 | Status: SHIPPED | OUTPATIENT
Start: 2023-05-09 | End: 2023-06-08

## 2023-05-09 ASSESSMENT — ENCOUNTER SYMPTOMS
NEUROLOGICAL NEGATIVE: 1
HEMATOLOGIC/LYMPHATIC NEGATIVE: 1
CONSTITUTIONAL NEGATIVE: 1
CARDIOVASCULAR NEGATIVE: 1
PSYCHIATRIC NEGATIVE: 1
MUSCULOSKELETAL NEGATIVE: 1
EYES NEGATIVE: 1
RESPIRATORY NEGATIVE: 1
GASTROINTESTINAL NEGATIVE: 1
ENDOCRINE NEGATIVE: 1
ALLERGIC/IMMUNOLOGIC NEGATIVE: 1

## 2023-05-09 NOTE — PROGRESS NOTES
[facility] IN [place]       Reason for visit: Follow-up    HPI:  Jeff Hobbs is a 32 y.o. male presenting for follow-up after hospital discharge.    Patient readmitted in the last 30 days: No    Discharge Diagnosis: ETOH abuse, detox, FABIO, hypokalemia, suicidal ideation  Discharging Facility: [facility]  Date of Last Admission: 04/24/23  Date of Last Discharge: 04/28/23               Relevant Specialist Follow-ups: Behavioral Health    Initial TCM Patient Outreach Date: 05/01/23    Summary of Hospital Course: He was admitted to the [facility] on April 24 until April 28, 2023.  He has been experiencing abdominal pain nausea vomiting and suicidal ideation as well as blurry vision brought on by recent relapse and abuse of alcohol.  His CT of the abdomen was negative for any acute finding.  He was treated with IV fluids, Valium Zofran and seen by psychiatry.  He had stopped taking his BuSpar and Lexapro 2 weeks prior to his admission and was experiencing tremors and tachycardia.  He is currently staying with parents.  He is interested in a counselor conducted by someone who is in recovery and preferably a one-to-one basis.  His  is currently working on that.    Medications prescribed at discharge reconciled with current ambulatory medication list: Yes.    Inpatient testing (labs, imaging, cardiovascular) requiring follow-up:     History since hospital discharge: He has been sober since a day prior to his most recent admission and has been attending daily Alcoholics Anonymous meetings in [place].  He feels well overall.      Advance Care Planning Documents     Document Type Status Effective Date Expiration Date Received On Description    Advance Directives and Living Will Not Received        Power of  Not Received              Patient Active Problem List   Diagnosis   • Environmental and seasonal allergies   • Fatigue    • Alcohol-induced insomnia (CMS/HCC)   • Alcohol dependence with acute alcoholic intoxication without complication (CMS/HCC)   • Anxiety and depression   • Severe alcohol dependence in early remission (CMS/HCC)   • Alcohol withdrawal seizure (CMS/HCC)   • Other insomnia   • History of acute pancreatitis   • Lesion of right lobe of liver   • Hospital discharge follow-up     Past Medical History:   Diagnosis Date   • Alcoholism (CMS/HCC)      Past Surgical History:   Procedure Laterality Date   • DENTAL SURGERY       Social History     Socioeconomic History   • Marital status: Single     Spouse name: Not on file   • Number of children: 0   • Years of education: 16   • Highest education level: Bachelor's degree (e.g., BA, AB, BS)   Occupational History   • Occupation:  - [employer]   Tobacco Use   • Smoking status: Every Day     Types: Electronic Cigarette   • Smokeless tobacco: Never   Vaping Use   • Vaping status: Never Used   Substance and Sexual Activity   • Alcohol use: Yes     Comment: His girlfriend reports that if he does not have some alcoholic beverages especially in the evening he cannot sleep, 4 handles of vodka in past week   • Drug use: Never   • Sexual activity: Yes     Partners: Female   Other Topics Concern   • Not on file   Social History Narrative   • Not on file     Social Determinants of Health     Financial Resource Strain: Low Risk  (10/19/2020)    Overall Financial Resource Strain (CARDIA)    • Difficulty of Paying Living Expenses: Not hard at all   Food Insecurity: No Food Insecurity (2/13/2023)    Hunger Vital Sign    • Worried About Running Out of Food in the Last Year: Never true    • Ran Out of Food in the Last Year: Never true   Transportation Needs: No Transportation Needs (10/19/2020)    PRAPARE - Transportation    • Lack of Transportation (Medical): No    • Lack of Transportation (Non-Medical): No   Physical Activity: Not on file   Stress: Not on file   Social  Connections: Not on file   Intimate Partner Violence: Not on file   Housing Stability: Not on file     Family History   Problem Relation Age of Onset   • Diabetes Biological Mother    • Heart disease Biological Mother    • No Known Problems Biological Father    • No Known Problems Biological Brother    • Heart disease Maternal Grandmother      Patient has no known allergies.  Current Outpatient Medications   Medication Sig Dispense Refill   • busPIRone (BUSPAR) 10 mg tablet Take 1 tablet (10 mg total) by mouth 2 (two) times a day. 90 tablet 1   • calcium carbonate (TUMS) 200 mg calcium (500 mg) chewable tablet Take 500 mg by mouth.     • escitalopram (LEXAPRO) 20 mg tablet Take 1 tablet (20 mg total) by mouth daily. 90 tablet 1   • hydrOXYzine (VistariL) 25 mg capsule Take 1 capsule (25 mg total) by mouth 3 (three) times a day as needed for itching. 30 capsule 2   • melatonin 5 mg capsule Take 5 mg by mouth nightly.     • multivitamin with minerals tablet Take 1 tablet by mouth daily.     • thiamine 100 mg tablet Take 100 mg by mouth daily.     • traZODone (DESYREL) 50 mg tablet Take 1 tablet (50 mg total) by mouth nightly as needed (insomnia). 90 tablet 1   • pantoprazole (PROTONIX) 40 mg EC tablet Take 1 tablet (40 mg total) by mouth daily. 90 tablet 1     No current facility-administered medications for this visit.       ROS:  Review of Systems   Constitutional: Negative.    HENT: Negative.    Eyes: Negative.    Respiratory: Negative.    Cardiovascular: Negative.    Gastrointestinal: Negative.    Endocrine: Negative.    Genitourinary: Negative.    Musculoskeletal: Negative.    Skin: Negative.    Allergic/Immunologic: Negative.    Neurological: Negative.    Hematological: Negative.    Psychiatric/Behavioral: Negative.    All other systems reviewed and are negative.      Vitals:    05/09/23 1552   BP: 126/70   BP Location: Left upper arm   Patient Position: Sitting   Pulse: 71   Resp: 18   SpO2: 98%   Weight: 76.2  "kg (168 lb)   Height: 1.803 m (5' 11\")       EXAM:  Physical Exam  Vitals reviewed.   Constitutional:       Appearance: Normal appearance. He is normal weight.   Cardiovascular:      Rate and Rhythm: Normal rate and regular rhythm.      Heart sounds: Normal heart sounds.   Pulmonary:      Effort: Pulmonary effort is normal.      Breath sounds: Normal breath sounds.   Skin:     General: Skin is warm.   Neurological:      Mental Status: He is alert and oriented to person, place, and time.   Psychiatric:         Mood and Affect: Mood normal.         Procedures    Lab Results   Component Value Date    WBC 6.91 02/13/2023    HGB 13.9 02/13/2023    HCT 40.7 02/13/2023     02/13/2023    CHOL 170 09/28/2021    TRIG 59 09/28/2021    HDL 46 09/28/2021    ALT 33 02/13/2023    AST 39 02/13/2023     02/13/2023    K 3.6 02/13/2023    CL 98 02/13/2023    CREATININE 0.8 02/13/2023    BUN 9 02/13/2023    CO2 28 02/13/2023    TSH 0.801 11/04/2020         ASSESSMENT/PLAN:  Diagnoses and all orders for this visit:    Hospital discharge follow-up (Primary)  Assessment & Plan:  See HPI for full details.  He was recently discharged from [facility] on April 28 for treatment of abdominal pain and suicidal ideation that was induced by recent relapse of abuse of alcohol.  He has been sober since before his admission and currently reports feeling well.  He has been following with AA meetings on a regular basis and has firm formed a good working relationship with his group.  His sponsor is also nearby and he has a good relationship with him as well.  He is doing well current medications and I will give him a prescription for Vistaril for breakthrough anxiety, he has done well with this medication in the past.      Anxiety and depression  -     hydrOXYzine (VistariL) 25 mg capsule; Take 1 capsule (25 mg total) by mouth 3 (three) times a day as needed for itching.  -     busPIRone (BUSPAR) 10 mg tablet; Take 1 tablet (10 mg " total) by mouth 2 (two) times a day.  -     escitalopram (LEXAPRO) 20 mg tablet; Take 1 tablet (20 mg total) by mouth daily.    Other insomnia  -     traZODone (DESYREL) 50 mg tablet; Take 1 tablet (50 mg total) by mouth nightly as needed (insomnia).        Javier Murphy MD  5/9/2023

## 2023-05-09 NOTE — ASSESSMENT & PLAN NOTE
See HPI for full details.  He was recently discharged from [facility] on April 28 for treatment of abdominal pain and suicidal ideation that was induced by recent relapse of abuse of alcohol.  He has been sober since before his admission and currently reports feeling well.  He has been following with AA meetings on a regular basis and has firm formed a good working relationship with his group.  His sponsor is also nearby and he has a good relationship with him as well.  He is doing well current medications and I will give him a prescription for Vistaril for breakthrough anxiety, he has done well with this medication in the past.

## 2023-05-10 ENCOUNTER — PATIENT OUTREACH (OUTPATIENT)
Dept: CASE MANAGEMENT | Facility: CLINIC | Age: 33
End: 2023-05-10
Payer: COMMERCIAL

## 2023-05-10 NOTE — PROGRESS NOTES
Social Work Note     received referral from Tricia Tapia RN ACM to help provided alcohol resources for pt. Pt is interested in getting information about 1:1 counseling provided by specialist who is recovered.    SW contacted Emma with [employer] - [phone] to see if she had any suggestions about pt finding 1:1 therapy with a recovering specialist. She said that there isnt a way to narrow that down that way and she isnt sure of how that can be done.    SW contacted pt to discuss with him but no answer. SW left a message on pts phone to make him aware.    SW left contact information in voicemail for pt to call SW back when able.  SW to follow up.    ALY King  [facility]   [phone]

## 2023-05-17 ENCOUNTER — PATIENT OUTREACH (OUTPATIENT)
Dept: CASE MANAGEMENT | Facility: CLINIC | Age: 33
End: 2023-05-17
Payer: COMMERCIAL

## 2023-05-17 NOTE — PROGRESS NOTES
Social Work Note     received referral from Tricia Tapia RN ACM to help provided alcohol resources for pt. Pt is interested in getting information about 1:1 counseling provided by specialist who is recovered.    SW contacted pt and left a message. SW following to assist further for any additional needs or resources.    SW following to assist further, as needed. SW provided SW contact information so pt could call SW back, if preferred.    SW to follow up.    ALY King  [facility]   [phone]

## 2023-05-17 NOTE — PROGRESS NOTES
TCM outreach:    TCM outreach follow up call made today. Unable to reach patient, left a voicemail with Care Manager's call back information and purpose of call.    Angela Tapia RN Care Manager  [phone]

## 2023-05-19 ENCOUNTER — PATIENT OUTREACH (OUTPATIENT)
Dept: CASE MANAGEMENT | Facility: CLINIC | Age: 33
End: 2023-05-19
Payer: COMMERCIAL

## 2023-05-19 NOTE — PROGRESS NOTES
Social Work Note    SW called pt 5/17 and he returned SW call and left a message today.  SW called pt back and left a message today. No specific concerns or needs identified. SW continuing to follow to assist..    Pt has SW contact information to call back, as needed.  SW to follow up.    ALY King  [facility]   [phone]

## 2023-05-31 ENCOUNTER — PATIENT OUTREACH (OUTPATIENT)
Dept: CASE MANAGEMENT | Facility: CLINIC | Age: 33
End: 2023-05-31
Payer: COMMERCIAL

## 2023-06-02 ENCOUNTER — PATIENT OUTREACH (OUTPATIENT)
Dept: CASE MANAGEMENT | Facility: CLINIC | Age: 33
End: 2023-06-02
Payer: COMMERCIAL

## 2023-06-02 NOTE — PROGRESS NOTES
TCM outreach follow up call made today. Unable to reach patient, left a voicemail with Care Manager's call back information and purpose of call.    Angela Tapia RN Care Manager  [phone]

## 2023-06-12 ENCOUNTER — OFFICE VISIT (OUTPATIENT)
Dept: NEUROLOGY | Facility: CLINIC | Age: 33
End: 2023-06-12
Payer: COMMERCIAL

## 2023-06-12 VITALS — RESPIRATION RATE: 16 BRPM | HEART RATE: 80 BPM | SYSTOLIC BLOOD PRESSURE: 120 MMHG | DIASTOLIC BLOOD PRESSURE: 78 MMHG

## 2023-06-12 DIAGNOSIS — F10.930 ALCOHOL WITHDRAWAL SEIZURE WITHOUT COMPLICATION (CMS/HCC): ICD-10-CM

## 2023-06-12 DIAGNOSIS — R56.9 ALCOHOL WITHDRAWAL SEIZURE WITHOUT COMPLICATION (CMS/HCC): ICD-10-CM

## 2023-06-12 DIAGNOSIS — F41.9 ANXIETY AND DEPRESSION: ICD-10-CM

## 2023-06-12 DIAGNOSIS — F10.21: Primary | ICD-10-CM

## 2023-06-12 DIAGNOSIS — F32.A ANXIETY AND DEPRESSION: ICD-10-CM

## 2023-06-12 PROCEDURE — 99203 OFFICE O/P NEW LOW 30 MIN: CPT | Performed by: PSYCHIATRY & NEUROLOGY

## 2023-06-12 NOTE — LETTER
2023     Javier Murphy MD  [address]  [address]  [facility] [address]    Patient: Jeff Hobbs  YOB: 1990  Date of Visit: 2023      Dear Dr. Murphy:    Thank you for referring Jeff Hobbs to me for evaluation. Below are my notes for this consultation.    If you have questions, please do not hesitate to call me. I look forward to following your patient along with you.         Sincerely,        Sergo Camejo DO        CC: Sergo Church DO  2023  8:48 AM  Signed  Patient ID: Jeff Hobbs                              : 1990  MRN: [id number]                                            VISIT DATE: 2023    PRIMARY CARE PROVIDER: Javier Murphy MD    CONSULTING PHYSICIAN: Sergo Camejo DO    CHIEF COMPLAINT: Seizure    HISTORY OF PRESENT ILLNESS:  Dear Dr. Murphy,    I had the pleasure of seeing your patient Jeff Hobbs at the neurology clinic for a consultation.    As you know, Mr. Jeff Hobbs is a 32 y.o. left handed male with a history of alcohol withdrawal seizure, mood disorder (including depression, anxiety and alcohol abuse).        Seizure semiology:    1. Generalized convulsions    Recent seizure lo2020 (onset)  2022    Current AED:  None    Prior AED:  None    Epilepsy risk factors: Was born full term without known complications during pregnancy.  Met all developmental milestones at expected age.  No history of febrile seizure as an infant or young child.  No history of concussion.  No history of brain surgery.  No history of CNS infection such as meningitis/encephalitis.  No known family history of epilepsy.      Initial HPI 2023:  He is accompanied by his mother.    He was referred for evaluation of seizure.    The most recent seizure occurred on 2022.  This was after binging on excessive alcoholic beverages.  Had visual signs of seeing a moving box on TV and feeling dizzy / lightheaded.   This lasted for 10-15 seconds before losing consciousness.  Had whole body convulsions.  No tongue bite nor incontinence.  When he woke up, noted his balance was off as he tried to get up.      Has had several provoked seizures in the setting of alcohol abuse / withdrawal.  First was in 12/18/2020.  Was sitting on a couch drinking water, then felt dizzy  Then slumped over.  Was foaming at the mouth.  Taken to [facility].  Noted to have bruises of his tongue.  Reportedly drinking 3 full glasses of vodka daily.    Has had an extensive neurodiagnostic workup including CT/MRI brain and EEG which were unremarkable.    Has been trying to abstain from using alcohol.  Most recently was admitted to [facility] from 4/24 to 4/25/2023 for treatment of alcohol withdrawal symptoms.   He states he has not had an alcoholic beverage since leaving [facility].    Currently attending AA meeting.    Not seeing psych at this time.    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    Sleeps 4-5 hours per night.    Denies recreational drug use.    Smokes electronic cigarettes.    I reviewed his available outside medical records personally.  Results of relevant studies were summarized later in this note.    MEDICATIONS:   Current Outpatient Medications:   •  busPIRone (BUSPAR) 10 mg tablet, Take 1 tablet (10 mg total) by mouth 2 (two) times a day., Disp: 90 tablet, Rfl: 1  •  calcium carbonate (TUMS) 200 mg calcium (500 mg) chewable tablet, Take 500 mg by mouth., Disp: , Rfl:   •  escitalopram (LEXAPRO) 20 mg tablet, Take 1 tablet (20 mg total) by mouth daily., Disp: 90 tablet, Rfl: 1  •  melatonin 5 mg capsule, Take 5 mg by mouth nightly., Disp: , Rfl:   •  multivitamin with minerals tablet, Take 1 tablet by mouth daily., Disp: , Rfl:   •  thiamine 100 mg tablet, Take 100 mg by mouth daily., Disp: , Rfl:   •  traZODone (DESYREL) 50 mg tablet, Take 1 tablet (50  mg total) by mouth nightly as needed (insomnia)., Disp: 90 tablet, Rfl: 1  •  pantoprazole (PROTONIX) 40 mg EC tablet, Take 1 tablet (40 mg total) by mouth daily., Disp: 90 tablet, Rfl: 1    PAST MEDICAL HISTORY:  has a past medical history of Alcoholism (CMS/HCC).    PAST SURGICAL HISTORY:  has a past surgical history that includes Dental surgery.    SOCIAL HISTORY:   Social History     Tobacco Use   • Smoking status: Every Day     Types: Electronic Cigarette   • Smokeless tobacco: Never   Vaping Use   • Vaping Use: Never used   Substance Use Topics   • Alcohol use: Yes     Comment: His girlfriend reports that if he does not have some alcoholic beverages especially in the evening he cannot sleep, 4 handles of vodka in past week   • Drug use: Never     FAMILY HISTORY: family history includes Diabetes in his biological mother; Heart disease in his biological mother and maternal grandmother; No Known Problems in his biological brother and biological father.    ALLERGIES: has No Known Allergies.     REVIEW OF SYSTEMS:   All other systems reviewed and negative except as noted in the HPI.    PHYSICAL EXAM:  Visit Vitals  /78   Pulse 80   Resp 16     GENERAL APPEARANCE: Well appearing, normally nourished and normally developed male.  Not in acute distress.  Appears stated age.  HEAD: Normocephalic, atraumatic.    EYES:  Sclerae white. Conjunctivae clear.  EXTREMITIES: No clubbing, no cyanosis nor edema.  MUSCULOSKELETAL: Normal muscle bulk and tone.  No spasticity nor rigidity.  No resting tremor.  SKIN:  Dry, intact. No rashes noted. Warm to touch.  PSYCHIATRIC: Calm and cooperative with appropriate insight    NEUROLOGICAL EXAM:  MENTAL STATUS: MMSE score 29/30.  Alert, awake and oriented x date 6/12/2023, day Monday, season [place], [facility] floor [facility]nd, [facility].  3/3 immediate recall (1 trial).  3/3 delayed recall without clues  (epifanio, truck, apple).   5/5 serial  7's ([id number]).   Intact naming.  Intact repetition.  Followed written command.  Followed 3 step commands.  Wrote a sentence.  Copied intersecting pentagrams.  Spontaneous fluent speech output.  CRANIAL NERVES: Pupils are equal and round.  Extraocular movements are intact. No nystagmus. Symmetric smile. Uvula and tongue is midline. No dysarthria. No dysphonia.  MOTOR STRENGTH: Moves both upper and lower extremities spontaneously without gross asymmetric limb weakness.  COORDINATION/GAIT:  Intact station and gait. Has a normal arm swing and stride length. No listing or ataxia was seen.      LABORATORY STUDIES:  9/2/2021: Ethanol 303  8/6/2022: Ethanol 396  2/13/2023: Ethanol 280, UDS negative  CBC Results       02/13/23 04/28/22 01/24/22     1716 0537 1133    WBC 6.91 5.12 3.0    RBC 4.54 4.09 4.40    HGB 13.9 13.3 14.5    HCT 40.7 39.8 42.8    MCV 89.6 97.3 97    MCH 30.6 32.5 33.0    MCHC 34.2 33.4 33.9     208 147      BMP Results       02/13/23 01/24/22 09/28/21     1716 1133 1146     143 139    K 3.6 4.2 4.4    Cl 98 101 100    CO2 28 29 24    Glucose 96 99 80    BUN 9 6 8    Creatinine 0.8 0.92 0.93    Calcium 8.7 -- --    Anion Gap 15 -- --    EGFR >60.0 112 111      129 129     Labs were reviewed by me personally.        IMAGING STUDIES:     MRI brain noncontrast 12/19/2020:  MR HEAD ACUTE SEIZURE W AND WO IV CONTRAST    Impression     No acute intracranial abnormality.     No abnormal intracranial enhancement.  Narrative    MRI of the brain with and without contrast     Clinical History: Seizure.     Technique: Unenhanced followed by enhanced MRI of the brain was obtained. The patient was given 15 ml of DOTAREM intravenously.     Discussion: There is no evidence for restricted diffusion to indicate an acute ischemic lesion in the brain.     The brain parenchyma is normal in morphology and signal characteristics. Ventricles and extra-axial spaces are  "normal in size and configuration. Vessels of the base of the brain demonstrate normal flow-voids. The pituitary gland and orbits are grossly unremarkable. There is no mass-effect or midline shift.     There is no evidence for abnormal meningeal or parenchymal enhancement in the brain with contrast.     The calvarium demonstrates normal marrow signal.     Visualized paranasal sinuses and mastoid air cells are clear.    CT head noncontrast 4/21/2022:  No acute intracranial abnormality.       NEURODIAGNOSTIC STUDIES:    EEG 2/3/2022:  Tony Harkins MD     2/3/2022 10:38 AM                                        ROUTINE EEG REPORT     MEDICATIONS:   The patient’s medications include Atropine,   Lexapro, Folvite, Toradol, Ativan, Nitrostat, Zofran, Protonix,   Thiamine.     HISTORY: 32 yo male with an episode of LOC. On day of   presentation, he was shopping with his mother at [employer].  He   lifted a heavy item, then set it down again.  As he was setting   down item, he began to \"see different colors\".  He abruptly lost   consciousness, and does not recall anything else until he was   receiving medical attention.  Patient's mother reported to ER   that she did not witness his syncopal episode because she was in   another aisle, but saw him laying on floor.  No abnormal   movements reported.  No tongue-biting or urinary/fecal   incontinence.  Patient awoke spontaneously within   seconds-minutes, but was confused for about 20-30 minutes after   regaining consciousness.       INTRODUCTION:  A digital EEG was performed using the standard   international 10/20 system of electrode placement in addition to   one channel of EKG monitoring. This tracing captures wakefulness   through drowsiness.       ACTIVATION PROCEDURES: Photic stimulation and Hyperventilation.     DESCRIPTION OF RECORD:  In the most alert state the posterior   dominant rhythm is 9 Hz in frequency, medium amplitude seen in   the occipital region which " attenuates with eye opening.  There is   a small amount of low amplitude fronto-central beta activity   which is distributed symmetrically. Drowsiness was characterized   by disappearance of the alpha rhythm, slowing of the background,   and absence of muscle and eye blink artifact.      No epileptiform abnormalities or seizures were identified in   this record.       Photic stimulation was performed at multiple flash frequencies   and did not result in a change in the background.   Hyperventilation was performed and did not result in a change in   the background.     Heart rate is 80 bpm.     IMPRESSION:  This is a normal EEG recording capturing wakefulness   through drowsiness. No epileptiform abnormalities or seizures   were identified in this record.       CLINICAL CORRELATION:   A normal EEG does not rule out the   diagnosis of seizures.  If seizures remain a clinical concern, a   sleep-deprived EEG capturing deeper stages of sleep or prolonged   EEG monitoring may be helpful.     Tony Harkins MD   Clinical Neurophysiology   Ne[facility]      EEG was reviewed by me personally.      ASSESSMENT:  A 32 year old male with seizure-like episodes in the setting of alcohol abuse / withdrawal.    Suspect provoked alcohol withdrawal seizures.      RECOMMENDATIONS:    History of alcohol withdrawal seizures  - Advised abstinence from alcoholic beverage use for his overall health  - Seizure precautions should continue to be observed including: strict compliance with his medication regimen, avoid sleep deprivation (aim for 7-8 hours of sleep per night) and avoid illicit drug use as failure to do so could potentially lower seizure threshold.  - Referral made to psych.  - [employer] seizure form was filled out per patient request.    Vape smoking  - Advised abstinence from vaping/smoking for his overall health.    I have asked the patient to follow-up in the neurology clinic in 6-9 months or earlier if  needed.    Thank you for allowing me to participate in the care of your patient.  Please feel free to contact me at any time if you have any further questions.    I spent 37 minutes on this date of service performing the following activities: obtaining history, documenting, preparing for visit, obtaining / reviewing records, providing counseling and education, communicating results and coordinating care.  CPT 89026 based upon medical decision making and complexity of care.    Sergo Camejo DO  Neurologist  [facility]

## 2023-06-12 NOTE — PROGRESS NOTES
Patient ID: Jeff Hobbs                              : 1990  MRN: [id number]                                            VISIT DATE: 2023    PRIMARY CARE PROVIDER: Javier Murphy MD    CONSULTING PHYSICIAN: Sergo Camejo DO    CHIEF COMPLAINT: Seizure    HISTORY OF PRESENT ILLNESS:  Dear Dr. Murphy,    I had the pleasure of seeing your patient Jeff Hobbs at the neurology clinic for a consultation.    As you know, Mr. Jeff Hobbs is a 32 y.o. left handed male with a history of alcohol withdrawal seizure, mood disorder (including depression, anxiety and alcohol abuse).        Seizure semiology:    1. Generalized convulsions    Recent seizure lo2020 (onset)  2022    Current AED:  None    Prior AED:  None    Epilepsy risk factors: Was born full term without known complications during pregnancy.  Met all developmental milestones at expected age.  No history of febrile seizure as an infant or young child.  No history of concussion.  No history of brain surgery.  No history of CNS infection such as meningitis/encephalitis.  No known family history of epilepsy.      Initial HPI 2023:  He is accompanied by his mother.    He was referred for evaluation of seizure.    The most recent seizure occurred on 2022.  This was after binging on excessive alcoholic beverages.  Had visual signs of seeing a moving box on TV and feeling dizzy / lightheaded.  This lasted for 10-15 seconds before losing consciousness.  Had whole body convulsions.  No tongue bite nor incontinence.  When he woke up, noted his balance was off as he tried to get up.      Has had several provoked seizures in the setting of alcohol abuse / withdrawal.  First was in 2020.  Was sitting on a couch drinking water, then felt dizzy  Then slumped over.  Was foaming at the mouth.  Taken to [facility].  Noted to have bruises of his tongue.  Reportedly drinking 3 full glasses of vodka daily.    Has  had an extensive neurodiagnostic workup including CT/MRI brain and EEG which were unremarkable.    Has been trying to abstain from using alcohol.  Most recently was admitted to [facility] from 4/24 to 4/25/2023 for treatment of alcohol withdrawal symptoms.   He states he has not had an alcoholic beverage since leaving [facility].    Currently attending AA meeting.    Not seeing psych at this time.    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    Sleeps 4-5 hours per night.    Denies recreational drug use.    Smokes electronic cigarettes.    I reviewed his available outside medical records personally.  Results of relevant studies were summarized later in this note.    MEDICATIONS:   Current Outpatient Medications:   •  busPIRone (BUSPAR) 10 mg tablet, Take 1 tablet (10 mg total) by mouth 2 (two) times a day., Disp: 90 tablet, Rfl: 1  •  calcium carbonate (TUMS) 200 mg calcium (500 mg) chewable tablet, Take 500 mg by mouth., Disp: , Rfl:   •  escitalopram (LEXAPRO) 20 mg tablet, Take 1 tablet (20 mg total) by mouth daily., Disp: 90 tablet, Rfl: 1  •  melatonin 5 mg capsule, Take 5 mg by mouth nightly., Disp: , Rfl:   •  multivitamin with minerals tablet, Take 1 tablet by mouth daily., Disp: , Rfl:   •  thiamine 100 mg tablet, Take 100 mg by mouth daily., Disp: , Rfl:   •  traZODone (DESYREL) 50 mg tablet, Take 1 tablet (50 mg total) by mouth nightly as needed (insomnia)., Disp: 90 tablet, Rfl: 1  •  pantoprazole (PROTONIX) 40 mg EC tablet, Take 1 tablet (40 mg total) by mouth daily., Disp: 90 tablet, Rfl: 1    PAST MEDICAL HISTORY:  has a past medical history of Alcoholism (CMS/[facility]).    PAST SURGICAL HISTORY:  has a past surgical history that includes Dental surgery.    SOCIAL HISTORY:   Social History     Tobacco Use   • Smoking status: Every Day     Types: Electronic Cigarette   • Smokeless tobacco: Never   Vaping Use   • Vaping Use: Never used    Substance Use Topics   • Alcohol use: Yes     Comment: His girlfriend reports that if he does not have some alcoholic beverages especially in the evening he cannot sleep, 4 handles of vodka in past week   • Drug use: Never     FAMILY HISTORY: family history includes Diabetes in his biological mother; Heart disease in his biological mother and maternal grandmother; No Known Problems in his biological brother and biological father.    ALLERGIES: has No Known Allergies.     REVIEW OF SYSTEMS:   All other systems reviewed and negative except as noted in the HPI.    PHYSICAL EXAM:  Visit Vitals  /78   Pulse 80   Resp 16     GENERAL APPEARANCE: Well appearing, normally nourished and normally developed male.  Not in acute distress.  Appears stated age.  HEAD: Normocephalic, atraumatic.    EYES:  Sclerae white. Conjunctivae clear.  EXTREMITIES: No clubbing, no cyanosis nor edema.  MUSCULOSKELETAL: Normal muscle bulk and tone.  No spasticity nor rigidity.  No resting tremor.  SKIN:  Dry, intact. No rashes noted. Warm to touch.  PSYCHIATRIC: Calm and cooperative with appropriate insight    NEUROLOGICAL EXAM:  MENTAL STATUS: MMSE score 29/30.  Alert, awake and oriented x date 6/12/2023, day Monday, [place], [facility], floor 2nd, [facility], [facility].  3/3 immediate recall (1 trial).  3/3 delayed recall without clues (epifanio, truck, apple).   5/5 serial  7's ([id number]).   Intact naming.  Intact repetition.  Followed written command.  Followed 3 step commands.  Wrote a sentence.  Copied intersecting pentagrams.  Spontaneous fluent speech output.  CRANIAL NERVES: Pupils are equal and round.  Extraocular movements are intact. No nystagmus. Symmetric smile. Uvula and tongue is midline. No dysarthria. No dysphonia.  MOTOR STRENGTH: Moves both upper and lower extremities spontaneously without gross asymmetric limb weakness.  COORDINATION/GAIT:   Intact station and gait. Has a normal arm swing and stride length. No listing or ataxia was seen.      LABORATORY STUDIES:  9/2/2021: Ethanol 303  8/6/2022: Ethanol 396  2/13/2023: Ethanol 280, UDS negative  CBC Results       02/13/23 04/28/22 01/24/22     1716 0537 1133    WBC 6.91 5.12 3.0    RBC 4.54 4.09 4.40    HGB 13.9 13.3 14.5    HCT 40.7 39.8 42.8    MCV 89.6 97.3 97    MCH 30.6 32.5 33.0    MCHC 34.2 33.4 33.9     208 147      BMP Results       02/13/23 01/24/22 09/28/21     1716 1133 1146     143 139    K 3.6 4.2 4.4    Cl 98 101 100    CO2 28 29 24    Glucose 96 99 80    BUN 9 6 8    Creatinine 0.8 0.92 0.93    Calcium 8.7 -- --    Anion Gap 15 -- --    EGFR >60.0 112 111      129 129     Labs were reviewed by me personally.        IMAGING STUDIES:     MRI brain noncontrast 12/19/2020:  MR HEAD ACUTE SEIZURE W AND WO IV CONTRAST    Impression     No acute intracranial abnormality.     No abnormal intracranial enhancement.  Narrative    MRI of the brain with and without contrast     Clinical History: Seizure.     Technique: Unenhanced followed by enhanced MRI of the brain was obtained. The patient was given 15 ml of DOTAREM intravenously.     Discussion: There is no evidence for restricted diffusion to indicate an acute ischemic lesion in the brain.     The brain parenchyma is normal in morphology and signal characteristics. Ventricles and extra-axial spaces are normal in size and configuration. Vessels of the base of the brain demonstrate normal flow-voids. The pituitary gland and orbits are grossly unremarkable. There is no mass-effect or midline shift.     There is no evidence for abnormal meningeal or parenchymal enhancement in the brain with contrast.     The calvarium demonstrates normal marrow signal.     Visualized paranasal sinuses and mastoid air cells are clear.    CT head noncontrast 4/21/2022:  No acute intracranial abnormality.       NEURODIAGNOSTIC STUDIES:    EEG  "2/3/2022:  Tony Harkins MD     2/3/2022 10:38 AM                                        ROUTINE EEG REPORT     MEDICATIONS:   The patient’s medications include Atropine,   Lexapro, Folvite, Toradol, Ativan, Nitrostat, Zofran, Protonix,   Thiamine.     HISTORY: 30 yo male with an episode of LOC. On day of   presentation, he was shopping with his mother at [email or web address].  He   lifted a heavy item, then set it down again.  As he was setting   down item, he began to \"see different colors\".  He abruptly lost   consciousness, and does not recall anything else until he was   receiving medical attention.  Patient's mother reported to ER   that she did not witness his syncopal episode because she was in   another aisle, but saw him laying on floor.  No abnormal   movements reported.  No tongue-biting or urinary/fecal   incontinence.  Patient awoke spontaneously within   seconds-minutes, but was confused for about 20-30 minutes after   regaining consciousness.       INTRODUCTION:  A digital EEG was performed using the standard   international 10/20 system of electrode placement in addition to   one channel of EKG monitoring. This tracing captures wakefulness   through drowsiness.       ACTIVATION PROCEDURES: Photic stimulation and Hyperventilation.     DESCRIPTION OF RECORD:  In the most alert state the posterior   dominant rhythm is 9 Hz in frequency, medium amplitude seen in   the occipital region which attenuates with eye opening.  There is   a small amount of low amplitude fronto-central beta activity   which is distributed symmetrically. Drowsiness was characterized   by disappearance of the alpha rhythm, slowing of the background,   and absence of muscle and eye blink artifact.      No epileptiform abnormalities or seizures were identified in   this record.       Photic stimulation was performed at multiple flash frequencies   and did not result in a change in the background.   Hyperventilation was performed and did not " result in a change in   the background.     Heart rate is 80 bpm.     IMPRESSION:  This is a normal EEG recording capturing wakefulness   through drowsiness. No epileptiform abnormalities or seizures   were identified in this record.       CLINICAL CORRELATION:   A normal EEG does not rule out the   diagnosis of seizures.  If seizures remain a clinical concern, a   sleep-deprived EEG capturing deeper stages of sleep or prolonged   EEG monitoring may be helpful.     Tony Harkins MD   Clinical Neurophysiology   Neurology [facility]      EEG was reviewed by me personally.      ASSESSMENT:  A 32 year old male with seizure-like episodes in the setting of alcohol abuse / withdrawal.    Suspect provoked alcohol withdrawal seizures.      RECOMMENDATIONS:    History of alcohol withdrawal seizures  - Advised abstinence from alcoholic beverage use for his overall health  - Seizure precautions should continue to be observed including: strict compliance with his medication regimen, avoid sleep deprivation (aim for 7-8 hours of sleep per night) and avoid illicit drug use as failure to do so could potentially lower seizure threshold.  - Referral made to psych.  - [employer] [place] form was filled out per patient request.    Vape smoking  - Advised abstinence from vaping/smoking for his overall health.    I have asked the patient to follow-up in the neurology clinic in 6-9 months or earlier if needed.    Thank you for allowing me to participate in the care of your patient.  Please feel free to contact me at any time if you have any further questions.    I spent 37 minutes on this date of service performing the following activities: obtaining history, documenting, preparing for visit, obtaining / reviewing records, providing counseling and education, communicating results and coordinating care.  CPT 15042 based upon medical decision making and complexity of care.    Sergo Camejo DO  Neurologist  [facility]

## 2023-06-14 ENCOUNTER — TELEPHONE (OUTPATIENT)
Dept: PRIMARY CARE | Facility: CLINIC | Age: 33
End: 2023-06-14
Payer: COMMERCIAL

## 2023-06-14 NOTE — TELEPHONE ENCOUNTER
Pt left vm requesting a form to be filled out for [employer] regarding history of substance abuse.  Pt states he has seizures last year and needed forms filled out, but had them filled out by neuro.  Please advise if you are willing to fill this paper work out form him.

## 2023-06-15 NOTE — TELEPHONE ENCOUNTER
Pt has already had neuro fill out their part for the seizures, so he just needs the substance abuse portion filled out. He said that you are familiar with his substance abuse history, so he wanted to know if you would be willing to fill only that part out.

## 2023-06-22 ENCOUNTER — TELEPHONE (OUTPATIENT)
Dept: PRIMARY CARE | Facility: CLINIC | Age: 33
End: 2023-06-22
Payer: COMMERCIAL

## 2023-06-22 NOTE — TELEPHONE ENCOUNTER
This patient needs a form completed.    Type of Form:  [place]  Patient PCP: Javier Murphy MD   Form Due Date:    Date of patient's last office visit: 5/9/2023   Patient notified of Fee?     [] Yes           [] N/A       [] No - specify reason: _________  How should form be processed after completion?  []   Call patient to  - Phone number:_______  []  Sent to patient via Patient Portal  []  Fax to Fax number:______  The form is being placed in the Clinical area for the RN.  Please address.  Thank you!      Gave forms to Ole

## 2023-07-03 NOTE — TELEPHONE ENCOUNTER
Pt called back about form . I told him  It was on dr funes and he has been out of the office. To please call back on Thursday or Friday

## 2023-07-05 NOTE — TELEPHONE ENCOUNTER
Discussed form with Dr Murphy. He will complete form based off pt's past and  most recent alcohol use

## 2023-07-06 NOTE — TELEPHONE ENCOUNTER
Pt is calling for an update on the substance abuse form that is on  Dr Murphy's desk.   kayla is looking into this    per kayla the form is not signed yet..

## 2023-08-16 ENCOUNTER — TELEPHONE (OUTPATIENT)
Dept: PRIMARY CARE | Facility: CLINIC | Age: 33
End: 2023-08-16
Payer: COMMERCIAL

## 2023-08-16 NOTE — TELEPHONE ENCOUNTER
This patient needs a form completed.    Type of Form:  [place] substance use amendment needed   Patient PCP: Javier Murphy MD   Form Due Date:    Date of patient's last office visit: 5/9/2023   Patient notified of Fee?     [] Yes           [] N/A       [] No - specify reason: _________  How should form be processed after completion?  []   Call patient to  - Phone number:_______  []  Sent to patient via Patient Portal  []  Fax to Fax number:______  The form is being placed in the Clinical area for the RN.  Please address.  Thank you!    Gave form to Ani, included my chart notes and copy of original form.

## 2023-09-11 DIAGNOSIS — F41.9 ANXIETY AND DEPRESSION: ICD-10-CM

## 2023-09-11 DIAGNOSIS — F32.A ANXIETY AND DEPRESSION: ICD-10-CM

## 2023-09-11 RX ORDER — BUSPIRONE HYDROCHLORIDE 10 MG/1
10 TABLET ORAL 2 TIMES DAILY
Qty: 90 TABLET | Refills: 1 | Status: SHIPPED | OUTPATIENT
Start: 2023-09-11 | End: 2023-09-12 | Stop reason: SDUPTHER

## 2023-09-11 NOTE — TELEPHONE ENCOUNTER
Patient called in today to see if his prescription had been sent in, he is out of medication    Buspar, 10 mg, 2 times daily    [facility]    Patient thought it had been requested back in august by the pharmacy

## 2023-09-11 NOTE — TELEPHONE ENCOUNTER
Medicine Refill Request    Last Office Visit: 5/9/2023   Last Consult Visit: Visit date not found  Last Telemedicine Visit: 3/2/2021 Javier Murphy MD    Next Appointment: 9/22/2023      Current Outpatient Medications:     busPIRone (BUSPAR) 10 mg tablet, Take 1 tablet (10 mg total) by mouth 2 (two) times a day., Disp: 90 tablet, Rfl: 1    calcium carbonate (TUMS) 200 mg calcium (500 mg) chewable tablet, Take 500 mg by mouth., Disp: , Rfl:     escitalopram (LEXAPRO) 20 mg tablet, Take 1 tablet (20 mg total) by mouth daily., Disp: 90 tablet, Rfl: 1    melatonin 5 mg capsule, Take 5 mg by mouth nightly., Disp: , Rfl:     multivitamin with minerals tablet, Take 1 tablet by mouth daily., Disp: , Rfl:     pantoprazole (PROTONIX) 40 mg EC tablet, Take 1 tablet (40 mg total) by mouth daily., Disp: 90 tablet, Rfl: 1    thiamine 100 mg tablet, Take 100 mg by mouth daily., Disp: , Rfl:     traZODone (DESYREL) 50 mg tablet, Take 1 tablet (50 mg total) by mouth nightly as needed (insomnia)., Disp: 90 tablet, Rfl: 1      BP Readings from Last 3 Encounters:   06/12/23 120/78   05/09/23 126/70   02/13/23 116/65       Recent Lab results:  Lab Results   Component Value Date    CHOL 170 09/28/2021   ,   Lab Results   Component Value Date    HDL 46 09/28/2021   ,   Lab Results   Component Value Date    LDLCALC 112 (H) 09/28/2021   ,   Lab Results   Component Value Date    TRIG 59 09/28/2021        Lab Results   Component Value Date    GLUCOSE 96 02/13/2023   , No results found for: HGBA1C      Lab Results   Component Value Date    CREATININE 0.8 02/13/2023       Lab Results   Component Value Date    TSH 0.801 11/04/2020         No results found for: HGBA1C

## 2023-09-12 DIAGNOSIS — F41.9 ANXIETY AND DEPRESSION: ICD-10-CM

## 2023-09-12 DIAGNOSIS — F32.A ANXIETY AND DEPRESSION: ICD-10-CM

## 2023-09-12 RX ORDER — BUSPIRONE HYDROCHLORIDE 10 MG/1
10 TABLET ORAL 2 TIMES DAILY
Qty: 180 TABLET | Refills: 1 | Status: SHIPPED | OUTPATIENT
Start: 2023-09-12 | End: 2023-09-22 | Stop reason: SDUPTHER

## 2023-09-22 ENCOUNTER — OFFICE VISIT (OUTPATIENT)
Dept: PRIMARY CARE | Facility: CLINIC | Age: 33
End: 2023-09-22
Payer: COMMERCIAL

## 2023-09-22 VITALS
HEIGHT: 71 IN | SYSTOLIC BLOOD PRESSURE: 123 MMHG | WEIGHT: 186 LBS | HEART RATE: 67 BPM | OXYGEN SATURATION: 98 % | RESPIRATION RATE: 18 BRPM | BODY MASS INDEX: 26.04 KG/M2 | DIASTOLIC BLOOD PRESSURE: 68 MMHG

## 2023-09-22 DIAGNOSIS — G47.09 OTHER INSOMNIA: ICD-10-CM

## 2023-09-22 DIAGNOSIS — Z00.00 ENCOUNTER FOR PREVENTATIVE ADULT HEALTH CARE EXAMINATION: Primary | ICD-10-CM

## 2023-09-22 DIAGNOSIS — Z23 NEED FOR INFLUENZA VACCINATION: ICD-10-CM

## 2023-09-22 DIAGNOSIS — F32.A ANXIETY AND DEPRESSION: ICD-10-CM

## 2023-09-22 DIAGNOSIS — F10.220: ICD-10-CM

## 2023-09-22 DIAGNOSIS — Z23 NEED FOR VACCINATION WITH 20-POLYVALENT PNEUMOCOCCAL CONJUGATE VACCINE: ICD-10-CM

## 2023-09-22 DIAGNOSIS — F41.9 ANXIETY AND DEPRESSION: ICD-10-CM

## 2023-09-22 PROCEDURE — 99395 PREV VISIT EST AGE 18-39: CPT | Mod: 25 | Performed by: FAMILY MEDICINE

## 2023-09-22 PROCEDURE — 3008F BODY MASS INDEX DOCD: CPT | Performed by: FAMILY MEDICINE

## 2023-09-22 PROCEDURE — 90677 PCV20 VACCINE IM: CPT | Performed by: FAMILY MEDICINE

## 2023-09-22 PROCEDURE — 90686 IIV4 VACC NO PRSV 0.5 ML IM: CPT | Performed by: FAMILY MEDICINE

## 2023-09-22 PROCEDURE — 90472 IMMUNIZATION ADMIN EACH ADD: CPT | Performed by: FAMILY MEDICINE

## 2023-09-22 PROCEDURE — 90471 IMMUNIZATION ADMIN: CPT | Performed by: FAMILY MEDICINE

## 2023-09-22 RX ORDER — TRAZODONE HYDROCHLORIDE 50 MG/1
50 TABLET ORAL NIGHTLY PRN
Qty: 90 TABLET | Refills: 1 | Status: SHIPPED | OUTPATIENT
Start: 2023-09-22 | End: 2023-12-12 | Stop reason: SDUPTHER

## 2023-09-22 RX ORDER — ESCITALOPRAM OXALATE 20 MG/1
20 TABLET ORAL DAILY
Qty: 90 TABLET | Refills: 1 | Status: SHIPPED | OUTPATIENT
Start: 2023-09-22 | End: 2023-12-12 | Stop reason: SDUPTHER

## 2023-09-22 RX ORDER — BUSPIRONE HYDROCHLORIDE 10 MG/1
10 TABLET ORAL 3 TIMES DAILY
Qty: 270 TABLET | Refills: 0 | Status: SHIPPED | OUTPATIENT
Start: 2023-09-22 | End: 2023-12-12 | Stop reason: SDUPTHER

## 2023-09-22 ASSESSMENT — ENCOUNTER SYMPTOMS
GASTROINTESTINAL NEGATIVE: 1
MUSCULOSKELETAL NEGATIVE: 1
CONSTITUTIONAL NEGATIVE: 1
PSYCHIATRIC NEGATIVE: 1
CARDIOVASCULAR NEGATIVE: 1
EYES NEGATIVE: 1
ALLERGIC/IMMUNOLOGIC NEGATIVE: 1
NEUROLOGICAL NEGATIVE: 1
HEMATOLOGIC/LYMPHATIC NEGATIVE: 1
ENDOCRINE NEGATIVE: 1
RESPIRATORY NEGATIVE: 1

## 2023-09-22 ASSESSMENT — PATIENT HEALTH QUESTIONNAIRE - PHQ9: SUM OF ALL RESPONSES TO PHQ9 QUESTIONS 1 & 2: 0

## 2023-09-22 NOTE — ASSESSMENT & PLAN NOTE
He has had a recent setback recently and has been drinking vodka again after being sober since June 2023.  We will contact local resources such as [facility] and perhaps the [facility].  I strongly suggested however that he contact his sponsor as he has not yet let him know of this setback.  He should explore outpatient options as soon as possible.

## 2023-09-22 NOTE — PROGRESS NOTES
"Subjective      Patient ID: Jeff Hobbs is a 32 y.o. male     HPI:  Annual exam.  Recently had a setback with alcohol, had been sober for almost 3 months, but started drinking again last weekend.    The following have been reviewed and updated as appropriate in this visit:   Tobacco  Allergies  Meds  Problems  Med Hx  Surg Hx  Fam Hx       Review of Systems   Constitutional: Negative.    HENT: Negative.    Eyes: Negative.    Respiratory: Negative.    Cardiovascular: Negative.    Gastrointestinal: Negative.    Endocrine: Negative.    Genitourinary: Negative.    Musculoskeletal: Negative.    Skin: Negative.    Allergic/Immunologic: Negative.    Neurological: Negative.    Hematological: Negative.    Psychiatric/Behavioral: Negative.    All other systems reviewed and are negative.    Vitals:    09/22/23 1349   BP: 123/68   BP Location: Left upper arm   Patient Position: Sitting   Pulse: 67   Resp: 18   SpO2: 98%   Weight: 84.4 kg (186 lb)   Height: 1.803 m (5' 11\")      Current Outpatient Medications   Medication Sig Dispense Refill    busPIRone (BUSPAR) 10 mg tablet Take 1 tablet (10 mg total) by mouth 3 (three) times a day. 270 tablet 0    calcium carbonate (TUMS) 200 mg calcium (500 mg) chewable tablet Take 500 mg by mouth.      escitalopram (LEXAPRO) 20 mg tablet Take 1 tablet (20 mg total) by mouth daily. 90 tablet 1    melatonin 5 mg capsule Take 5 mg by mouth nightly.      multivitamin with minerals tablet Take 1 tablet by mouth daily.      thiamine 100 mg tablet Take 100 mg by mouth daily.      traZODone (DESYREL) 50 mg tablet Take 1 tablet (50 mg total) by mouth nightly as needed (insomnia). 90 tablet 1     No current facility-administered medications for this visit.      Social History     Tobacco Use    Smoking status: Every Day     Types: Electronic Cigarette    Smokeless tobacco: Never   Vaping Use    Vaping Use: Never used   Substance Use Topics    Alcohol use: Yes     Comment: His " girlfriend reports that if he does not have some alcoholic beverages especially in the evening he cannot sleep, 4 handles of vodka in past week    Drug use: Never      Family History   Problem Relation Age of Onset    Diabetes Biological Mother     Heart disease Biological Mother     No Known Problems Biological Father     No Known Problems Biological Brother     Heart disease Maternal Grandmother         Past Medical History:   Diagnosis Date    Alcoholism (CMS/[facility])         Objective     Physical Exam  Vitals reviewed.   Constitutional:       Appearance: Normal appearance. He is normal weight.   HENT:      Right Ear: Tympanic membrane, ear canal and external ear normal. There is no impacted cerumen.      Left Ear: Tympanic membrane, ear canal and external ear normal. There is no impacted cerumen.   Eyes:      Conjunctiva/sclera: Conjunctivae normal.   Cardiovascular:      Rate and Rhythm: Normal rate and regular rhythm.      Heart sounds: Normal heart sounds.   Pulmonary:      Effort: Pulmonary effort is normal.      Breath sounds: Normal breath sounds.   Abdominal:      General: There is no distension.      Palpations: Abdomen is soft. There is no mass.      Tenderness: There is no abdominal tenderness. There is no guarding or rebound.      Hernia: A hernia (small umbilical hernia) is present.   Musculoskeletal:      Cervical back: Neck supple.   Skin:     General: Skin is warm.   Neurological:      Mental Status: He is alert and oriented to person, place, and time.   Psychiatric:         Mood and Affect: Mood normal.         Problem List Items Addressed This Visit        Nervous    Other insomnia    Relevant Medications    traZODone (DESYREL) 50 mg tablet       Mental Health    Anxiety and depression    Relevant Medications    busPIRone (BUSPAR) 10 mg tablet    traZODone (DESYREL) 50 mg tablet    escitalopram (LEXAPRO) 20 mg tablet       Other    Alcohol dependence with acute alcoholic intoxication without  complication (CMS/HCC)     He has had a recent setback recently and has been drinking vodka again after being sober since June 2023.  We will contact local resources such as [facility] and perhaps the [facility].  I strongly suggested however that he contact his sponsor as he has not yet let him know of this setback.  He should explore outpatient options as soon as possible.         Relevant Orders    Pneumococcal conjugate vaccine 20-valent IM (Completed)    Need for vaccination with 20-polyvalent pneumococcal conjugate vaccine    Encounter for preventative adult health care examination - Primary     The patient pursues a healthy lifestyle, exercises frequently, watches diet carefully, and maintains a healthy weight.  We discussed disease and injury prevention including all pertinent screenings, vaccinations, and healthy lifestyle choices.  He will receive the annual flu shot as well as the Prevnar 20 vaccinations today.  Blood work will be ordered today.  I will review the results and if there are any abnormal findings we will discuss in detail.  I plan on seeing the patient back in a year for the next annual exam, sooner if necessary.          Relevant Orders    Comprehensive metabolic panel    Lipid panel   Other Visit Diagnoses     Need for influenza vaccination        Relevant Orders    Influenza vaccine quadrivalent preservative free 6 mon and older IM (FluLaval) (Completed)          Assessment/Plan

## 2023-09-22 NOTE — ASSESSMENT & PLAN NOTE
The patient pursues a healthy lifestyle, exercises frequently, watches diet carefully, and maintains a healthy weight.  We discussed disease and injury prevention including all pertinent screenings, vaccinations, and healthy lifestyle choices.  He will receive the annual flu shot as well as the Prevnar 20 vaccinations today.  Blood work will be ordered today.  I will review the results and if there are any abnormal findings we will discuss in detail.  I plan on seeing the patient back in a year for the next annual exam, sooner if necessary.

## 2023-10-16 ENCOUNTER — TELEPHONE (OUTPATIENT)
Dept: PRIMARY CARE | Facility: CLINIC | Age: 33
End: 2023-10-16
Payer: COMMERCIAL

## 2023-10-16 DIAGNOSIS — Z00.00 ENCOUNTER FOR SCREENING AND PREVENTATIVE CARE: Primary | ICD-10-CM

## 2023-12-05 ENCOUNTER — TELEPHONE (OUTPATIENT)
Dept: NEUROLOGY | Facility: CLINIC | Age: 33
End: 2023-12-05
Payer: COMMERCIAL

## 2023-12-05 NOTE — TELEPHONE ENCOUNTER
I called patient for updated insurance info. I asked him to return a call to the office or send it via [employer].

## 2023-12-07 ENCOUNTER — HOSPITAL ENCOUNTER (INPATIENT)
Facility: HOSPITAL | Age: 33
LOS: 2 days | Discharge: HOME | DRG: 897 | End: 2023-12-09
Attending: EMERGENCY MEDICINE | Admitting: FAMILY MEDICINE
Payer: COMMERCIAL

## 2023-12-07 DIAGNOSIS — F10.20 ALCOHOL USE DISORDER, SEVERE, DEPENDENCE (HCC): ICD-10-CM

## 2023-12-07 DIAGNOSIS — F10.929 ALCOHOLIC INTOXICATION WITH COMPLICATION (HCC): Primary | ICD-10-CM

## 2023-12-07 PROBLEM — F17.290 NICOTINE DEPENDENCE DUE TO VAPING TOBACCO PRODUCT: Status: ACTIVE | Noted: 2023-12-07

## 2023-12-07 LAB
ALBUMIN SERPL BCP-MCNC: 4.9 G/DL (ref 3.5–5)
ALP SERPL-CCNC: 54 U/L (ref 34–104)
ALT SERPL W P-5'-P-CCNC: 40 U/L (ref 7–52)
AMPHETAMINES SERPL QL SCN: NEGATIVE
ANION GAP SERPL CALCULATED.3IONS-SCNC: 22 MMOL/L
AST SERPL W P-5'-P-CCNC: 47 U/L (ref 13–39)
BARBITURATES UR QL: NEGATIVE
BASOPHILS # BLD AUTO: 0.07 THOUSANDS/ÂΜL (ref 0–0.1)
BASOPHILS NFR BLD AUTO: 1 % (ref 0–1)
BENZODIAZ UR QL: NEGATIVE
BILIRUB SERPL-MCNC: 0.97 MG/DL (ref 0.2–1)
BUN SERPL-MCNC: 13 MG/DL (ref 5–25)
CALCIUM SERPL-MCNC: 8.7 MG/DL (ref 8.4–10.2)
CHLORIDE SERPL-SCNC: 96 MMOL/L (ref 96–108)
CO2 SERPL-SCNC: 18 MMOL/L (ref 21–32)
COCAINE UR QL: NEGATIVE
CREAT SERPL-MCNC: 1.03 MG/DL (ref 0.6–1.3)
EOSINOPHIL # BLD AUTO: 0.01 THOUSAND/ÂΜL (ref 0–0.61)
EOSINOPHIL NFR BLD AUTO: 0 % (ref 0–6)
ERYTHROCYTE [DISTWIDTH] IN BLOOD BY AUTOMATED COUNT: 12.8 % (ref 11.6–15.1)
ETHANOL SERPL-MCNC: 298 MG/DL
GFR SERPL CREATININE-BSD FRML MDRD: 96 ML/MIN/1.73SQ M
GLUCOSE SERPL-MCNC: 69 MG/DL (ref 65–140)
HCT VFR BLD AUTO: 44.4 % (ref 36.5–49.3)
HGB BLD-MCNC: 15.4 G/DL (ref 12–17)
IMM GRANULOCYTES # BLD AUTO: 0.02 THOUSAND/UL (ref 0–0.2)
IMM GRANULOCYTES NFR BLD AUTO: 0 % (ref 0–2)
LIPASE SERPL-CCNC: 23 U/L (ref 11–82)
LYMPHOCYTES # BLD AUTO: 2.29 THOUSANDS/ÂΜL (ref 0.6–4.47)
LYMPHOCYTES NFR BLD AUTO: 38 % (ref 14–44)
MAGNESIUM SERPL-MCNC: 2 MG/DL (ref 1.9–2.7)
MCH RBC QN AUTO: 31 PG (ref 26.8–34.3)
MCHC RBC AUTO-ENTMCNC: 34.7 G/DL (ref 31.4–37.4)
MCV RBC AUTO: 90 FL (ref 82–98)
METHADONE UR QL: NEGATIVE
MONOCYTES # BLD AUTO: 0.24 THOUSAND/ÂΜL (ref 0.17–1.22)
MONOCYTES NFR BLD AUTO: 4 % (ref 4–12)
NEUTROPHILS # BLD AUTO: 3.37 THOUSANDS/ÂΜL (ref 1.85–7.62)
NEUTS SEG NFR BLD AUTO: 57 % (ref 43–75)
NRBC BLD AUTO-RTO: 0 /100 WBCS
OPIATES UR QL SCN: NEGATIVE
OXYCODONE+OXYMORPHONE UR QL SCN: NEGATIVE
PCP UR QL: NEGATIVE
PLATELET # BLD AUTO: 300 THOUSANDS/UL (ref 149–390)
PMV BLD AUTO: 8.4 FL (ref 8.9–12.7)
POTASSIUM SERPL-SCNC: 3.5 MMOL/L (ref 3.5–5.3)
PROT SERPL-MCNC: 7.6 G/DL (ref 6.4–8.4)
RBC # BLD AUTO: 4.96 MILLION/UL (ref 3.88–5.62)
SODIUM SERPL-SCNC: 136 MMOL/L (ref 135–147)
THC UR QL: NEGATIVE
WBC # BLD AUTO: 6 THOUSAND/UL (ref 4.31–10.16)

## 2023-12-07 PROCEDURE — 93005 ELECTROCARDIOGRAM TRACING: CPT

## 2023-12-07 PROCEDURE — 80307 DRUG TEST PRSMV CHEM ANLYZR: CPT | Performed by: EMERGENCY MEDICINE

## 2023-12-07 PROCEDURE — C9113 INJ PANTOPRAZOLE SODIUM, VIA: HCPCS | Performed by: NURSE PRACTITIONER

## 2023-12-07 PROCEDURE — 83690 ASSAY OF LIPASE: CPT | Performed by: EMERGENCY MEDICINE

## 2023-12-07 PROCEDURE — 80053 COMPREHEN METABOLIC PANEL: CPT | Performed by: EMERGENCY MEDICINE

## 2023-12-07 PROCEDURE — HZ2ZZZZ DETOXIFICATION SERVICES FOR SUBSTANCE ABUSE TREATMENT: ICD-10-PCS | Performed by: EMERGENCY MEDICINE

## 2023-12-07 PROCEDURE — 82077 ASSAY SPEC XCP UR&BREATH IA: CPT | Performed by: EMERGENCY MEDICINE

## 2023-12-07 PROCEDURE — 96368 THER/DIAG CONCURRENT INF: CPT

## 2023-12-07 PROCEDURE — 36415 COLL VENOUS BLD VENIPUNCTURE: CPT | Performed by: EMERGENCY MEDICINE

## 2023-12-07 PROCEDURE — 96365 THER/PROPH/DIAG IV INF INIT: CPT

## 2023-12-07 PROCEDURE — 99284 EMERGENCY DEPT VISIT MOD MDM: CPT | Performed by: EMERGENCY MEDICINE

## 2023-12-07 PROCEDURE — 99223 1ST HOSP IP/OBS HIGH 75: CPT | Performed by: NURSE PRACTITIONER

## 2023-12-07 PROCEDURE — 96375 TX/PRO/DX INJ NEW DRUG ADDON: CPT

## 2023-12-07 PROCEDURE — 83735 ASSAY OF MAGNESIUM: CPT | Performed by: EMERGENCY MEDICINE

## 2023-12-07 PROCEDURE — 99284 EMERGENCY DEPT VISIT MOD MDM: CPT

## 2023-12-07 PROCEDURE — 85025 COMPLETE CBC W/AUTO DIFF WBC: CPT | Performed by: EMERGENCY MEDICINE

## 2023-12-07 RX ORDER — PANTOPRAZOLE SODIUM 40 MG/1
40 TABLET, DELAYED RELEASE ORAL
Status: DISCONTINUED | OUTPATIENT
Start: 2023-12-08 | End: 2023-12-09 | Stop reason: HOSPADM

## 2023-12-07 RX ORDER — ONDANSETRON 2 MG/ML
4 INJECTION INTRAMUSCULAR; INTRAVENOUS ONCE
Status: COMPLETED | OUTPATIENT
Start: 2023-12-07 | End: 2023-12-07

## 2023-12-07 RX ORDER — LANOLIN ALCOHOL/MO/W.PET/CERES
100 CREAM (GRAM) TOPICAL DAILY
Status: DISCONTINUED | OUTPATIENT
Start: 2023-12-08 | End: 2023-12-09 | Stop reason: HOSPADM

## 2023-12-07 RX ORDER — LORAZEPAM 2 MG/ML
2 INJECTION INTRAMUSCULAR EVERY 4 HOURS PRN
Status: DISCONTINUED | OUTPATIENT
Start: 2023-12-07 | End: 2023-12-09 | Stop reason: HOSPADM

## 2023-12-07 RX ORDER — BUSPIRONE HYDROCHLORIDE 30 MG/1
10 TABLET ORAL 3 TIMES DAILY
COMMUNITY

## 2023-12-07 RX ORDER — PANTOPRAZOLE SODIUM 40 MG/10ML
40 INJECTION, POWDER, LYOPHILIZED, FOR SOLUTION INTRAVENOUS ONCE
Status: COMPLETED | OUTPATIENT
Start: 2023-12-07 | End: 2023-12-07

## 2023-12-07 RX ORDER — FOLIC ACID 1 MG/1
1 TABLET ORAL DAILY
Status: DISCONTINUED | OUTPATIENT
Start: 2023-12-08 | End: 2023-12-09 | Stop reason: HOSPADM

## 2023-12-07 RX ORDER — LORAZEPAM 2 MG/ML
2 INJECTION INTRAMUSCULAR ONCE
Status: COMPLETED | OUTPATIENT
Start: 2023-12-07 | End: 2023-12-07

## 2023-12-07 RX ORDER — TRAZODONE HYDROCHLORIDE 50 MG/1
50 TABLET ORAL
COMMUNITY

## 2023-12-07 RX ORDER — TRAZODONE HYDROCHLORIDE 50 MG/1
50 TABLET ORAL
Status: DISCONTINUED | OUTPATIENT
Start: 2023-12-07 | End: 2023-12-09 | Stop reason: HOSPADM

## 2023-12-07 RX ORDER — ESCITALOPRAM OXALATE 10 MG/1
20 TABLET ORAL DAILY
Status: DISCONTINUED | OUTPATIENT
Start: 2023-12-08 | End: 2023-12-09 | Stop reason: HOSPADM

## 2023-12-07 RX ORDER — NICOTINE 21 MG/24HR
21 PATCH, TRANSDERMAL 24 HOURS TRANSDERMAL DAILY
Status: DISCONTINUED | OUTPATIENT
Start: 2023-12-07 | End: 2023-12-09 | Stop reason: HOSPADM

## 2023-12-07 RX ORDER — SODIUM CHLORIDE 9 MG/ML
125 INJECTION, SOLUTION INTRAVENOUS CONTINUOUS
Status: DISCONTINUED | OUTPATIENT
Start: 2023-12-07 | End: 2023-12-09 | Stop reason: HOSPADM

## 2023-12-07 RX ORDER — CHLORDIAZEPOXIDE HYDROCHLORIDE 25 MG/1
25 CAPSULE, GELATIN COATED ORAL EVERY 6 HOURS SCHEDULED
Status: DISCONTINUED | OUTPATIENT
Start: 2023-12-08 | End: 2023-12-09 | Stop reason: HOSPADM

## 2023-12-07 RX ORDER — ACETAMINOPHEN 325 MG/1
650 TABLET ORAL EVERY 6 HOURS PRN
Status: DISCONTINUED | OUTPATIENT
Start: 2023-12-07 | End: 2023-12-09 | Stop reason: HOSPADM

## 2023-12-07 RX ORDER — CHLORDIAZEPOXIDE HYDROCHLORIDE 25 MG/1
50 CAPSULE, GELATIN COATED ORAL ONCE
Status: COMPLETED | OUTPATIENT
Start: 2023-12-07 | End: 2023-12-07

## 2023-12-07 RX ORDER — BUSPIRONE HYDROCHLORIDE 10 MG/1
10 TABLET ORAL 3 TIMES DAILY
Status: DISCONTINUED | OUTPATIENT
Start: 2023-12-07 | End: 2023-12-09 | Stop reason: HOSPADM

## 2023-12-07 RX ORDER — ONDANSETRON 2 MG/ML
4 INJECTION INTRAMUSCULAR; INTRAVENOUS EVERY 6 HOURS PRN
Status: DISCONTINUED | OUTPATIENT
Start: 2023-12-07 | End: 2023-12-09 | Stop reason: HOSPADM

## 2023-12-07 RX ADMIN — FOLIC ACID 1 MG: 5 INJECTION, SOLUTION INTRAMUSCULAR; INTRAVENOUS; SUBCUTANEOUS at 19:34

## 2023-12-07 RX ADMIN — SODIUM CHLORIDE 125 ML/HR: 0.9 INJECTION, SOLUTION INTRAVENOUS at 21:06

## 2023-12-07 RX ADMIN — THIAMINE HYDROCHLORIDE 100 MG: 100 INJECTION, SOLUTION INTRAMUSCULAR; INTRAVENOUS at 18:55

## 2023-12-07 RX ADMIN — NICOTINE 21 MG: 21 PATCH, EXTENDED RELEASE TRANSDERMAL at 21:06

## 2023-12-07 RX ADMIN — BUSPIRONE HYDROCHLORIDE 10 MG: 10 TABLET ORAL at 21:05

## 2023-12-07 RX ADMIN — CHLORDIAZEPOXIDE HYDROCHLORIDE 50 MG: 25 CAPSULE ORAL at 21:06

## 2023-12-07 RX ADMIN — TRAZODONE HYDROCHLORIDE 50 MG: 50 TABLET ORAL at 22:16

## 2023-12-07 RX ADMIN — ONDANSETRON 4 MG: 2 INJECTION INTRAMUSCULAR; INTRAVENOUS at 18:16

## 2023-12-07 RX ADMIN — LORAZEPAM 2 MG: 2 INJECTION INTRAMUSCULAR; INTRAVENOUS at 21:05

## 2023-12-07 RX ADMIN — PANTOPRAZOLE SODIUM 40 MG: 40 INJECTION, POWDER, FOR SOLUTION INTRAVENOUS at 21:05

## 2023-12-07 RX ADMIN — SODIUM CHLORIDE, SODIUM LACTATE, POTASSIUM CHLORIDE, AND CALCIUM CHLORIDE 1000 ML: .6; .31; .03; .02 INJECTION, SOLUTION INTRAVENOUS at 18:16

## 2023-12-07 NOTE — ED PROVIDER NOTES
History  Chief Complaint   Patient presents with    Alcohol Intoxication     Pt presented to this ED with friend and family member stating on a drinking binge for past 5 days drinking 1/3 to 1/2 a handle vodka with history of seizures during withdrawal. Last drink at 1700 today. Pt c/o abdominal pain at this time. Pt agreeable to detox. 45-year-old male with a history of alcohol use disorder is presenting to the emergency room with a friend and an aunt with patient requesting "detox."  Patient has been seen previously in other facilities for same. Patient was last in a rehab facility in 2022. Patient and friend report that patient has a history of alcohol withdrawal seizure. Patient apparently has been drinking more heavily since Thanksgiving of this year. Last drink was approximately 1 hour prior to arrival.      History provided by:  Patient, friend and relative  Alcohol Intoxication  Similar prior episodes: yes    Severity:  Severe  Progression:  Unchanged  Chronicity:  Chronic  Suspected agents:  Alcohol  Associated symptoms: seizures (No recent seizure. Previous history of.)    Associated symptoms: no abdominal pain, no agitation, no bladder incontinence, no bowel incontinence, no confusion, no hallucinations, no palpitations, no shortness of breath, no suicidal ideation, no vomiting and no weakness        Prior to Admission Medications   Prescriptions Last Dose Informant Patient Reported? Taking?   escitalopram (Lexapro) 20 mg tablet   No No   Sig: Take 1 tablet (20 mg total) by mouth daily   naltrexone (REVIA) 50 mg tablet   No No   Sig: Take 1 tablet (50 mg total) by mouth daily   topiramate (TOPAMAX) 25 mg tablet   No No   Sig: Take 1 tablet (25 mg total) by mouth daily at bedtime      Facility-Administered Medications: None       Past Medical History:   Diagnosis Date    Anxiety     High triglycerides        History reviewed. No pertinent surgical history. History reviewed.  No pertinent family history. I have reviewed and agree with the history as documented. E-Cigarette/Vaping    E-Cigarette Use Current Every Day User      E-Cigarette/Vaping Substances    Nicotine Yes      Social History     Tobacco Use    Smoking status: Never    Smokeless tobacco: Never   Vaping Use    Vaping Use: Every day    Substances: Nicotine   Substance Use Topics    Alcohol use: Yes     Alcohol/week: 17.0 standard drinks of alcohol     Types: 17 Shots of liquor per week    Drug use: Never       Review of Systems   Constitutional: Negative. Respiratory: Negative. Negative for shortness of breath. Cardiovascular: Negative. Negative for palpitations. Gastrointestinal:  Negative for abdominal pain, bowel incontinence and vomiting. Genitourinary:  Negative for bladder incontinence. Neurological:  Positive for seizures (No recent seizure. Previous history of.). Negative for weakness. Psychiatric/Behavioral:  Negative for agitation, confusion, hallucinations and suicidal ideas. The patient is not nervous/anxious and is not hyperactive. All other systems reviewed and are negative. Physical Exam  Physical Exam  Vitals and nursing note reviewed. Constitutional:       General: He is not in acute distress. Appearance: He is normal weight. He is not ill-appearing or toxic-appearing. HENT:      Head: Normocephalic and atraumatic. Right Ear: External ear normal.      Left Ear: External ear normal.      Nose: Nose normal.      Mouth/Throat:      Mouth: Mucous membranes are moist.   Cardiovascular:      Rate and Rhythm: Tachycardia present. Pulmonary:      Effort: Pulmonary effort is normal. No respiratory distress. Breath sounds: No stridor. No wheezing or rales. Abdominal:      General: Abdomen is flat. Musculoskeletal:         General: No signs of injury. Skin:     Coloration: Skin is not jaundiced or pale. Neurological:      General: No focal deficit present.       Mental Status: He is alert. Psychiatric:         Attention and Perception: He is inattentive. Mood and Affect: Affect is flat. Speech: Speech is delayed. Speech is not slurred. Behavior: Behavior is slowed. Thought Content: Thought content does not include suicidal ideation. Thought content does not include suicidal plan. Vital Signs  ED Triage Vitals [12/07/23 1805]   Temperature Pulse Respirations Blood Pressure SpO2   98.2 °F (36.8 °C) 94 18 128/79 92 %      Temp Source Heart Rate Source Patient Position - Orthostatic VS BP Location FiO2 (%)   Tympanic Monitor Lying Left arm --      Pain Score       5           Vitals:    12/07/23 1805 12/07/23 1815 12/07/23 1900   BP: 128/79 123/78 125/86   Pulse: 94 92 105   Patient Position - Orthostatic VS: Lying Lying Lying         Visual Acuity      ED Medications  Medications   multivitamin-minerals (CENTRUM) tablet 1 tablet (has no administration in time range)   folic acid 1 mg in sodium chloride 0.9 % 50 mL IVPB (1 mg Intravenous New Bag 12/7/23 1934)   lactated ringers bolus 1,000 mL (0 mL Intravenous Stopped 12/7/23 1916)   ondansetron (ZOFRAN) injection 4 mg (4 mg Intravenous Given 12/7/23 1816)   thiamine (VITAMIN B1) 100 mg in sodium chloride 0.9 % 50 mL IVPB (0 mg Intravenous Stopped 12/7/23 1925)       Diagnostic Studies  Results Reviewed       Procedure Component Value Units Date/Time    Rapid drug screen, urine [[id number]] Collected: 12/07/23 1934    Lab Status:  In process Specimen: Urine, Clean Catch Updated: 12/07/23 1937    Comprehensive metabolic panel [[id number]]  (Abnormal) Collected: 12/07/23 1816    Lab Status: Final result Specimen: Blood from Arm, Left Updated: 12/07/23 1839     Sodium 136 mmol/L      Potassium 3.5 mmol/L      Chloride 96 mmol/L      CO2 18 mmol/L      ANION GAP 22 mmol/L      BUN 13 mg/dL      Creatinine 1.03 mg/dL      Glucose 69 mg/dL      Calcium 8.7 mg/dL      AST 47 U/L      ALT 40 U/L      Alkaline Phosphatase 54 U/L      Total Protein 7.6 g/dL      Albumin 4.9 g/dL      Total Bilirubin 0.97 mg/dL      eGFR 96 ml/min/1.73sq m     Narrative:      National Kidney Disease Foundation guidelines for Chronic Kidney Disease (CKD):     Stage 1 with normal or high GFR (GFR > 90 mL/min/1.73 square meters)    Stage 2 Mild CKD (GFR = 60-89 mL/min/1.73 square meters)    Stage 3A Moderate CKD (GFR = 45-59 mL/min/1.73 square meters)    Stage 3B Moderate CKD (GFR = 30-44 mL/min/1.73 square meters)    Stage 4 Severe CKD (GFR = 15-29 mL/min/1.73 square meters)    Stage 5 End Stage CKD (GFR <15 mL/min/1.73 square meters)  Note: GFR calculation is accurate only with a steady state creatinine    Ethanol [[id number]]  (Abnormal) Collected: 12/07/23 1816    Lab Status: Final result Specimen: Blood from Arm, Left Updated: 12/07/23 1839     Ethanol Lvl 298 mg/dL     Magnesium [[id number]]  (Normal) Collected: 12/07/23 1816    Lab Status: Final result Specimen: Blood from Arm, Left Updated: 12/07/23 1839     Magnesium 2.0 mg/dL     CBC and differential [[id number]]  (Abnormal) Collected: 12/07/23 1816    Lab Status: Final result Specimen: Blood from Arm, Left Updated: 12/07/23 1824     WBC 6.00 Thousand/uL      RBC 4.96 Million/uL      Hemoglobin 15.4 g/dL      Hematocrit 44.4 %      MCV 90 fL      MCH 31.0 pg      MCHC 34.7 g/dL      RDW 12.8 %      MPV 8.4 fL      Platelets 006 Thousands/uL      nRBC 0 /100 WBCs      Neutrophils Relative 57 %      Immat GRANS % 0 %      Lymphocytes Relative 38 %      Monocytes Relative 4 %      Eosinophils Relative 0 %      Basophils Relative 1 %      Neutrophils Absolute 3.37 Thousands/µL      Immature Grans Absolute 0.02 Thousand/uL      Lymphocytes Absolute 2.29 Thousands/µL      Monocytes Absolute 0.24 Thousand/µL      Eosinophils Absolute 0.01 Thousand/µL      Basophils Absolute 0.07 Thousands/µL                    No orders to display              Procedures  Procedures         ED Course  ED Course as of 12/07/23 1939   u Dec 07, 2023   1814 Patient's last drink was recently as less than an hour prior to arrival.   1814 Patient CIWA score is less than 8   [address]): [address] [place] at this time remains less than 8. Patient does have a history of alcohol withdrawal seizures. Will discuss with medicine service about placing the patient inpatient here and considering transfer to [facility] tomorrow. No availability to transfer to [address] [place] [address] secondary to staffing at that facility. SBIRT 20yo+      Flowsheet Row Most Recent Value   Initial Alcohol Screen: US AUDIT-C     1. How often do you have a drink containing alcohol? 6 Filed at: 12/07/2023 1808   2. How many drinks containing alcohol do you have on a typical day you are drinking? 6 Filed at: 12/07/2023 1808   3a. Male UNDER 65: How often do you have five or more drinks on one occasion? 6 Filed at: 12/07/2023 1808   3b. FEMALE Any Age, or MALE 65+: How often do you have 4 or more drinks on one occassion? 6 Filed at: 12/07/2023 1808   Audit-C Score 24 Filed at: 12/07/2023 1808   Full Alcohol Screen: US AUDIT    4. How often during the last year have you found that you were not able to stop drinking once you had started? 4 Filed at: 12/07/2023 1808   5. How often during past year have you failed to do what was normally expected of you because of drinking? 4 Filed at: 12/07/2023 1808   6. How often in past year have you needed a first drink in the morning to get yourself going after a heavy drinking session? 4 Filed at: 12/07/2023 1808   7. How often in past year have you had feeling of guilt or remorse after drinking? 4 Filed at: 12/07/2023 1808   8. How often in past year have you been unable to remember what happened night before because you had been drinking? 4 Filed at: 12/07/2023 1808   9. Have you or someone else been injured as a result of your drinking?   2 Filed at: 12/07/2023 4906 10. Has a relative, friend, doctor or other health worker been concerned about your drinking and suggested you cut down? 4 Filed at: 12/07/2023 8670   AUDIT Total Score 50 Filed at: 12/07/2023 4524   ANNALISE: How many times in the past year have you. .. Used an illegal drug or used a prescription medication for non-medical reasons? Never Filed at: 12/07/2023 4849                      Medical Decision Making  Patient presented to the emergency department and a MSE was performed. The patient was evaluated for complaint related to acute mental health evaluation. Patient is potentially at risk for, but not limited to, alcohol withdrawal seizures, anxiety, depression, bipolar, suicidal ideation, homicidal ideation, substance use disorder or exacerbation of chronic mental health issues. Several of these diagnoses have been evaluated and ruled out by history and physical.  As needed, patient will be further evaluated with laboratory and imaging studies. Higher level diagnostics, such as CT imaging or ultrasound, may also be required. Please see work-up portion of the note for further evaluation of patient's risk. Socioeconomic factors were also considered as part of the decision-making process. Unless otherwise stated in the chart or patient is admitted as elsewhere documented, any previously prescribed medications will be maintained. Problems Addressed:  Alcohol use disorder, severe, dependence ([address]): chronic illness or injury with exacerbation, progression, or side effects of treatment that poses a threat to life or bodily functions  Alcoholic intoxication with complication [facility]): chronic illness or injury    Amount and/or Complexity of Data Reviewed  Labs: ordered. Decision-making details documented in ED Course. Risk  OTC drugs. Prescription drug management. Decision regarding hospitalization.              Disposition  Final diagnoses:   Alcoholic intoxication with complication ([address])   Alcohol use disorder, severe, dependence ([address])     Time reflects when diagnosis was documented in both MDM as applicable and the Disposition within this note       Time User Action Codes Description Comment    12/7/2023  6:28 PM Abbie Mujica Add [R83.087] Alcoholic intoxication with complication ([address])     86/9/6517  6:28 PM Abbie Mujica Add [F10.20] Alcohol use disorder, severe, dependence [facility])           ED Disposition       ED Disposition   Admit    Condition   Stable    Date/Time   Thu Dec 7, 2023 1939    Comment                  Follow-up Information    None         Patient's Medications   Discharge Prescriptions    No medications on file       No discharge procedures on file.     PDMP Review       None            ED Provider  Electronically Signed by             Bettye Moreno DO  12/07/23 1939

## 2023-12-07 NOTE — ED NOTES
Belongings checked at this time. No etoh present at bedside. Mother & patient's friend at bedside. Patient changed into hospital gown.      Frederick Cox, [place]  12/07/23 5901

## 2023-12-07 NOTE — LETTER
[facility]  [address]  [facility] [address]  Dept: [phone]    December 9, 2023     Patient: Franklin Matson   YOB: 1990   Date of Visit: 12/7/2023       To Whom it May Concern:    Ana Patel is under my professional care. He was seen in the hospital from 12/7/2023 to 12/09/23. He may return to work on 12/11/23 without limitations. If you have any questions or concerns, please don't hesitate to call.          Sincerely,          Shari aVlle MD

## 2023-12-07 NOTE — ED NOTES
Patient speaking to warm hand off at this time.  Mother remains at [address] [place] [address] Road [address] [place] [address], [address], [address]  12/07/23 2076

## 2023-12-07 NOTE — ED NOTES
This RN speaking to warm hand off at this time - will return phone call in approximately 5 mins.      Benjamin Herrera, [address]  12/07/23 4333

## 2023-12-07 NOTE — LETTER
December 9, 2023       No Recipients    Patient: Eduardo Gallegos   YOB: 1990   Date of Visit: 12/7/2023     Dear Dr. Fajardo Bread Recipients      Thank you for referring Marybran Santos to me for evaluation. Below are the relevant portions of my assessment and plan of care. If you have questions, please do not hesitate to call me. I look forward to following Eliud along with you.          Sincerely,        No name on file        CC:   No Recipients      Progress Notes:

## 2023-12-08 LAB
ALBUMIN SERPL BCP-MCNC: 4.2 G/DL (ref 3.5–5)
ALP SERPL-CCNC: 44 U/L (ref 34–104)
ALT SERPL W P-5'-P-CCNC: 38 U/L (ref 7–52)
ANION GAP SERPL CALCULATED.3IONS-SCNC: 11 MMOL/L
AST SERPL W P-5'-P-CCNC: 37 U/L (ref 13–39)
BILIRUB SERPL-MCNC: 1.23 MG/DL (ref 0.2–1)
BUN SERPL-MCNC: 12 MG/DL (ref 5–25)
CALCIUM SERPL-MCNC: 8.3 MG/DL (ref 8.4–10.2)
CHLORIDE SERPL-SCNC: 100 MMOL/L (ref 96–108)
CO2 SERPL-SCNC: 26 MMOL/L (ref 21–32)
CREAT SERPL-MCNC: 1.1 MG/DL (ref 0.6–1.3)
ERYTHROCYTE [DISTWIDTH] IN BLOOD BY AUTOMATED COUNT: 12.8 % (ref 11.6–15.1)
GFR SERPL CREATININE-BSD FRML MDRD: 88 ML/MIN/1.73SQ M
GLUCOSE SERPL-MCNC: 84 MG/DL (ref 65–140)
HCT VFR BLD AUTO: 37.2 % (ref 36.5–49.3)
HGB BLD-MCNC: 13 G/DL (ref 12–17)
MAGNESIUM SERPL-MCNC: 1.9 MG/DL (ref 1.9–2.7)
MCH RBC QN AUTO: 31.2 PG (ref 26.8–34.3)
MCHC RBC AUTO-ENTMCNC: 34.9 G/DL (ref 31.4–37.4)
MCV RBC AUTO: 89 FL (ref 82–98)
PHOSPHATE SERPL-MCNC: 3.2 MG/DL (ref 2.7–4.5)
PLATELET # BLD AUTO: 243 THOUSANDS/UL (ref 149–390)
PMV BLD AUTO: 8.3 FL (ref 8.9–12.7)
POTASSIUM SERPL-SCNC: 3.4 MMOL/L (ref 3.5–5.3)
PROT SERPL-MCNC: 6.4 G/DL (ref 6.4–8.4)
RBC # BLD AUTO: 4.17 MILLION/UL (ref 3.88–5.62)
SODIUM SERPL-SCNC: 137 MMOL/L (ref 135–147)
WBC # BLD AUTO: 5.69 THOUSAND/UL (ref 4.31–10.16)

## 2023-12-08 PROCEDURE — 84100 ASSAY OF PHOSPHORUS: CPT | Performed by: NURSE PRACTITIONER

## 2023-12-08 PROCEDURE — 99232 SBSQ HOSP IP/OBS MODERATE 35: CPT | Performed by: FAMILY MEDICINE

## 2023-12-08 PROCEDURE — 80053 COMPREHEN METABOLIC PANEL: CPT | Performed by: NURSE PRACTITIONER

## 2023-12-08 PROCEDURE — 83735 ASSAY OF MAGNESIUM: CPT | Performed by: NURSE PRACTITIONER

## 2023-12-08 PROCEDURE — 85027 COMPLETE CBC AUTOMATED: CPT | Performed by: NURSE PRACTITIONER

## 2023-12-08 RX ORDER — LANOLIN ALCOHOL/MO/W.PET/CERES
6 CREAM (GRAM) TOPICAL
Status: DISCONTINUED | OUTPATIENT
Start: 2023-12-08 | End: 2023-12-09 | Stop reason: HOSPADM

## 2023-12-08 RX ORDER — POTASSIUM CHLORIDE 20 MEQ/1
40 TABLET, EXTENDED RELEASE ORAL ONCE
Status: COMPLETED | OUTPATIENT
Start: 2023-12-08 | End: 2023-12-08

## 2023-12-08 RX ADMIN — CHLORDIAZEPOXIDE HYDROCHLORIDE 25 MG: 25 CAPSULE ORAL at 18:18

## 2023-12-08 RX ADMIN — Medication 1 TABLET: at 09:26

## 2023-12-08 RX ADMIN — BUSPIRONE HYDROCHLORIDE 10 MG: 10 TABLET ORAL at 09:26

## 2023-12-08 RX ADMIN — LORAZEPAM 2 MG: 2 INJECTION INTRAMUSCULAR; INTRAVENOUS at 05:55

## 2023-12-08 RX ADMIN — LORAZEPAM 2 MG: 2 INJECTION INTRAMUSCULAR; INTRAVENOUS at 01:36

## 2023-12-08 RX ADMIN — FOLIC ACID 1 MG: 1 TABLET ORAL at 09:26

## 2023-12-08 RX ADMIN — CHLORDIAZEPOXIDE HYDROCHLORIDE 25 MG: 25 CAPSULE ORAL at 23:36

## 2023-12-08 RX ADMIN — MELATONIN 6 MG: 3 TAB ORAL at 01:40

## 2023-12-08 RX ADMIN — ESCITALOPRAM OXALATE 20 MG: 10 TABLET ORAL at 09:26

## 2023-12-08 RX ADMIN — THIAMINE HCL TAB 100 MG 100 MG: 100 TAB at 09:26

## 2023-12-08 RX ADMIN — SODIUM CHLORIDE 125 ML/HR: 0.9 INJECTION, SOLUTION INTRAVENOUS at 05:48

## 2023-12-08 RX ADMIN — CHLORDIAZEPOXIDE HYDROCHLORIDE 25 MG: 25 CAPSULE ORAL at 05:49

## 2023-12-08 RX ADMIN — TRAZODONE HYDROCHLORIDE 50 MG: 50 TABLET ORAL at 21:34

## 2023-12-08 RX ADMIN — BUSPIRONE HYDROCHLORIDE 10 MG: 10 TABLET ORAL at 21:34

## 2023-12-08 RX ADMIN — NICOTINE 21 MG: 21 PATCH, EXTENDED RELEASE TRANSDERMAL at 09:27

## 2023-12-08 RX ADMIN — SODIUM CHLORIDE 125 ML/HR: 0.9 INJECTION, SOLUTION INTRAVENOUS at 13:44

## 2023-12-08 RX ADMIN — CHLORDIAZEPOXIDE HYDROCHLORIDE 25 MG: 25 CAPSULE ORAL at 01:30

## 2023-12-08 RX ADMIN — SODIUM CHLORIDE 125 ML/HR: 0.9 INJECTION, SOLUTION INTRAVENOUS at 21:41

## 2023-12-08 RX ADMIN — CHLORDIAZEPOXIDE HYDROCHLORIDE 25 MG: 25 CAPSULE ORAL at 13:44

## 2023-12-08 RX ADMIN — BUSPIRONE HYDROCHLORIDE 10 MG: 10 TABLET ORAL at 15:41

## 2023-12-08 RX ADMIN — PANTOPRAZOLE SODIUM 40 MG: 40 TABLET, DELAYED RELEASE ORAL at 05:49

## 2023-12-08 RX ADMIN — POTASSIUM CHLORIDE 40 MEQ: 1500 TABLET, EXTENDED RELEASE ORAL at 09:25

## 2023-12-08 NOTE — H&P
[facility]  H&P  Name: Nataliia Hagen 34 y.o. male I MRN: [id number]  Unit/Bed#: -01 I Date of Admission: 12/7/2023   Date of Service: 12/7/2023 I Hospital Day: 0      Assessment/Plan   * Alcohol use disorder, severe, dependence ([address] [facility])  Assessment & Plan  Presents with 10-year history of alcohol abuse, currently drinking 1/2 handle of vodka daily. Last drink 5 PM on 12/7  History of at least 5 seizures during past episodes of alcohol withdrawal.  Been hospitalized for medical detox multiple times in the past and has attended rehab. Patient and family interested in rehab placement. Case management assistance appreciated  [facility] protocol  Librium taper  Ativan as needed seizure  Seizure precautions  Thiamine, folic acid, MVI supplementation    Nicotine dependence due to vaping tobacco product  Assessment & Plan  Nicotine cessation counseling  Nicotine replacement patch    Anxiety  Assessment & Plan  Continue PTA Lexapro 10 mg daily, BuSpar 10 mg 3 times daily, trazodone 50 mg at bedtime  Outpatient follow-up         VTE Pharmacologic Prophylaxis: VTE Score: 1 Low Risk (Score 0-2) - Encourage Ambulation. Code Status: Level 1 - Full Code   Discussion with family: Updated  (aunt) at bedside. Anticipated Length of Stay: Patient will be admitted on an inpatient basis with an anticipated length of stay of greater than 2 midnights secondary to alcohol withdrawal.    Total Time Spent on Date of Encounter in care of patient: 45 mins. This time was spent on one or more of the following: performing physical exam; counseling and coordination of care; obtaining or reviewing history; documenting in the medical record; reviewing/ordering tests, medications or procedures; communicating with other healthcare professionals and discussing with patient's family/caregivers.     Chief Complaint: Alcohol    History of Present Illness:  Nataliia Hagen is a 34 y.o. male with a PMH of anxiety depression, alcohol abuse, pancreatitis, alcohol withdrawal seizures who presents with his aunt and friend requesting detox. Previously has undergone medical detox and alcohol rehab. Agreeable to inpatient detox and rehab placement but unfortunately [address]  is not accepting patients to the medical detox unit tonight so patient presented to the medical service for further evaluation and treatment. CIWA 13 time of medical admission. Review of Systems:  Review of Systems   Constitutional:  Negative for chills and fever. HENT:  Negative for ear pain and sore throat. Eyes:  Negative for pain and visual disturbance. Respiratory:  Negative for cough and shortness of breath. Cardiovascular:  Negative for chest pain and palpitations. Gastrointestinal:  Negative for abdominal pain and vomiting. Genitourinary:  Negative for dysuria and hematuria. Musculoskeletal:  Negative for arthralgias and back pain. Skin:  Negative for color change and rash. Neurological:  Negative for seizures and syncope. All other systems reviewed and are negative. Past Medical and Surgical History:   Past Medical History:   Diagnosis Date    Alcohol withdrawal seizure ([address])     Anxiety     Depression     High triglycerides     Pancreatitis        History reviewed. No pertinent surgical history. Meds/Allergies:  Prior to Admission medications    Medication Sig Start Date End Date Taking?  Authorizing Provider   busPIRone (BUSPAR) 30 MG tablet Take 10 mg by mouth 3 (three) times a day   Yes Historical Provider, MD   escitalopram (Lexapro) 20 mg tablet Take 1 tablet (20 mg total) by mouth daily 10/19/21  Yes Christina Pae, DO   traZODone (DESYREL) 50 mg tablet Take 50 mg by mouth daily at bedtime   Yes Historical Provider, MD   naltrexone (REVIA) 50 mg tablet Take 1 tablet (50 mg total) by mouth daily 10/19/21   Christina Pae, DO   topiramate (TOPAMAX) 25 mg tablet Take 1 tablet (25 mg total) by mouth daily at bedtime 10/18/21   Marty Salazar, DO     I have reviewed home medications with patient personally. Allergies: No Known Allergies    Social History:  Marital Status: Single   Occupation: investment   Patient Pre-hospital Living Situation: Home  Patient Pre-hospital Level of Mobility: walks  Patient Pre-hospital Diet Restrictions: none  Substance Use History:   Social History     Substance and Sexual Activity   Alcohol Use Yes    Alcohol/week: 17.0 standard drinks of alcohol    Types: 16 Shots of liquor per week     Social History     Tobacco Use   Smoking Status Never   Smokeless Tobacco Never     Social History     Substance and Sexual Activity   Drug Use Never       Family History:  Family History   Problem Relation Age of Onset    Heart disease Mother     Diabetes Mother        Physical Exam:     Vitals:   Blood Pressure: 127/73 (12/07/23 2223)  Pulse: 97 (12/07/23 2223)  Temperature: 97.5 °F (36.4 °C) (12/07/23 2223)  Temp Source: Temporal (12/07/23 2030)  Respirations: 20 (12/07/23 2030)  Height: 5' 11" (180.3 cm) (12/07/23 2028)  Weight - Scale: 86.1 kg (189 lb 12.8 oz) (12/07/23 2028)  SpO2: 94 % (12/07/23 2223)    Physical Exam  Vitals and nursing note reviewed. Constitutional:       General: He is not in acute distress. Appearance: He is well-developed. HENT:      Head: Normocephalic and atraumatic. Mouth/Throat:      Mouth: Mucous membranes are dry. Eyes:      Conjunctiva/sclera: Conjunctivae normal.   Cardiovascular:      Rate and Rhythm: Regular rhythm. Tachycardia present. Heart sounds: No murmur heard. Pulmonary:      Effort: Pulmonary effort is normal. No respiratory distress. Breath sounds: Normal breath sounds. Abdominal:      Palpations: Abdomen is soft. Tenderness: There is no abdominal tenderness. Musculoskeletal:         General: No swelling. Cervical back: Neck supple. Skin:     General: Skin is warm and dry.       Capillary Refill: Capillary refill takes less than 2 seconds. Neurological:      General: No focal deficit present. Mental Status: He is alert and oriented to person, place, and time. Psychiatric:         Mood and Affect: Mood normal.         Behavior: Behavior normal.       Additional Data:     Lab Results:  Results from last 7 days   Lab Units 12/07/23  1816   WBC Thousand/uL 6.00   HEMOGLOBIN g/dL 15.4   HEMATOCRIT % 44.4   PLATELETS Thousands/uL 300   NEUTROS PCT % 57   LYMPHS PCT % 38   MONOS PCT % 4   EOS PCT % 0     Results from last 7 days   Lab Units 12/07/23  1816   SODIUM mmol/L 136   POTASSIUM mmol/L 3.5   CHLORIDE mmol/L 96   CO2 mmol/L 18*   BUN mg/dL 13   CREATININE mg/dL 1.03   ANION GAP mmol/L 22   CALCIUM mg/dL 8.7   ALBUMIN g/dL 4.9   TOTAL BILIRUBIN mg/dL 0.97   ALK PHOS U/L 54   ALT U/L 40   AST U/L 47*   GLUCOSE RANDOM mg/dL 69                       Lines/Drains:  Invasive Devices       Peripheral Intravenous Line  Duration             Peripheral IV 12/07/23 Left Forearm <1 day                        Imaging: No pertinent imaging reviewed. No orders to display       EKG and Other Studies Reviewed on Admission:   pending    ** Please Note: This note has been constructed using a voice recognition system.  **

## 2023-12-08 NOTE — UTILIZATION REVIEW
Initial Clinical Review    Admission: Date/Time/Statement:   Admission Orders (From admission, onward)       Ordered        12/07/23 1939  INPATIENT ADMISSION  Once                          Orders Placed This Encounter   Procedures    INPATIENT ADMISSION     Standing Status:   Standing     Number of Occurrences:   1     Order Specific Question:   Level of Care     Answer:   Med Surg [16]     Order Specific Question:   Estimated length of stay     Answer:   More than 2 Midnights     Order Specific Question:   Certification     Answer:   I certify that inpatient services are medically necessary for this patient for a duration of greater than two midnights. See H&P and MD Progress Notes for additional information about the patient's course of treatment. ED Arrival Information       Expected   -    Arrival   12/7/2023 17:48    Acuity   Emergent              Means of arrival   Walk-In    Escorted by   Family Member    Service   Hospitalist    Admission type   Emergency              Arrival complaint   alcohol intoxicaton             Chief Complaint   Patient presents with    Alcohol Intoxication     Pt presented to this ED with friend and family member stating on a drinking binge for past 5 days drinking 1/3 to 1/2 a handle vodka with history of seizures during withdrawal. Last drink at 1700 today. Pt c/o abdominal pain at this time. Pt agreeable to detox. Initial Presentation: 34 y.o. male to ED via walk-in from home  Present to ED with with his aunt and friend requesting detox. Previously has undergone medical detox and alcohol rehab. Agreeable to inpatient detox and rehab placement . Presents with 10-year history of alcohol abuse, currently drinking 1/2 handle of vodka daily. Last drink 5 PM on 12/7. History of at least 5 seizures during past episodes of alcohol withdrawal.  Been hospitalized for medical detox multiple times in the past and has attended rehab.   Patient and family interested in rehab placement. Case management assistance appreciated. PMHX anxiety depression, alcohol abuse, pancreatitis, alcohol withdrawal seizures    Admitted to [address] [facility] with DX: Alcohol use disorder, severe, dependence   on exam: CIWA 13; tachy; anxious; diaphoretic  PLAN: CIWA; librium taper; cont ivf; rec'd ativan iv x1;  rec'd protonix iv x1; seizure precautions; Thiamine, folic acid, MVI supplementation; continuous pulse ox      Date: 12/8/23      Day 2    Plan: CIWA; librium taper; cont ivf; rec'd ativan iv x2; seizure precautions;  Thiamine, folic acid, MVI supplementation ; continuous pulse ox    ED Triage Vitals [12/07/23 1805]   Temperature Pulse Respirations Blood Pressure SpO2   98.2 °F (36.8 °C) 94 18 128/79 92 %      Temp Source Heart Rate Source Patient Position - Orthostatic VS BP Location FiO2 (%)   Tympanic Monitor Lying Left arm --      Pain Score       5          Wt Readings from Last 1 Encounters:   12/07/23 86.1 kg (189 lb 12.8 oz)     Additional Vital Signs:   Date/Time Temp Pulse Resp BP MAP (mmHg) SpO2 O2 Device Patient Position - Orthostatic VS   12/08/23 09:25:05 97.5 °F (36.4 °C) 85 19 134/85 101 97 % -- --   12/08/23 05:44:36 96.8 °F (36 °C) Abnormal  77 -- 118/73 88 98 % -- --   12/08/23 0530 -- 90 -- 118/73 -- -- -- --   12/08/23 01:31:02 97.5 °F (36.4 °C) 87 -- 123/83 96 98 % -- --   12/08/23 0130 -- 89 -- 123/83 -- -- -- --   12/07/23 22:23:04 97.5 °F (36.4 °C) 97 -- 127/73 91 94 % -- --   12/07/23 2130 -- 93 -- 127/73 -- -- -- --   12/07/23 2030 97.3 °F (36.3 °C) Abnormal  96 20 118/84 95 95 % None (Room air) Lying   12/07/23 20:28:33 97.3 °F (36.3 °C) Abnormal  97 20 118/84 95 95 % None (Room air) Lying   12/07/23 2000 -- 104 18 112/77 91 94 % None (Room air) Lying   12/07/23 1945 -- 93 18 135/78 -- 92 % None (Room air) Lying   12/07/23 1900 -- 105 18 125/86 96 92 % None (Room air) Lying   12/07/23 1815 -- 92 16 123/78 95 92 % None (Room air) Lying   12/07/23 1807 -- -- -- -- -- -- None (Room air) --   12/07/23 1805 98.2 °F (36.8 °C) 94 18 128/79 96 92 % None (Room air) Lying         EKG: none obtained      Results from last 7 days   Lab Units 12/08/23  0522 12/07/23  1816   WBC Thousand/uL 5.69 6.00   HEMOGLOBIN g/dL 13.0 15.4   HEMATOCRIT % 37.2 44.4   PLATELETS Thousands/uL 243 300   NEUTROS ABS Thousands/µL  --  3.37        Results from last 7 days   Lab Units 12/08/23  0522 12/07/23  1816   SODIUM mmol/L 137 136   POTASSIUM mmol/L 3.4* 3.5   CHLORIDE mmol/L 100 96   CO2 mmol/L 26 18*   ANION GAP mmol/L 11 22   BUN mg/dL 12 13   CREATININE mg/dL 1.10 1.03   EGFR ml/min/1.73sq m 88 96   CALCIUM mg/dL 8.3* 8.7   MAGNESIUM mg/dL 1.9 2.0   PHOSPHORUS mg/dL 3.2  --      Results from last 7 days   Lab Units 12/08/23  0522 12/07/23  1816   AST U/L 37 47*   ALT U/L 38 40   ALK PHOS U/L 44 54   TOTAL PROTEIN g/dL 6.4 7.6   ALBUMIN g/dL 4.2 4.9   TOTAL BILIRUBIN mg/dL 1.23* 0.97        Results from last 7 days   Lab Units 12/08/23  0522 12/07/23  1816   GLUCOSE RANDOM mg/dL 84 69        Results from last 7 days   Lab Units 12/07/23  1816   LIPASE u/L 23        Results from last 7 days   Lab Units 12/07/23  1934   AMPH/METH  Negative   BARBITURATE UR  Negative   BENZODIAZEPINE UR  Negative   COCAINE UR  Negative   METHADONE URINE  Negative   OPIATE UR  Negative   PCP UR  Negative   THC UR  Negative     Results from last 7 days   Lab Units 12/07/23  1816   ETHANOL LVL mg/dL 298*          ED Treatment:   Medication Administration from 12/07/2023 1745 to 12/07/2023 2022         Date/Time Order Dose Route Action     12/07/2023 1816 EST lactated ringers bolus 1,000 mL 1,000 mL Intravenous New Bag     12/07/2023 1816 EST ondansetron (ZOFRAN) injection 4 mg 4 mg Intravenous Given     12/07/2023 1855 EST thiamine (VITAMIN B1) 100 mg in sodium chloride 0.9 % 50 mL IVPB 100 mg Intravenous New Bag     15/73/5906 3803 EST folic acid 1 mg in sodium chloride 0.9 % 50 mL IVPB 1 mg Intravenous New Bag            Present on Admission:   Alcohol use disorder, severe, dependence (HCC)   Anxiety      Admitting Diagnosis: Alcohol intoxication ([address] [facility]) [L12.218]  Alcoholic intoxication with complication ([address] [facility]) [R35.316]  Alcohol use disorder, severe, dependence ([address] [facility]) [F10.20]    Age/Sex: 34 y.o. male    Admission Orders: SCDs; CIWA; regular diet    Scheduled Medications:  busPIRone, 10 mg, Oral, TID  chlordiazePOXIDE, 25 mg, Oral, Q6H BRANDIN  escitalopram, 20 mg, Oral, Daily  folic acid, 1 mg, Oral, Daily  multivitamin-minerals, 1 tablet, Oral, Daily  nicotine, 21 mg, Transdermal, Daily  pantoprazole, 40 mg, Oral, Early Morning  thiamine, 100 mg, Oral, Daily  traZODone, 50 mg, Oral, HS    LORazepam (ATIVAN) injection 2 mg  Dose: 2 mg  Freq: Once Route: IV  Start: 12/07/23 2100 End: 12/07/23 2105       pantoprazole (PROTONIX) injection 40 mg  Dose: 40 mg  Freq: Once Route: IV  Start: 12/07/23 2045 End: 12/07/23 2107       Continuous IV Infusions:  sodium chloride, 125 mL/hr, Intravenous, Continuous      PRN Meds:  acetaminophen, 650 mg, Oral, Q6H PRN  LORazepam, 2 mg, Intravenous, Q4H PRN  (12/7 rec'd x1)  (12/8 rec'd x2 so far today)   melatonin, 6 mg, Oral, HS PRN  ondansetron, 4 mg, Intravenous, Q6H PRN        IP CONSULT TO ED CRISIS WORKER  IP CONSULT TO CASE MANAGEMENT    Network Utilization Review Department  ATTENTION: Please call with any questions or concerns to [phone] and carefully listen to the prompts so that you are directed to the right person. All voicemails are confidential.   For Discharge needs, contact Care Management DC Support Team at [phone] opt. [phone]  Send all requests for admission clinical reviews, approved or denied determinations and any other requests to dedicated fax number below belonging to the campus where the patient is receiving treatment.  List of dedicated fax numbers for the Facilities:  FACILITY NAME UR FAX NUMBER   ADMISSION DENIALS (Administrative/Medical Necessity) [phone]   DISCHARGE SUPPORT TEAM (NETWORK) [address] [facility] (Maternity/NICU/Pediatrics) [address] [place] [address] [facility] [phone] [address] [address] [facility] [address] [facility] [place] [address] [phone]

## 2023-12-08 NOTE — PROGRESS NOTES
[address] [facility]  Progress Note  Name: García Olivo  MRN: [id number]  Unit/Bed#: -01 I Date of Admission: 12/7/2023   Date of Service: 12/8/2023 I Hospital Day: 1    Assessment/Plan   * Alcohol use disorder, severe, dependence ([address] [facility])  Assessment & Plan  Presents with 10-year history of alcohol abuse, currently drinking 1/2 handle of vodka daily. Last drink 5 PM on 12/7  History of at least 5 seizures during past episodes of alcohol withdrawal.  Been hospitalized for medical detox multiple times in the past and has attended rehab. Patient and family interested in rehab placement. Case management assistance appreciated  [facility] protocol  Librium taper  Ativan as needed seizure  Seizure precautions  Thiamine, folic acid, MVI supplementation  Discussed with patient on bedside regarding treatment and management plan including rehab versus detox facility. Patient at this time is not interested due to his work. Outpatient resources was provided. Nicotine dependence due to vaping tobacco product  Assessment & Plan  Nicotine cessation counseling  Nicotine replacement patch    Anxiety  Assessment & Plan  Continue PTA Lexapro 10 mg daily, BuSpar 10 mg 3 times daily, trazodone 50 mg at bedtime  Outpatient follow-up             VTE Pharmacologic Prophylaxis: VTE Score: 1 Low Risk (Score 0-2) - Encourage Ambulation. Mobility:   Basic Mobility Inpatient Raw Score: 22  JH-HLM Goal: 7: Walk 25 feet or more  JH-HLM Achieved: 7: Walk 25 feet or more  HLM Goal achieved. Continue to encourage appropriate mobility. Patient Centered Rounds: I performed bedside rounds with nursing staff today. Discussions with Specialists or Other Care Team Provider: None    Education and Discussions with Family / Patient:  with patient.      Current Length of Stay: 1 day(s)  Current Patient Status: Inpatient   Certification Statement: The patient will continue to require additional inpatient hospital stay due to to monitor above conditions  Discharge Plan: Anticipate discharge in 24-48 hrs to home. Code Status: Level 1 - Full Code    Subjective:   Seen and evaluated during the event. Patient is alert and oriented, denies any hallucination, headache, nausea, vomiting, runny nose, tremor, anxiety, Eliquis pulm. Denies any diarrhea. Objective:     Vitals:   Temp (24hrs), Av.4 °F (36.3 °C), Min:96.8 °F (36 °C), Max:98.2 °F (36.8 °C)    Temp:  [96.8 °F (36 °C)-98.2 °F (36.8 °C)] 97.3 °F (36.3 °C)  HR:  [] 84  Resp:  [16-20] 18  BP: (112-135)/(73-86) 130/84  SpO2:  [92 %-98 %] 97 %  Body mass index is 26.47 kg/m². Input and Output Summary (last 24 hours): Intake/Output Summary (Last 24 hours) at 2023 1655  Last data filed at 2023 1255  Gross per 24 hour   Intake 2427.5 ml   Output 950 ml   Net 1477.5 ml       Physical Exam:   Physical Exam  Vitals and nursing note reviewed. Constitutional:       Appearance: Normal appearance. He is not ill-appearing or diaphoretic. HENT:      Nose: Nose normal.   Eyes:      General: No scleral icterus. Left eye: No discharge. Extraocular Movements: Extraocular movements intact. Conjunctiva/sclera: Conjunctivae normal.      Pupils: Pupils are equal, round, and reactive to light. Cardiovascular:      Rate and Rhythm: Normal rate. Heart sounds: No murmur heard. No friction rub. No gallop. Pulmonary:      Effort: Pulmonary effort is normal. No respiratory distress. Breath sounds: No stridor. No wheezing or rhonchi. Abdominal:      General: Abdomen is flat. Bowel sounds are normal. There is no distension. Palpations: There is no mass. Tenderness: There is no abdominal tenderness. Hernia: No hernia is present. Skin:     General: Skin is warm. Capillary Refill: Capillary refill takes less than 2 seconds. Coloration: Skin is not jaundiced or pale. Findings: No bruising or erythema. Neurological:      General: No focal deficit present. Mental Status: He is alert and oriented to person, place, and time. Cranial Nerves: No cranial nerve deficit. Sensory: No sensory deficit. Motor: No weakness. Coordination: Coordination normal.          Additional Data:     Labs:  Results from last 7 days   Lab Units 12/08/23  0522 12/07/23  1816   WBC Thousand/uL 5.69 6.00   HEMOGLOBIN g/dL 13.0 15.4   HEMATOCRIT % 37.2 44.4   PLATELETS Thousands/uL 243 300   NEUTROS PCT %  --  57   LYMPHS PCT %  --  38   MONOS PCT %  --  4   EOS PCT %  --  0     Results from last 7 days   Lab Units 12/08/23  0522   SODIUM mmol/L 137   POTASSIUM mmol/L 3.4*   CHLORIDE mmol/L 100   CO2 mmol/L 26   BUN mg/dL 12   CREATININE mg/dL 1.10   ANION GAP mmol/L 11   CALCIUM mg/dL 8.3*   ALBUMIN g/dL 4.2   TOTAL BILIRUBIN mg/dL 1.23*   ALK PHOS U/L 44   ALT U/L 38   AST U/L 37   GLUCOSE RANDOM mg/dL 84                       Lines/Drains:  Invasive Devices       Peripheral Intravenous Line  Duration             Peripheral IV 12/07/23 Left Forearm <1 day                          Imaging: No pertinent imaging reviewed.     Recent Cultures (last 7 days):         Last 24 Hours Medication List:   Current Facility-Administered Medications   Medication Dose Route Frequency Provider Last Rate    acetaminophen  650 mg Oral Q6H PRN Humaira S Dimitrios, CRNP      busPIRone  10 mg Oral TID Humaira S Dimitrios, CRNP      chlordiazePOXIDE  25 mg Oral Q6H [address] Humaira S Dimitrios, CRNP      escitalopram  20 mg Oral Daily Humaira S Dimitrios, CRNP      folic acid  1 mg Oral Daily Humaira S Dimitrios, CRNP      LORazepam  2 mg Intravenous Q4H PRN Humaira S Dimitrios, CRNP      melatonin  6 mg Oral HS PRN Humaira S Dimitrios, CRNP      multivitamin-minerals  1 tablet Oral Daily Narinder Rodríguez, DO      nicotine  21 mg Transdermal Daily Humaira S Dimitrios, CRNP      ondansetron  4 mg Intravenous Q6H PRN Humaira S Dimitrios, CRNP      pantoprazole  40 mg Oral Early Morning Humaira S Dimitrios, CRNP      sodium chloride  125 mL/hr Intravenous Continuous Humaira S Dimitrios, CRNP 125 mL/hr (12/08/23 1344)    thiamine  100 mg Oral Daily Uhmaira S Dimitrios, CRNP      traZODone  50 mg Oral HS Humaira S Dimitrios, CRNP          Today, Patient Was Seen By: Natasha Hernandez MD    **Please Note: This note may have been constructed using a voice recognition system. **

## 2023-12-08 NOTE — ED NOTES
Crisis emailed requested D&A resources to pt's nurse [facility].

## 2023-12-08 NOTE — ASSESSMENT & PLAN NOTE
Presents with 10-year history of alcohol abuse, currently drinking 1/2 handle of vodka daily. Last drink 5 PM on 12/7  History of at least 5 seizures during past episodes of alcohol withdrawal.  Been hospitalized for medical detox multiple times in the past and has attended rehab. Patient and family interested in rehab placement. Case management assistance appreciated  [facility] protocol  Librium taper  Ativan as needed seizure  Seizure precautions  Thiamine, folic acid, MVI supplementation  Discussed with patient on bedside regarding treatment and management plan including rehab versus detox facility. Patient at this time is not interested due to his work. Outpatient resources was provided.

## 2023-12-08 NOTE — PLAN OF CARE
Problem: Potential for Falls  Goal: Patient will remain free of falls  Description: INTERVENTIONS:  - Educate patient/family on patient safety including physical limitations  - Instruct patient to call for assistance with activity   - Consult OT/PT to assist with strengthening/mobility   - Keep Call bell within reach  - Keep bed low and locked with side rails adjusted as appropriate  - Keep care items and personal belongings within reach  - Initiate and maintain comfort rounds  - Make Fall Risk Sign visible to staff  - Offer Toileting every 4 Hours, in advance of need  - Initiate/Maintain bed/chair alarm  - Obtain necessary fall risk management equipment  - Apply yellow socks and bracelet for high fall risk patients  - Consider moving patient to room near nurses station  Outcome: Progressing     Problem: PAIN - ADULT  Goal: Verbalizes/displays adequate comfort level or baseline comfort level  Description: Interventions:  - Encourage patient to monitor pain and request assistance  - Assess pain using appropriate pain scale  - Administer analgesics based on type and severity of pain and evaluate response  - Implement non-pharmacological measures as appropriate and evaluate response  - Consider cultural and social influences on pain and pain management  - Notify physician/advanced practitioner if interventions unsuccessful or patient reports new pain  Outcome: Progressing     Problem: SAFETY ADULT  Goal: Patient will remain free of falls  Description: INTERVENTIONS:  - Educate patient/family on patient safety including physical limitations  - Instruct patient to call for assistance with activity   - Consult OT/PT to assist with strengthening/mobility   - Keep Call bell within reach  - Keep bed low and locked with side rails adjusted as appropriate  - Keep care items and personal belongings within reach  - Initiate and maintain comfort rounds  - Make Fall Risk Sign visible to staff  - Obtain necessary fall risk management equipment  - Apply yellow socks and bracelet for high fall risk patients  - Consider moving patient to room near nurses station  Outcome: Progressing  Goal: Maintain or return to baseline ADL function  Description: INTERVENTIONS:  -  Assess patient's ability to carry out ADLs; assess patient's baseline for ADL function and identify physical deficits which impact ability to perform ADLs (bathing, care of mouth/teeth, toileting, grooming, dressing, etc.)  - Assess/evaluate cause of self-care deficits   - Assess range of motion  - Assess patient's mobility; develop plan if impaired  - Assess patient's need for assistive devices and provide as appropriate  - Encourage maximum independence but intervene and supervise when necessary  - Involve family in performance of ADLs  - Assess for home care needs following discharge   - Consider OT consult to assist with ADL evaluation and planning for discharge  - Provide patient education as appropriate  Outcome: Progressing  Goal: Maintains/Returns to pre admission functional level  Description: INTERVENTIONS:  - Perform AM-PAC 6 Click Basic Mobility/ Daily Activity assessment daily.  - Set and communicate daily mobility goal to care team and patient/family/caregiver.    - Collaborate with rehabilitation services on mobility goals if consulted  - Out of bed for toileting  - Record patient progress and toleration of activity level   Outcome: Progressing     Problem: DISCHARGE PLANNING  Goal: Discharge to home or other facility with appropriate resources  Description: INTERVENTIONS:  - Identify barriers to discharge w/patient and caregiver  - Arrange for needed discharge resources and transportation as appropriate  - Identify discharge learning needs (meds, wound care, etc.)  - Arrange for interpretive services to assist at discharge as needed  - Refer to Case Management Department for coordinating discharge planning if the patient needs post-hospital services based on physician/advanced practitioner order or complex needs related to functional status, cognitive ability, or social support system  Outcome: Progressing     Problem: Knowledge Deficit  Goal: Patient/family/caregiver demonstrates understanding of disease process, treatment plan, medications, and discharge instructions  Description: Complete learning assessment and assess knowledge base. Interventions:  - Provide teaching at level of understanding  - Provide teaching via preferred learning methods  Outcome: Progressing     Problem: NEUROSENSORY - ADULT  Goal: Achieves stable or improved neurological status  Description: INTERVENTIONS  - Monitor and report changes in neurological status  - Monitor vital signs such as temperature, blood pressure, glucose, and any other labs ordered   - Initiate measures to prevent increased intracranial pressure  - Monitor for seizure activity and implement precautions if appropriate      Outcome: Progressing  Goal: Remains free of injury related to seizures activity  Description: INTERVENTIONS  - Maintain airway, patient safety  and administer oxygen as ordered  - Monitor patient for seizure activity, document and report duration and description of seizure to physician/advanced practitioner  - If seizure occurs,  ensure patient safety during seizure  - Reorient patient post seizure  - Seizure pads on all 4 side rails  - Instruct patient/family to notify RN of any seizure activity including if an aura is experienced  - Instruct patient/family to call for assistance with activity based on nursing assessment  - Administer anti-seizure medications if ordered    Outcome: Progressing  Goal: Achieves maximal functionality and self care  Description: INTERVENTIONS  - Monitor swallowing and airway patency with patient fatigue and changes in neurological status  - Encourage and assist patient to increase activity and self care.    - Encourage visually impaired, hearing impaired and aphasic patients to use assistive/communication devices  Outcome: Progressing     Problem: GASTROINTESTINAL - ADULT  Goal: Minimal or absence of nausea and/or vomiting  Description: INTERVENTIONS:  - Administer IV fluids if ordered to ensure adequate hydration  - Maintain NPO status until nausea and vomiting are resolved  - Nasogastric tube if ordered  - Administer ordered antiemetic medications as needed  - Provide nonpharmacologic comfort measures as appropriate  - Advance diet as tolerated, if ordered  - Consider nutrition services referral to assist patient with adequate nutrition and appropriate food choices  Outcome: Progressing  Goal: Maintains or returns to baseline bowel function  Description: INTERVENTIONS:  - Assess bowel function  - Encourage oral fluids to ensure adequate hydration  - Administer IV fluids if ordered to ensure adequate hydration  - Administer ordered medications as needed  - Encourage mobilization and activity  - Consider nutritional services referral to assist patient with adequate nutrition and appropriate food choices  Outcome: Progressing  Goal: Maintains adequate nutritional intake  Description: INTERVENTIONS:  - Monitor percentage of each meal consumed  - Identify factors contributing to decreased intake, treat as appropriate  - Assist with meals as needed  - Monitor I&O, weight, and lab values if indicated  - Obtain nutrition services referral as needed  Outcome: Progressing  Goal: Establish and maintain optimal ostomy function  Description: INTERVENTIONS:  - Assess bowel function  - Encourage oral fluids to ensure adequate hydration  - Administer IV fluids if ordered to ensure adequate hydration   - Administer ordered medications as needed  - Encourage mobilization and activity  - Nutrition services referral to assist patient with appropriate food choices  - Assess stoma site  - Consider wound care consult   Outcome: Progressing  Goal: Oral mucous membranes remain intact  Description: INTERVENTIONS  - Assess oral mucosa and hygiene practices  - Implement preventative oral hygiene regimen  - Implement oral medicated treatments as ordered  - Initiate Nutrition services referral as needed  Outcome: Progressing     Problem: METABOLIC, FLUID AND ELECTROLYTES - ADULT  Goal: Electrolytes maintained within normal limits  Description: INTERVENTIONS:  - Monitor labs and assess patient for signs and symptoms of electrolyte imbalances  - Administer electrolyte replacement as ordered  - Monitor response to electrolyte replacements, including repeat lab results as appropriate  - Instruct patient on fluid and nutrition as appropriate  Outcome: Progressing  Goal: Fluid balance maintained  Description: INTERVENTIONS:  - Monitor labs   - Monitor I/O and WT  - Instruct patient on fluid and nutrition as appropriate  - Assess for signs & symptoms of volume excess or deficit  Outcome: Progressing  Goal: Glucose maintained within target range  Description: INTERVENTIONS:  - Monitor Blood Glucose as ordered  - Assess for signs and symptoms of hyperglycemia and hypoglycemia  - Administer ordered medications to maintain glucose within target range  - Assess nutritional intake and initiate nutrition service referral as needed  Outcome: Progressing

## 2023-12-08 NOTE — CASE MANAGEMENT
Case Management Assessment & Discharge Planning Note    Patient name Franklin Matson  Location [address] [facility] [place] 333/-[id number] MRN [id number]  : 1990 Date 2023       Current Admission Date: 2023  Current Admission Diagnosis:Alcohol use disorder, severe, dependence ([address])   Patient Active Problem List    Diagnosis Date Noted    Nicotine dependence due to vaping tobacco product 2023    Hypokalemia 10/17/2021    Alcohol use disorder, severe, dependence ([address]) 10/16/2021    Alcohol withdrawal syndrome without complication ([address]) 6804    COVID-19 10/16/2021    Anxiety 10/16/2021    Tobacco abuse 10/16/2021      LOS (days): 1  Geometric Mean LOS (GMLOS) (days):   Days to GMLOS:     OBJECTIVE:    Risk of Unplanned Readmission Score: 10.74         Current admission status: Inpatient  Referral Reason:  (Drug/Etoh/MH)    Preferred Pharmacy:   PATIENT/FAMILY REPORTS NO PREFERRED PHARMACY  No address on file      [facility]/pharmacy #[id number]- [facility], [address] [place] [address]  Phone: [phone] Fax: [phone]    Primary Care Provider: No primary care provider on file. Primary Insurance: [employer]  Secondary Insurance:     ASSESSMENT:  Emilie Proxies    There are no active Health Care Proxies on file.        Advance Directives  Does patient have a [address]?: No  Was patient offered paperwork?: Yes (pt declined)  Does patient currently have a Health Care decision maker?: Yes, please see Health Care Proxy section  Does patient have Advance Directives?: No  Was patient offered paperwork?: Yes (pt declined)  Primary Contact: Sohail Shoulders, father         Readmission Root Cause  30 Day Readmission: No    Patient Information  Admitted from[de-identified] Home  Mental Status: Alert  During Assessment patient was accompanied by: Not accompanied during assessment  Assessment information provided by[de-identified] Patient  Primary Caregiver: Self  Support Systems: Parent, Family members, [address] [facility] of Residence: [facility] do you live in?: [address] access options. Select all that apply.: Elevator  Type of Current Residence: Apartment  Upon entering residence, is there a bedroom on the main floor (no further steps)?: Yes  Upon entering residence, is there a bathroom on the main floor (no further steps)?: Yes  Living Arrangements: Lives w/ Extended Family  Is patient a ?: No    Activities of Daily Living Prior to Admission  Functional Status: Independent  Completes ADLs independently?: Yes  Ambulates independently?: Yes  Does patient use assisted devices?: No  Does patient currently own DME?: No  Does patient have a history of Outpatient Therapy (PT/OT)?: No  Does the patient have a history of Short-Term Rehab?: No  Does patient have a history of HHC?: No  Does patient currently have [facility]?: No         Patient Information Continued  Income Source: Employed  Does patient have prescription coverage?: Yes  Does patient receive dialysis treatments?: No  Does patient have a history of substance abuse?: Yes  Historical substance use preference: Alcohol/ETOH  History of Withdrawal Symptoms: Seizures  Is patient currently in treatment for substance abuse?: No. Treatment options provided  Does patient have a history of Mental Health Diagnosis?: Yes (anxiety and depression)  Is patient receiving treatment for mental health?: No. Treatment options were provided.   Has patient received inpatient treatment related to mental health in the last 2 years?: No         Means of Transportation  Means of Transport to [facility][de-identified] [address]: Low Risk  (12/8/2023)    Housing Stability Vital Sign     Unable to Pay for Housing in the Last Year: No     Number of Places Lived in the Last Year: 1     Unstable Housing in the Last Year: No   Food Insecurity: No Food Insecurity (12/8/2023)    Hunger Vital Sign     Worried About Running Out of Food in the Last Year: Never true     [address] in the Last Year: Never true   Transportation Needs: No Transportation Needs (12/8/2023)    PRAPARE - Transportation     Lack of Transportation (Medical): No     Lack of Transportation (Non-Medical): No   Utilities: Not At Risk (12/8/2023)    [facility] Utilities     Threatened with loss of utilities: No       DISCHARGE DETAILS:    Discharge planning discussed with[de-identified] patient  Freedom of Choice: Yes     CM contacted family/caregiver?: No- see comments (patient declined)                  Requested [address] [facility]         Is the patient interested in [address] at discharge?: No    DME Referral Provided  Referral made for DME?: No    Other Referral/Resources/Interventions Provided:  Interventions: D&A Warm Handoff  Referral Comments: Warm HandOff    Would you like to participate in our [address] [facility] [address] service program?  : No - Declined                CM met with patient at the bedside, baseline information was obtained. CM discussed the role of CM in helping the patient develop a discharge plan and assist the patient in carry out their plan. Patient reports his primary residence, since August 2023, is with his brother at [address]. CM changed address in chart from [facility] address where patient indicated he no longer resides. Patient has been staying with his parents, however, at [address] [facility], [address] [place] [address] as support. Patient admits to alcohol history and mental health history: inpatient rehab at [facility], detox at [facility], and several partial hospitalization programs, IOP programs and outpatient therapist.  CM inquired if patient is agreeable to securing outpatient counseling upon discharge from [address] [place] [address] and patient agreed but indicated he has a very special "niche" he desires in a therapist.  Patient agreeable to speak to [facility] SIMRAN SHERMAN and referral was made by CM to [address] [facility] [address] for assessment. CM to follow for CM discharge referral needs.

## 2023-12-08 NOTE — ASSESSMENT & PLAN NOTE
Continue PTA Lexapro 10 mg daily, BuSpar 10 mg 3 times daily, trazodone 50 mg at bedtime  Outpatient follow-up

## 2023-12-08 NOTE — ED NOTES
Provided linkage to Network-[facility]-Attending on Call tiger text role to attending for pt chart review for potential admit to [facility] detox. Per [facility] attending on call, Dr. Lindsey Mccallum, pt attending must outreach to begin doc to doc review. Attending notified.

## 2023-12-08 NOTE — ASSESSMENT & PLAN NOTE
Presents with 10-year history of alcohol abuse, currently drinking 1/2 handle of vodka daily. Last drink 5 PM on 12/7  History of at least 5 seizures during past episodes of alcohol withdrawal.  Been hospitalized for medical detox multiple times in the past and has attended rehab. Patient and family interested in rehab placement.   Case management assistance appreciated  [facility] protocol  Librium taper  Ativan as needed seizure  Seizure precautions  Thiamine, folic acid, MVI supplementation

## 2023-12-08 NOTE — UTILIZATION REVIEW
NOTIFICATION OF INPATIENT ADMISSION   AUTHORIZATION REQUEST   SERVICING FACILITY:   [address]  Tax ID: [id number]  NPI: [id number] ATTENDING PROVIDER:  Attending Name and NPI#: Cat Remy Md [[id number]]  Address: [address] [place] [address]  Phone: [phone]   ADMISSION INFORMATION:  [facility] of Service: Inpatient [address] [facility]   Place of Service Code: 21  Inpatient Admission Date/Time: 12/7/23  7:39 PM  Discharge Date/Time: No discharge date for patient encounter. Admitting Diagnosis Code/Description:  Alcohol intoxication ([facility]) [Q01.267]  Alcoholic intoxication with complication ([facility]) [H21.003]  Alcohol use disorder, severe, dependence ([facility]) [F10.20]     UTILIZATION REVIEW CONTACT:  Uday Holland Utilization   Network Utilization Review Department  Phone: [phone]  Fax [phone]  Email: [email or web address]  Contact for approvals/pending authorizations, clinical reviews, and discharge. PHYSICIAN ADVISORY SERVICES:  Medical Necessity Denial & Mlew-zk-Ippl Review  Phone: [phone]  Fax: [phone]  Email: [email or web address]     DISCHARGE SUPPORT TEAM:  For Patients Discharge Needs & Updates  Phone: [phone] opt. 2 Fax: [phone]  Email: [email or web address]

## 2023-12-08 NOTE — PLAN OF CARE
Problem: Potential for Falls  Goal: Patient will remain free of falls  Description: INTERVENTIONS:  - Educate patient/family on patient safety including physical limitations  - Instruct patient to call for assistance with activity   - Consult OT/PT to assist with strengthening/mobility   - Keep Call bell within reach  - Keep bed low and locked with side rails adjusted as appropriate  - Keep care items and personal belongings within reach  - Initiate and maintain comfort rounds  - Make Fall Risk Sign visible to staff  - Offer Toileting every 2 Hours, in advance of need  - Initiate/Maintain alarm  - Obtain necessary fall risk management equipment  - Apply yellow socks and bracelet for high fall risk patients  - Consider moving patient to room near nurses station  Outcome: Progressing     Problem: PAIN - ADULT  Goal: Verbalizes/displays adequate comfort level or baseline comfort level  Description: Interventions:  - Encourage patient to monitor pain and request assistance  - Assess pain using appropriate pain scale  - Administer analgesics based on type and severity of pain and evaluate response  - Implement non-pharmacological measures as appropriate and evaluate response  - Consider cultural and social influences on pain and pain management  - Notify physician/advanced practitioner if interventions unsuccessful or patient reports new pain  Outcome: Progressing     Problem: SAFETY ADULT  Goal: Patient will remain free of falls  Description: INTERVENTIONS:  - Educate patient/family on patient safety including physical limitations  - Instruct patient to call for assistance with activity   - Consult OT/PT to assist with strengthening/mobility   - Keep Call bell within reach  - Keep bed low and locked with side rails adjusted as appropriate  - Keep care items and personal belongings within reach  - Initiate and maintain comfort rounds  - Make Fall Risk Sign visible to staff  - Offer Toileting every 2 Hours, in advance of need  - Initiate/Maintain alarm  - Obtain necessary fall risk management equipment  - Apply yellow socks and bracelet for high fall risk patients  - Consider moving patient to room near nurses station  Outcome: Progressing  Goal: Maintain or return to baseline ADL function  Description: INTERVENTIONS:  -  Assess patient's ability to carry out ADLs; assess patient's baseline for ADL function and identify physical deficits which impact ability to perform ADLs (bathing, care of mouth/teeth, toileting, grooming, dressing, etc.)  - Assess/evaluate cause of self-care deficits   - Assess range of motion  - Assess patient's mobility; develop plan if impaired  - Assess patient's need for assistive devices and provide as appropriate  - Encourage maximum independence but intervene and supervise when necessary  - Involve family in performance of ADLs  - Assess for home care needs following discharge   - Consider OT consult to assist with ADL evaluation and planning for discharge  - Provide patient education as appropriate  Outcome: Progressing  Goal: Maintains/Returns to pre admission functional level  Description: INTERVENTIONS:  - Perform AM-PAC 6 Click Basic Mobility/ Daily Activity assessment daily.  - Set and communicate daily mobility goal to care team and patient/family/caregiver. - Collaborate with rehabilitation services on mobility goals if consulted  - Perform Range of Motion 3 times a day. - Reposition patient every 2 hours.   - Dangle patient 3 times a day  - Stand patient 3 times a day  - Ambulate patient 3 times a day  - Out of bed to chair 3 times a day   - Out of bed for meals 3 times a day  - Out of bed for toileting  - Record patient progress and toleration of activity level   Outcome: Progressing     Problem: DISCHARGE PLANNING  Goal: Discharge to home or other facility with appropriate resources  Description: INTERVENTIONS:  - Identify barriers to discharge w/patient and caregiver  - Arrange for needed discharge resources and transportation as appropriate  - Identify discharge learning needs (meds, wound care, etc.)  - Arrange for interpretive services to assist at discharge as needed  - Refer to Case Management Department for coordinating discharge planning if the patient needs post-hospital services based on physician/advanced practitioner order or complex needs related to functional status, cognitive ability, or social support system  Outcome: Progressing     Problem: Knowledge Deficit  Goal: Patient/family/caregiver demonstrates understanding of disease process, treatment plan, medications, and discharge instructions  Description: Complete learning assessment and assess knowledge base.   Interventions:  - Provide teaching at level of understanding  - Provide teaching via preferred learning methods  Outcome: Progressing     Problem: NEUROSENSORY - ADULT  Goal: Achieves stable or improved neurological status  Description: INTERVENTIONS  - Monitor and report changes in neurological status  - Monitor vital signs such as temperature, blood pressure, glucose, and any other labs ordered   - Initiate measures to prevent increased intracranial pressure  - Monitor for seizure activity and implement precautions if appropriate      Outcome: Progressing  Goal: Remains free of injury related to seizures activity  Description: INTERVENTIONS  - Maintain airway, patient safety  and administer oxygen as ordered  - Monitor patient for seizure activity, document and report duration and description of seizure to physician/advanced practitioner  - If seizure occurs,  ensure patient safety during seizure  - Reorient patient post seizure  - Seizure pads on all 4 side rails  - Instruct patient/family to notify RN of any seizure activity including if an aura is experienced  - Instruct patient/family to call for assistance with activity based on nursing assessment  - Administer anti-seizure medications if ordered    Outcome: Progressing  Goal: Achieves maximal functionality and self care  Description: INTERVENTIONS  - Monitor swallowing and airway patency with patient fatigue and changes in neurological status  - Encourage and assist patient to increase activity and self care.    - Encourage visually impaired, hearing impaired and aphasic patients to use assistive/communication devices  Outcome: Progressing     Problem: GASTROINTESTINAL - ADULT  Goal: Minimal or absence of nausea and/or vomiting  Description: INTERVENTIONS:  - Administer IV fluids if ordered to ensure adequate hydration  - Maintain NPO status until nausea and vomiting are resolved  - Nasogastric tube if ordered  - Administer ordered antiemetic medications as needed  - Provide nonpharmacologic comfort measures as appropriate  - Advance diet as tolerated, if ordered  - Consider nutrition services referral to assist patient with adequate nutrition and appropriate food choices  Outcome: Progressing  Goal: Maintains or returns to baseline bowel function  Description: INTERVENTIONS:  - Assess bowel function  - Encourage oral fluids to ensure adequate hydration  - Administer IV fluids if ordered to ensure adequate hydration  - Administer ordered medications as needed  - Encourage mobilization and activity  - Consider nutritional services referral to assist patient with adequate nutrition and appropriate food choices  Outcome: Progressing  Goal: Maintains adequate nutritional intake  Description: INTERVENTIONS:  - Monitor percentage of each meal consumed  - Identify factors contributing to decreased intake, treat as appropriate  - Assist with meals as needed  - Monitor I&O, weight, and lab values if indicated  - Obtain nutrition services referral as needed  Outcome: Progressing  Goal: Establish and maintain optimal ostomy function  Description: INTERVENTIONS:  - Assess bowel function  - Encourage oral fluids to ensure adequate hydration  - Administer IV fluids if ordered to ensure adequate hydration   - Administer ordered medications as needed  - Encourage mobilization and activity  - Nutrition services referral to assist patient with appropriate food choices  - Assess stoma site  - Consider wound care consult   Outcome: Progressing  Goal: Oral mucous membranes remain intact  Description: INTERVENTIONS  - Assess oral mucosa and hygiene practices  - Implement preventative oral hygiene regimen  - Implement oral medicated treatments as ordered  - Initiate Nutrition services referral as needed  Outcome: Progressing     Problem: METABOLIC, FLUID AND ELECTROLYTES - ADULT  Goal: Electrolytes maintained within normal limits  Description: INTERVENTIONS:  - Monitor labs and assess patient for signs and symptoms of electrolyte imbalances  - Administer electrolyte replacement as ordered  - Monitor response to electrolyte replacements, including repeat lab results as appropriate  - Instruct patient on fluid and nutrition as appropriate  Outcome: Progressing  Goal: Fluid balance maintained  Description: INTERVENTIONS:  - Monitor labs   - Monitor I/O and WT  - Instruct patient on fluid and nutrition as appropriate  - Assess for signs & symptoms of volume excess or deficit  Outcome: Progressing  Goal: Glucose maintained within target range  Description: INTERVENTIONS:  - Monitor Blood Glucose as ordered  - Assess for signs and symptoms of hyperglycemia and hypoglycemia  - Administer ordered medications to maintain glucose within target range  - Assess nutritional intake and initiate nutrition service referral as needed  Outcome: Progressing

## 2023-12-08 NOTE — ED NOTES
Warm hand off, Jessica, called at this time & made aware of inpatient order.      Lin Borden RN  12/07/23 1942

## 2023-12-09 VITALS
HEART RATE: 72 BPM | OXYGEN SATURATION: 97 % | DIASTOLIC BLOOD PRESSURE: 80 MMHG | WEIGHT: 189.8 LBS | HEIGHT: 71 IN | RESPIRATION RATE: 20 BRPM | TEMPERATURE: 97.2 F | BODY MASS INDEX: 26.57 KG/M2 | SYSTOLIC BLOOD PRESSURE: 121 MMHG

## 2023-12-09 PROCEDURE — 99239 HOSP IP/OBS DSCHRG MGMT >30: CPT | Performed by: FAMILY MEDICINE

## 2023-12-09 RX ORDER — CHLORDIAZEPOXIDE HYDROCHLORIDE 25 MG/1
CAPSULE, GELATIN COATED ORAL
Qty: 12 CAPSULE | Refills: 0 | Status: SHIPPED | OUTPATIENT
Start: 2023-12-09 | End: 2023-12-09 | Stop reason: SDUPTHER

## 2023-12-09 RX ORDER — FOLIC ACID 1 MG/1
1 TABLET ORAL DAILY
Qty: 30 TABLET | Refills: 0 | Status: SHIPPED | OUTPATIENT
Start: 2023-12-10 | End: 2024-01-09

## 2023-12-09 RX ORDER — LANOLIN ALCOHOL/MO/W.PET/CERES
100 CREAM (GRAM) TOPICAL DAILY
Qty: 30 TABLET | Refills: 0 | Status: SHIPPED | OUTPATIENT
Start: 2023-12-10 | End: 2024-01-09

## 2023-12-09 RX ORDER — CHLORDIAZEPOXIDE HYDROCHLORIDE 25 MG/1
CAPSULE, GELATIN COATED ORAL
Qty: 12 CAPSULE | Refills: 0 | Status: SHIPPED | OUTPATIENT
Start: 2023-12-09 | End: 2023-12-15

## 2023-12-09 RX ADMIN — Medication 1 TABLET: at 08:17

## 2023-12-09 RX ADMIN — BUSPIRONE HYDROCHLORIDE 10 MG: 10 TABLET ORAL at 08:17

## 2023-12-09 RX ADMIN — CHLORDIAZEPOXIDE HYDROCHLORIDE 25 MG: 25 CAPSULE ORAL at 05:57

## 2023-12-09 RX ADMIN — NICOTINE 21 MG: 21 PATCH, EXTENDED RELEASE TRANSDERMAL at 08:18

## 2023-12-09 RX ADMIN — MELATONIN 6 MG: 3 TAB ORAL at 01:38

## 2023-12-09 RX ADMIN — FOLIC ACID 1 MG: 1 TABLET ORAL at 08:17

## 2023-12-09 RX ADMIN — THIAMINE HCL TAB 100 MG 100 MG: 100 TAB at 08:17

## 2023-12-09 RX ADMIN — ESCITALOPRAM OXALATE 20 MG: 10 TABLET ORAL at 08:17

## 2023-12-09 RX ADMIN — PANTOPRAZOLE SODIUM 40 MG: 40 TABLET, DELAYED RELEASE ORAL at 05:57

## 2023-12-09 RX ADMIN — SODIUM CHLORIDE 125 ML/HR: 0.9 INJECTION, SOLUTION INTRAVENOUS at 06:00

## 2023-12-09 NOTE — QUICK NOTE
Spoke with [employer]- since the pharmacy currently does not have Librium, requesting pharmacist to cancel the prescription. A [facility]. Patient updated.

## 2023-12-09 NOTE — PLAN OF CARE
Problem: Potential for Falls  Goal: Patient will remain free of falls  Description: INTERVENTIONS:  - Educate patient/family on patient safety including physical limitations  - Instruct patient to call for assistance with activity   - Consult OT/PT to assist with strengthening/mobility   - Keep Call bell within reach  - Keep bed low and locked with side rails adjusted as appropriate  - Keep care items and personal belongings within reach  - Initiate and maintain comfort rounds  - Make Fall Risk Sign visible to staff  - Apply yellow socks and bracelet for high fall risk patients  - Consider moving patient to room near nurses station  Outcome: Adequate for Discharge     Problem: PAIN - ADULT  Goal: Verbalizes/displays adequate comfort level or baseline comfort level  Description: Interventions:  - Encourage patient to monitor pain and request assistance  - Assess pain using appropriate pain scale  - Administer analgesics based on type and severity of pain and evaluate response  - Implement non-pharmacological measures as appropriate and evaluate response  - Consider cultural and social influences on pain and pain management  - Notify physician/advanced practitioner if interventions unsuccessful or patient reports new pain  Outcome: Adequate for Discharge     Problem: SAFETY ADULT  Goal: Patient will remain free of falls  Description: INTERVENTIONS:  - Educate patient/family on patient safety including physical limitations  - Instruct patient to call for assistance with activity   - Consult OT/PT to assist with strengthening/mobility   - Keep Call bell within reach  - Keep bed low and locked with side rails adjusted as appropriate  - Keep care items and personal belongings within reach  - Initiate and maintain comfort rounds  - Make Fall Risk Sign visible to staff  - Apply yellow socks and bracelet for high fall risk patients  - Consider moving patient to room near nurses station  Outcome: Adequate for Discharge  Goal: Maintain or return to baseline ADL function  Description: INTERVENTIONS:  -  Assess patient's ability to carry out ADLs; assess patient's baseline for ADL function and identify physical deficits which impact ability to perform ADLs (bathing, care of mouth/teeth, toileting, grooming, dressing, etc.)  - Assess/evaluate cause of self-care deficits   - Assess range of motion  - Assess patient's mobility; develop plan if impaired  - Assess patient's need for assistive devices and provide as appropriate  - Encourage maximum independence but intervene and supervise when necessary  - Involve family in performance of ADLs  - Assess for home care needs following discharge   - Consider OT consult to assist with ADL evaluation and planning for discharge  - Provide patient education as appropriate  Outcome: Adequate for Discharge  Goal: Maintains/Returns to pre admission functional level  Description: INTERVENTIONS:  - Perform AM-PAC 6 Click Basic Mobility/ Daily Activity assessment daily.  - Set and communicate daily mobility goal to care team and patient/family/caregiver.    - Collaborate with rehabilitation services on mobility goals if consulted  - Out of bed for toileting  - Record patient progress and toleration of activity level   Outcome: Adequate for Discharge     Problem: DISCHARGE PLANNING  Goal: Discharge to home or other facility with appropriate resources  Description: INTERVENTIONS:  - Identify barriers to discharge w/patient and caregiver  - Arrange for needed discharge resources and transportation as appropriate  - Identify discharge learning needs (meds, wound care, etc.)  - Arrange for interpretive services to assist at discharge as needed  - Refer to Case Management Department for coordinating discharge planning if the patient needs post-hospital services based on physician/advanced practitioner order or complex needs related to functional status, cognitive ability, or social support system  Outcome: Adequate for Discharge     Problem: Knowledge Deficit  Goal: Patient/family/caregiver demonstrates understanding of disease process, treatment plan, medications, and discharge instructions  Description: Complete learning assessment and assess knowledge base. Interventions:  - Provide teaching at level of understanding  - Provide teaching via preferred learning methods  Outcome: Adequate for Discharge     Problem: NEUROSENSORY - ADULT  Goal: Achieves stable or improved neurological status  Description: INTERVENTIONS  - Monitor and report changes in neurological status  - Monitor vital signs such as temperature, blood pressure, glucose, and any other labs ordered   - Initiate measures to prevent increased intracranial pressure  - Monitor for seizure activity and implement precautions if appropriate      Outcome: Adequate for Discharge  Goal: Remains free of injury related to seizures activity  Description: INTERVENTIONS  - Maintain airway, patient safety  and administer oxygen as ordered  - Monitor patient for seizure activity, document and report duration and description of seizure to physician/advanced practitioner  - If seizure occurs,  ensure patient safety during seizure  - Reorient patient post seizure  - Seizure pads on all 4 side rails  - Instruct patient/family to notify RN of any seizure activity including if an aura is experienced  - Instruct patient/family to call for assistance with activity based on nursing assessment  - Administer anti-seizure medications if ordered    Outcome: Adequate for Discharge  Goal: Achieves maximal functionality and self care  Description: INTERVENTIONS  - Monitor swallowing and airway patency with patient fatigue and changes in neurological status  - Encourage and assist patient to increase activity and self care.    - Encourage visually impaired, hearing impaired and aphasic patients to use assistive/communication devices  Outcome: Adequate for Discharge Problem: GASTROINTESTINAL - ADULT  Goal: Minimal or absence of nausea and/or vomiting  Description: INTERVENTIONS:  - Administer IV fluids if ordered to ensure adequate hydration  - Maintain NPO status until nausea and vomiting are resolved  - Nasogastric tube if ordered  - Administer ordered antiemetic medications as needed  - Provide nonpharmacologic comfort measures as appropriate  - Advance diet as tolerated, if ordered  - Consider nutrition services referral to assist patient with adequate nutrition and appropriate food choices  Outcome: Adequate for Discharge  Goal: Maintains or returns to baseline bowel function  Description: INTERVENTIONS:  - Assess bowel function  - Encourage oral fluids to ensure adequate hydration  - Administer IV fluids if ordered to ensure adequate hydration  - Administer ordered medications as needed  - Encourage mobilization and activity  - Consider nutritional services referral to assist patient with adequate nutrition and appropriate food choices  Outcome: Adequate for Discharge  Goal: Maintains adequate nutritional intake  Description: INTERVENTIONS:  - Monitor percentage of each meal consumed  - Identify factors contributing to decreased intake, treat as appropriate  - Assist with meals as needed  - Monitor I&O, weight, and lab values if indicated  - Obtain nutrition services referral as needed  Outcome: Adequate for Discharge  Goal: Establish and maintain optimal ostomy function  Description: INTERVENTIONS:  - Assess bowel function  - Encourage oral fluids to ensure adequate hydration  - Administer IV fluids if ordered to ensure adequate hydration   - Administer ordered medications as needed  - Encourage mobilization and activity  - Nutrition services referral to assist patient with appropriate food choices  - Assess stoma site  - Consider wound care consult   Outcome: Adequate for Discharge  Goal: Oral mucous membranes remain intact  Description: INTERVENTIONS  - Assess oral mucosa and hygiene practices  - Implement preventative oral hygiene regimen  - Implement oral medicated treatments as ordered  - Initiate Nutrition services referral as needed  Outcome: Adequate for Discharge     Problem: METABOLIC, FLUID AND ELECTROLYTES - ADULT  Goal: Electrolytes maintained within normal limits  Description: INTERVENTIONS:  - Monitor labs and assess patient for signs and symptoms of electrolyte imbalances  - Administer electrolyte replacement as ordered  - Monitor response to electrolyte replacements, including repeat lab results as appropriate  - Instruct patient on fluid and nutrition as appropriate  Outcome: Adequate for Discharge  Goal: Fluid balance maintained  Description: INTERVENTIONS:  - Monitor labs   - Monitor I/O and WT  - Instruct patient on fluid and nutrition as appropriate  - Assess for signs & symptoms of volume excess or deficit  Outcome: Adequate for Discharge  Goal: Glucose maintained within target range  Description: INTERVENTIONS:  - Monitor Blood Glucose as ordered  - Assess for signs and symptoms of hyperglycemia and hypoglycemia  - Administer ordered medications to maintain glucose within target range  - Assess nutritional intake and initiate nutrition service referral as needed  Outcome: Adequate for Discharge

## 2023-12-09 NOTE — ASSESSMENT & PLAN NOTE
Presents with 10-year history of alcohol abuse, currently drinking 1/2 handle of vodka daily. Last drink 5 PM on 12/7  History of at least 5 seizures during past episodes of alcohol withdrawal.  Been hospitalized for medical detox multiple times in the past and has attended rehab. Patient and family interested in rehab placement. Case management assistance appreciated  [facility] protocol  Librium taper  Ativan as needed seizure  Seizure precautions  Thiamine, folic acid, MVI supplementation  Discussed with patient on bedside regarding treatment and management plan including rehab versus detox facility. Patient at this time is not interested due to his work. Outpatient resources was provided. Patient remained medically stable, patient is being discharged back home with follow-up with PCP. Outpatient resources provided for alcohol rehab.

## 2023-12-09 NOTE — CASE MANAGEMENT
Case Management Discharge Planning Note    Patient name García Olivo  Location [address] [facility] [place] [id number] MRN [id number]  : 1990 Date 2023       Current Admission Date: 2023  Current Admission Diagnosis:Alcohol use disorder, severe, dependence ([address])   Patient Active Problem List    Diagnosis Date Noted    Nicotine dependence due to vaping tobacco product 2023    Hypokalemia 10/17/2021    Alcohol use disorder, severe, dependence ([address]) 10/16/2021    Alcohol withdrawal syndrome without complication ([address])     COVID-19 10/16/2021    Anxiety 10/16/2021    Tobacco abuse 10/16/2021      LOS (days): 2  Geometric Mean LOS (GMLOS) (days):   Days to GMLOS:     OBJECTIVE:  Risk of Unplanned Readmission Score: 10.32         Current admission status: Inpatient   Preferred Pharmacy:   PATIENT/FAMILY REPORTS NO PREFERRED PHARMACY  No address on file      [id number], [address] [address]  Phone: [phone] Fax: [address] [facility], [address] [place] [address]  Phone: [phone] Fax: [phone]    [id number] [address]  [address]  [address] [facility] [address]  Phone: [phone] Fax: [phone]    Primary Care Provider: No primary care provider on file. Primary Insurance: [employer]  Secondary Insurance:     DISCHARGE DETAILS:     CM met with patient to discuss discharge planning. CM provided patient with OP d/a providers. Patient also requested peer support information. CM gave numbers for [facility] and [facility]. Patient had [facility] yesterday talk to him and CM let him know he can also reach out to that office as listed on paperwork CM gave. Patient discussed that they are looking for individuals in recovery with lived experience to support him. He is very active with AA.  Patient's parents are on their way in to pick patient up for discharge. CM to follow patient's care and discharge needs.

## 2023-12-09 NOTE — PLAN OF CARE
Problem: Potential for Falls  Goal: Patient will remain free of falls  Description: INTERVENTIONS:  - Educate patient/family on patient safety including physical limitations  - Instruct patient to call for assistance with activity   - Consult OT/PT to assist with strengthening/mobility   - Keep Call bell within reach  - Keep bed low and locked with side rails adjusted as appropriate  - Keep care items and personal belongings within reach  - Initiate and maintain comfort rounds  - Make Fall Risk Sign visible to staff  - Apply yellow socks and bracelet for high fall risk patients  - Consider moving patient to room near nurses station  12/9/2023 1035 by Aston Martin RN  Outcome: Adequate for Discharge  12/9/2023 1035 by Aston Martin RN  Outcome: Adequate for Discharge     Problem: PAIN - ADULT  Goal: Verbalizes/displays adequate comfort level or baseline comfort level  Description: Interventions:  - Encourage patient to monitor pain and request assistance  - Assess pain using appropriate pain scale  - Administer analgesics based on type and severity of pain and evaluate response  - Implement non-pharmacological measures as appropriate and evaluate response  - Consider cultural and social influences on pain and pain management  - Notify physician/advanced practitioner if interventions unsuccessful or patient reports new pain  12/9/2023 1035 by Aston Martni RN  Outcome: Adequate for Discharge  12/9/2023 1035 by Aston Martin RN  Outcome: Adequate for Discharge     Problem: SAFETY ADULT  Goal: Patient will remain free of falls  Description: INTERVENTIONS:  - Educate patient/family on patient safety including physical limitations  - Instruct patient to call for assistance with activity   - Consult OT/PT to assist with strengthening/mobility   - Keep Call bell within reach  - Keep bed low and locked with side rails adjusted as appropriate  - Keep care items and personal belongings within reach  - Initiate and maintain comfort rounds  - Make Fall Risk Sign visible to staff  - Apply yellow socks and bracelet for high fall risk patients  - Consider moving patient to room near nurses station  12/9/2023 1035 by Sadia Cooper RN  Outcome: Adequate for Discharge  12/9/2023 1035 by Sadia Cooper RN  Outcome: Adequate for Discharge  Goal: Maintain or return to baseline ADL function  Description: INTERVENTIONS:  -  Assess patient's ability to carry out ADLs; assess patient's baseline for ADL function and identify physical deficits which impact ability to perform ADLs (bathing, care of mouth/teeth, toileting, grooming, dressing, etc.)  - Assess/evaluate cause of self-care deficits   - Assess range of motion  - Assess patient's mobility; develop plan if impaired  - Assess patient's need for assistive devices and provide as appropriate  - Encourage maximum independence but intervene and supervise when necessary  - Involve family in performance of ADLs  - Assess for home care needs following discharge   - Consider OT consult to assist with ADL evaluation and planning for discharge  - Provide patient education as appropriate  12/9/2023 1035 by Sadia Cooper RN  Outcome: Adequate for Discharge  12/9/2023 1035 by Sadia Cooper RN  Outcome: Adequate for Discharge  Goal: Maintains/Returns to pre admission functional level  Description: INTERVENTIONS:  - Perform AM-PAC 6 Click Basic Mobility/ Daily Activity assessment daily.  - Set and communicate daily mobility goal to care team and patient/family/caregiver.    - Collaborate with rehabilitation services on mobility goals if consulted  - Out of bed for toileting  - Record patient progress and toleration of activity level   12/9/2023 1035 by Sadia Cooper RN  Outcome: Adequate for Discharge  12/9/2023 1035 by Sadia Cooper RN  Outcome: Adequate for Discharge     Problem: DISCHARGE PLANNING  Goal: Discharge to home or other facility with appropriate resources  Description: INTERVENTIONS:  - Identify barriers to discharge w/patient and caregiver  - Arrange for needed discharge resources and transportation as appropriate  - Identify discharge learning needs (meds, wound care, etc.)  - Arrange for interpretive services to assist at discharge as needed  - Refer to Case Management Department for coordinating discharge planning if the patient needs post-hospital services based on physician/advanced practitioner order or complex needs related to functional status, cognitive ability, or social support system  12/9/2023 1035 by Danial Ye RN  Outcome: Adequate for Discharge  12/9/2023 1035 by Danial Ye RN  Outcome: Adequate for Discharge     Problem: Knowledge Deficit  Goal: Patient/family/caregiver demonstrates understanding of disease process, treatment plan, medications, and discharge instructions  Description: Complete learning assessment and assess knowledge base.   Interventions:  - Provide teaching at level of understanding  - Provide teaching via preferred learning methods  12/9/2023 1035 by Danial Ye RN  Outcome: Adequate for Discharge  12/9/2023 1035 by Danial Ye RN  Outcome: Adequate for Discharge     Problem: NEUROSENSORY - ADULT  Goal: Achieves stable or improved neurological status  Description: INTERVENTIONS  - Monitor and report changes in neurological status  - Monitor vital signs such as temperature, blood pressure, glucose, and any other labs ordered   - Initiate measures to prevent increased intracranial pressure  - Monitor for seizure activity and implement precautions if appropriate      12/9/2023 1035 by Danial Ye RN  Outcome: Adequate for Discharge  12/9/2023 1035 by Danial Ye RN  Outcome: Adequate for Discharge  Goal: Remains free of injury related to seizures activity  Description: INTERVENTIONS  - Maintain airway, patient safety  and administer oxygen as ordered  - Monitor patient for seizure activity, document and report duration and description of seizure to physician/advanced practitioner  - If seizure occurs,  ensure patient safety during seizure  - Reorient patient post seizure  - Seizure pads on all 4 side rails  - Instruct patient/family to notify RN of any seizure activity including if an aura is experienced  - Instruct patient/family to call for assistance with activity based on nursing assessment  - Administer anti-seizure medications if ordered    12/9/2023 1035 by Flex Gallo RN  Outcome: Adequate for Discharge  12/9/2023 1035 by Flex Gallo RN  Outcome: Adequate for Discharge  Goal: Achieves maximal functionality and self care  Description: INTERVENTIONS  - Monitor swallowing and airway patency with patient fatigue and changes in neurological status  - Encourage and assist patient to increase activity and self care.    - Encourage visually impaired, hearing impaired and aphasic patients to use assistive/communication devices  12/9/2023 1035 by Flex Gallo RN  Outcome: Adequate for Discharge  12/9/2023 1035 by Flex Gallo RN  Outcome: Adequate for Discharge     Problem: GASTROINTESTINAL - ADULT  Goal: Minimal or absence of nausea and/or vomiting  Description: INTERVENTIONS:  - Administer IV fluids if ordered to ensure adequate hydration  - Maintain NPO status until nausea and vomiting are resolved  - Nasogastric tube if ordered  - Administer ordered antiemetic medications as needed  - Provide nonpharmacologic comfort measures as appropriate  - Advance diet as tolerated, if ordered  - Consider nutrition services referral to assist patient with adequate nutrition and appropriate food choices  12/9/2023 1035 by Flex Gallo RN  Outcome: Adequate for Discharge  12/9/2023 1035 by Flex Gallo RN  Outcome: Adequate for Discharge  Goal: Maintains or returns to baseline bowel function  Description: INTERVENTIONS:  - Assess bowel function  - Encourage oral fluids to ensure adequate hydration  - Administer IV fluids if ordered to ensure adequate hydration  - Administer ordered medications as needed  - Encourage mobilization and activity  - Consider nutritional services referral to assist patient with adequate nutrition and appropriate food choices  12/9/2023 1035 by Joe Zaragoza RN  Outcome: Adequate for Discharge  12/9/2023 1035 by Joe Zaragoza RN  Outcome: Adequate for Discharge  Goal: Maintains adequate nutritional intake  Description: INTERVENTIONS:  - Monitor percentage of each meal consumed  - Identify factors contributing to decreased intake, treat as appropriate  - Assist with meals as needed  - Monitor I&O, weight, and lab values if indicated  - Obtain nutrition services referral as needed  12/9/2023 1035 by Joe Zaragoza RN  Outcome: Adequate for Discharge  12/9/2023 1035 by Joe Zaragoza RN  Outcome: Adequate for Discharge  Goal: Establish and maintain optimal ostomy function  Description: INTERVENTIONS:  - Assess bowel function  - Encourage oral fluids to ensure adequate hydration  - Administer IV fluids if ordered to ensure adequate hydration   - Administer ordered medications as needed  - Encourage mobilization and activity  - Nutrition services referral to assist patient with appropriate food choices  - Assess stoma site  - Consider wound care consult   12/9/2023 1035 by Joe Zaragoza RN  Outcome: Adequate for Discharge  12/9/2023 1035 by Joe Zaragoza RN  Outcome: Adequate for Discharge  Goal: Oral mucous membranes remain intact  Description: INTERVENTIONS  - Assess oral mucosa and hygiene practices  - Implement preventative oral hygiene regimen  - Implement oral medicated treatments as ordered  - Initiate Nutrition services referral as needed  12/9/2023 1035 by Joe Zaragoza RN  Outcome: Adequate for Discharge  12/9/2023 1035 by Joe Zaragoza RN  Outcome: Adequate for Discharge     Problem: METABOLIC, FLUID AND ELECTROLYTES - ADULT  Goal: Electrolytes maintained within normal limits  Description: INTERVENTIONS:  - Monitor labs and assess patient for signs and symptoms of electrolyte imbalances  - Administer electrolyte replacement as ordered  - Monitor response to electrolyte replacements, including repeat lab results as appropriate  - Instruct patient on fluid and nutrition as appropriate  12/9/2023 1035 by Joe Zaragoza RN  Outcome: Adequate for Discharge  12/9/2023 1035 by Joe Zaragoza RN  Outcome: Adequate for Discharge  Goal: Fluid balance maintained  Description: INTERVENTIONS:  - Monitor labs   - Monitor I/O and WT  - Instruct patient on fluid and nutrition as appropriate  - Assess for signs & symptoms of volume excess or deficit  12/9/2023 1035 by Joe Zaragoza RN  Outcome: Adequate for Discharge  12/9/2023 1035 by Joe Zaragoza RN  Outcome: Adequate for Discharge  Goal: Glucose maintained within target range  Description: INTERVENTIONS:  - Monitor Blood Glucose as ordered  - Assess for signs and symptoms of hyperglycemia and hypoglycemia  - Administer ordered medications to maintain glucose within target range  - Assess nutritional intake and initiate nutrition service referral as needed  12/9/2023 1035 by Joe Zaragoza RN  Outcome: Adequate for Discharge  12/9/2023 1035 by Joe Zaragoza RN  Outcome: Adequate for Discharge

## 2023-12-09 NOTE — DISCHARGE SUMMARY
[address] [facility]  Discharge- [address] [facility] 1990, 34 y.o. male MRN: [id number]  Unit/Bed#: MS [facility] Encounter: [id number]  Primary Care Provider: No primary care provider on file. Date and time admitted to hospital: 12/7/2023  5:57 PM    * Alcohol use disorder, severe, dependence ([address] [facility])  Assessment & Plan  Presents with 10-year history of alcohol abuse, currently drinking 1/2 handle of vodka daily. Last drink 5 PM on 12/7  History of at least 5 seizures during past episodes of alcohol withdrawal.  Been hospitalized for medical detox multiple times in the past and has attended rehab. Patient and family interested in rehab placement. Case management assistance appreciated  [facility] protocol  Librium taper  Ativan as needed seizure  Seizure precautions  Thiamine, folic acid, MVI supplementation  Discussed with patient on bedside regarding treatment and management plan including rehab versus detox facility. Patient at this time is not interested due to his work. Outpatient resources was provided. Patient remained medically stable, patient is being discharged back home with follow-up with PCP. Outpatient resources provided for alcohol rehab. Nicotine dependence due to vaping tobacco product  Assessment & Plan  Nicotine cessation counseling  Nicotine replacement patch    Anxiety  Assessment & Plan  Continue PTA Lexapro 10 mg daily, BuSpar 10 mg 3 times daily, trazodone 50 mg at bedtime  Outpatient follow-up        Medical Problems       Resolved Problems  Date Reviewed: 10/17/2021   None       Discharging Physician / Practitioner: Yo Weinstein MD  PCP: No primary care provider on file.   Admission Date:   Admission Orders (From admission, onward)       Ordered        12/07/23 1939  INPATIENT ADMISSION  Once                          Discharge Date: 12/09/23    Consultations During Hospital Stay:  none    Procedures Performed:   No orders to display         Significant Findings / Test Results:   Lab Results   Component Value Date    WBC 5.69 12/08/2023    HGB 13.0 12/08/2023    HCT 37.2 12/08/2023    MCV 89 12/08/2023     12/08/2023     Lab Results   Component Value Date    SODIUM 137 12/08/2023    K 3.4 (L) 12/08/2023     12/08/2023    CO2 26 12/08/2023    AGAP 11 12/08/2023    BUN 12 12/08/2023    CREATININE 1.10 12/08/2023    GLUC 84 12/08/2023    CALCIUM 8.3 (L) 12/08/2023    AST 37 12/08/2023    ALT 38 12/08/2023    ALKPHOS 44 12/08/2023    TP 6.4 12/08/2023    TBILI 1.23 (H) 12/08/2023    EGFR 88 12/08/2023         Incidental Findings:   As mentioned above  I reviewed the above mentioned incidental findings with the patient and/or family and they expressed understanding. Test Results Pending at Discharge (will require follow up):   none     Outpatient Tests Requested:  none    Complications: None    Reason for Admission: Alcohol abuse    Hospital Course:   Grecia Watosn is a 34 y.o. male patient who originally presented to the hospital on 12/7/2023 due to alcohol abuse after being Check drinking, initial alcohol level was more than 298. Patient had previous history of alcohol abuse with withdrawal seizure, patient previously been admitted and went for inpatient rehab and detox. Initially patient admitted for interested for rehab placement. But later on, after discussion is done with patient with this provider, patient refused to go to in patient rehab or detox but interested with outpatient resources due to his work schedule. Outpatient resources provided, appreciate case management recommendation. Patient remained medically stable on day of discharge based on clinical exam and lab reviews. Patient is getting back discharged home with follow-up with PCP outpatient basis as well as outpatient resources provided for alcohol dependence. Patient can return to work on Monday.     Please see above list of diagnoses and related plan for additional information. Condition at Discharge: stable    Discharge Day Visit / Exam:   Subjective: Seen and evaluated during the event. Resting comfortably. Moving around. Denies any dizziness, headache, sore throat, diarrhea, anxiety, tremor, hallucination, any vivid dreams. Vitals: Blood Pressure: 121/80 (12/09/23 0930)  Pulse: 72 (12/09/23 0930)  Temperature: (!) 97.2 °F (36.2 °C) (12/09/23 0930)  Temp Source: Temporal (12/09/23 0930)  Respirations: 20 (12/09/23 0930)  Height: 5' 11" (180.3 cm) (12/07/23 2028)  Weight - Scale: 86.1 kg (189 lb 12.8 oz) (12/07/23 2028)  SpO2: 97 % (12/09/23 0930)  Exam:   Physical Exam  Vitals and nursing note reviewed. Constitutional:       Appearance: Normal appearance. He is not ill-appearing or diaphoretic. HENT:      Mouth/Throat:      Mouth: Mucous membranes are moist.      Pharynx: Oropharynx is clear. No oropharyngeal exudate or posterior oropharyngeal erythema. Eyes:      General: No scleral icterus. Left eye: No discharge. Extraocular Movements: Extraocular movements intact. Pupils: Pupils are equal, round, and reactive to light. Cardiovascular:      Rate and Rhythm: Normal rate. Heart sounds: No murmur heard. No friction rub. No gallop. Pulmonary:      Effort: Pulmonary effort is normal. No respiratory distress. Breath sounds: No stridor. No wheezing or rhonchi. Abdominal:      General: Abdomen is flat. Bowel sounds are normal. There is no distension. Palpations: There is no mass. Tenderness: There is no abdominal tenderness. Hernia: No hernia is present. Musculoskeletal:      Right lower leg: No edema. Left lower leg: No edema. Neurological:      Mental Status: He is alert and oriented to person, place, and time. Cranial Nerves: No cranial nerve deficit. Sensory: No sensory deficit. Motor: No weakness.       Coordination: Coordination normal.      Gait: Gait normal.      Deep Tendon Reflexes: Reflexes normal.   Psychiatric:         Mood and Affect: Mood normal.         Behavior: Behavior normal.         Thought Content: Thought content normal.         Judgment: Judgment normal.         Discussion with Family: Updated  (father) via phone. Discharge instructions/Information to patient and family:   See after visit summary for information provided to patient and family. Provisions for Follow-Up Care:  See after visit summary for information related to follow-up care and any pertinent home health orders. Mobility at time of Discharge:   Basic Mobility Inpatient Raw Score: 22  JH-HLM Goal: 7: Walk 25 feet or more  JH-HLM Achieved: 7: Walk 25 feet or more  HLM Goal achieved. Continue to encourage appropriate mobility. Disposition:   Home    Planned Readmission: If condition get worse. Discharge Statement:  Greater than 50% of the total time was spent examining patient, answering all patient questions, arranging and discussing plan of care with patient as well as directly providing post-discharge instructions. Additional time then spent on discharge activities. Discharge Medications:  See after visit summary for reconciled discharge medications provided to patient and/or family.       **Please Note: This note may have been constructed using a voice recognition system**

## 2023-12-10 LAB
ATRIAL RATE: 91 BPM
P AXIS: 64 DEGREES
PR INTERVAL: 184 MS
QRS AXIS: 74 DEGREES
QRSD INTERVAL: 86 MS
QT INTERVAL: 360 MS
QTC INTERVAL: 442 MS
T WAVE AXIS: 63 DEGREES
VENTRICULAR RATE: 91 BPM

## 2023-12-11 NOTE — UTILIZATION REVIEW
NOTIFICATION OF ADMISSION DISCHARGE   This is a Notification of Discharge from [address]. Please be advised that this patient has been discharge from our facility. Below you will find the admission and discharge date and time including the patient’s disposition. UTILIZATION REVIEW CONTACT:  Marysol Lucio  Utilization   Network Utilization Review Department  Phone: [phone] x carefully listen to the prompts. All voicemails are confidential.  Email: [email or web address]     ADMISSION INFORMATION  PRESENTATION DATE: 12/7/2023  5:57 PM  OBERVATION ADMISSION DATE:   INPATIENT ADMISSION DATE: 12/7/23  7:39 PM   DISCHARGE DATE: 12/9/2023 12:16 PM   DISPOSITION:Home/Self Care    Network Utilization Review Department  ATTENTION: Please call with any questions or concerns to [phone] and carefully listen to the prompts so that you are directed to the right person. All voicemails are confidential.   For Discharge needs, contact Care Management DC Support Team at [phone] opt. 2  Send all requests for admission clinical reviews, approved or denied determinations and any other requests to dedicated fax number below belonging to the campus where the patient is receiving treatment.  List of dedicated fax numbers for the Facilities:  [place] DENIALS (Administrative/Medical Necessity) [phone]   DISCHARGE SUPPORT TEAM (Network) [phone] [facility] (Maternity/NICU/Pediatrics) [phone]   [address] [facility] [address] [place] [address] [address]  [place] [phone]

## 2023-12-12 DIAGNOSIS — F41.9 ANXIETY AND DEPRESSION: ICD-10-CM

## 2023-12-12 DIAGNOSIS — G47.09 OTHER INSOMNIA: ICD-10-CM

## 2023-12-12 DIAGNOSIS — F32.A ANXIETY AND DEPRESSION: ICD-10-CM

## 2023-12-12 NOTE — TELEPHONE ENCOUNTER
"Medicine Refill Request    Last Office Visit: 9/22/2023   Last Consult Visit: Visit date not found  Last Telemedicine Visit: 3/2/2021 Javier Murphy MD    Next Appointment: 1/9/2024      Current Outpatient Medications:   •  busPIRone (BUSPAR) 10 mg tablet, Take 1 tablet (10 mg total) by mouth 3 (three) times a day., Disp: 270 tablet, Rfl: 0  •  calcium carbonate (TUMS) 200 mg calcium (500 mg) chewable tablet, Take 500 mg by mouth., Disp: , Rfl:   •  escitalopram (LEXAPRO) 20 mg tablet, Take 1 tablet (20 mg total) by mouth daily., Disp: 90 tablet, Rfl: 1  •  melatonin 5 mg capsule, Take 5 mg by mouth nightly., Disp: , Rfl:   •  multivitamin with minerals tablet, Take 1 tablet by mouth daily., Disp: , Rfl:   •  thiamine 100 mg tablet, Take 100 mg by mouth daily., Disp: , Rfl:   •  traZODone (DESYREL) 50 mg tablet, Take 1 tablet (50 mg total) by mouth nightly as needed (insomnia)., Disp: 90 tablet, Rfl: 1      BP Readings from Last 3 Encounters:   09/22/23 123/68   06/12/23 120/78   05/09/23 126/70       Recent Lab results:  Lab Results   Component Value Date    CHOL 170 09/28/2021   ,   Lab Results   Component Value Date    HDL 46 09/28/2021   ,   Lab Results   Component Value Date    LDLCALC 112 (H) 09/28/2021   ,   Lab Results   Component Value Date    TRIG 59 09/28/2021        Lab Results   Component Value Date    GLUCOSE 96 02/13/2023   , No results found for: \"HGBA1C\"      Lab Results   Component Value Date    CREATININE 0.8 02/13/2023       Lab Results   Component Value Date    TSH 0.801 11/04/2020         No results found for: \"HGBA1C\"  "

## 2023-12-13 RX ORDER — BUSPIRONE HYDROCHLORIDE 10 MG/1
10 TABLET ORAL 3 TIMES DAILY
Qty: 270 TABLET | Refills: 0 | Status: SHIPPED | OUTPATIENT
Start: 2023-12-13 | End: 2024-01-09 | Stop reason: SDUPTHER

## 2023-12-13 RX ORDER — TRAZODONE HYDROCHLORIDE 50 MG/1
50 TABLET ORAL NIGHTLY PRN
Qty: 90 TABLET | Refills: 1 | Status: SHIPPED | OUTPATIENT
Start: 2023-12-13 | End: 2024-04-10 | Stop reason: SDUPTHER

## 2023-12-13 RX ORDER — ESCITALOPRAM OXALATE 20 MG/1
20 TABLET ORAL DAILY
Qty: 90 TABLET | Refills: 1 | Status: SHIPPED | OUTPATIENT
Start: 2023-12-13 | End: 2024-06-10

## 2023-12-21 ENCOUNTER — HOSPITAL ENCOUNTER (EMERGENCY)
Facility: HOSPITAL | Age: 33
Discharge: HOME | End: 2023-12-21
Attending: EMERGENCY MEDICINE
Payer: COMMERCIAL

## 2023-12-21 VITALS
BODY MASS INDEX: 26.46 KG/M2 | HEART RATE: 84 BPM | SYSTOLIC BLOOD PRESSURE: 120 MMHG | DIASTOLIC BLOOD PRESSURE: 68 MMHG | WEIGHT: 189 LBS | TEMPERATURE: 97.3 F | HEIGHT: 71 IN | RESPIRATION RATE: 18 BRPM | OXYGEN SATURATION: 96 %

## 2023-12-21 DIAGNOSIS — F10.10 ALCOHOL ABUSE: Primary | ICD-10-CM

## 2023-12-21 LAB
ALBUMIN SERPL BCP-MCNC: 5.1 G/DL (ref 3.5–5)
ALP SERPL-CCNC: 56 U/L (ref 34–104)
ALT SERPL W P-5'-P-CCNC: 25 U/L (ref 7–52)
ANION GAP SERPL CALCULATED.3IONS-SCNC: 12 MMOL/L
APTT PPP: 30 SECONDS (ref 23–37)
AST SERPL W P-5'-P-CCNC: 29 U/L (ref 13–39)
ATRIAL RATE: 77 BPM
ATRIAL RATE: 81 BPM
BASOPHILS # BLD AUTO: 0.06 THOUSANDS/ÂΜL (ref 0–0.1)
BASOPHILS NFR BLD AUTO: 1 % (ref 0–1)
BILIRUB SERPL-MCNC: 1.81 MG/DL (ref 0.2–1)
BUN SERPL-MCNC: 12 MG/DL (ref 5–25)
CALCIUM SERPL-MCNC: 9.6 MG/DL (ref 8.4–10.2)
CHLORIDE SERPL-SCNC: 89 MMOL/L (ref 96–108)
CO2 SERPL-SCNC: 37 MMOL/L (ref 21–32)
CREAT SERPL-MCNC: 1.32 MG/DL (ref 0.6–1.3)
EOSINOPHIL # BLD AUTO: 0.12 THOUSAND/ÂΜL (ref 0–0.61)
EOSINOPHIL NFR BLD AUTO: 2 % (ref 0–6)
ERYTHROCYTE [DISTWIDTH] IN BLOOD BY AUTOMATED COUNT: 12.6 % (ref 11.6–15.1)
ETHANOL SERPL-MCNC: 134 MG/DL
GFR SERPL CREATININE-BSD FRML MDRD: 71 ML/MIN/1.73SQ M
GLUCOSE SERPL-MCNC: 126 MG/DL (ref 65–140)
HCT VFR BLD AUTO: 46.9 % (ref 36.5–49.3)
HGB BLD-MCNC: 16.4 G/DL (ref 12–17)
IMM GRANULOCYTES # BLD AUTO: 0.02 THOUSAND/UL (ref 0–0.2)
IMM GRANULOCYTES NFR BLD AUTO: 0 % (ref 0–2)
INR PPP: 1.03 (ref 0.84–1.19)
LACTATE SERPL-SCNC: 2.6 MMOL/L (ref 0.5–2)
LIPASE SERPL-CCNC: 27 U/L (ref 11–82)
LYMPHOCYTES # BLD AUTO: 1.82 THOUSANDS/ÂΜL (ref 0.6–4.47)
LYMPHOCYTES NFR BLD AUTO: 27 % (ref 14–44)
MAGNESIUM SERPL-MCNC: 2.3 MG/DL (ref 1.9–2.7)
MCH RBC QN AUTO: 31.1 PG (ref 26.8–34.3)
MCHC RBC AUTO-ENTMCNC: 35 G/DL (ref 31.4–37.4)
MCV RBC AUTO: 89 FL (ref 82–98)
MONOCYTES # BLD AUTO: 0.56 THOUSAND/ÂΜL (ref 0.17–1.22)
MONOCYTES NFR BLD AUTO: 8 % (ref 4–12)
NEUTROPHILS # BLD AUTO: 4.2 THOUSANDS/ÂΜL (ref 1.85–7.62)
NEUTS SEG NFR BLD AUTO: 62 % (ref 43–75)
NRBC BLD AUTO-RTO: 0 /100 WBCS
P AXIS: 16 DEGREES
P AXIS: 57 DEGREES
PLATELET # BLD AUTO: 314 THOUSANDS/UL (ref 149–390)
PMV BLD AUTO: 8.4 FL (ref 8.9–12.7)
POTASSIUM SERPL-SCNC: 3.3 MMOL/L (ref 3.5–5.3)
PR INTERVAL: 172 MS
PR INTERVAL: 176 MS
PROT SERPL-MCNC: 8.1 G/DL (ref 6.4–8.4)
PROTHROMBIN TIME: 13.8 SECONDS (ref 11.6–14.5)
QRS AXIS: -15 DEGREES
QRS AXIS: 88 DEGREES
QRSD INTERVAL: 86 MS
QRSD INTERVAL: 86 MS
QT INTERVAL: 398 MS
QT INTERVAL: 424 MS
QTC INTERVAL: 462 MS
QTC INTERVAL: 479 MS
RBC # BLD AUTO: 5.28 MILLION/UL (ref 3.88–5.62)
SODIUM SERPL-SCNC: 138 MMOL/L (ref 135–147)
T WAVE AXIS: -4 DEGREES
T WAVE AXIS: 64 DEGREES
VENTRICULAR RATE: 77 BPM
VENTRICULAR RATE: 81 BPM
WBC # BLD AUTO: 6.78 THOUSAND/UL (ref 4.31–10.16)

## 2023-12-21 PROCEDURE — 83735 ASSAY OF MAGNESIUM: CPT | Performed by: EMERGENCY MEDICINE

## 2023-12-21 PROCEDURE — 85610 PROTHROMBIN TIME: CPT | Performed by: EMERGENCY MEDICINE

## 2023-12-21 PROCEDURE — 82077 ASSAY SPEC XCP UR&BREATH IA: CPT | Performed by: EMERGENCY MEDICINE

## 2023-12-21 PROCEDURE — 93005 ELECTROCARDIOGRAM TRACING: CPT

## 2023-12-21 PROCEDURE — 80053 COMPREHEN METABOLIC PANEL: CPT | Performed by: EMERGENCY MEDICINE

## 2023-12-21 PROCEDURE — 85730 THROMBOPLASTIN TIME PARTIAL: CPT | Performed by: EMERGENCY MEDICINE

## 2023-12-21 PROCEDURE — 36415 COLL VENOUS BLD VENIPUNCTURE: CPT | Performed by: EMERGENCY MEDICINE

## 2023-12-21 PROCEDURE — 83605 ASSAY OF LACTIC ACID: CPT | Performed by: EMERGENCY MEDICINE

## 2023-12-21 PROCEDURE — 83690 ASSAY OF LIPASE: CPT | Performed by: EMERGENCY MEDICINE

## 2023-12-21 PROCEDURE — 85025 COMPLETE CBC W/AUTO DIFF WBC: CPT | Performed by: EMERGENCY MEDICINE

## 2023-12-21 PROCEDURE — 99285 EMERGENCY DEPT VISIT HI MDM: CPT | Performed by: EMERGENCY MEDICINE

## 2023-12-21 RX ORDER — ONDANSETRON 4 MG/1
4 TABLET, ORALLY DISINTEGRATING ORAL EVERY 6 HOURS PRN
Qty: 10 TABLET | Refills: 0 | Status: SHIPPED | OUTPATIENT
Start: 2023-12-21 | End: 2023-12-21

## 2023-12-21 RX ORDER — ONDANSETRON 4 MG/1
4 TABLET, ORALLY DISINTEGRATING ORAL EVERY 6 HOURS PRN
Qty: 10 TABLET | Refills: 0 | Status: SHIPPED | OUTPATIENT
Start: 2023-12-21

## 2023-12-21 RX ORDER — CHLORDIAZEPOXIDE HYDROCHLORIDE 25 MG/1
50 CAPSULE, GELATIN COATED ORAL 4 TIMES DAILY PRN
Qty: 30 CAPSULE | Refills: 0 | Status: SHIPPED | OUTPATIENT
Start: 2023-12-21

## 2023-12-21 RX ORDER — DIPHENHYDRAMINE HYDROCHLORIDE 50 MG/ML
12.5 INJECTION INTRAMUSCULAR; INTRAVENOUS ONCE
Status: COMPLETED | OUTPATIENT
Start: 2023-12-21 | End: 2023-12-21

## 2023-12-21 RX ORDER — CHLORDIAZEPOXIDE HYDROCHLORIDE 25 MG/1
50 CAPSULE, GELATIN COATED ORAL 4 TIMES DAILY PRN
Qty: 30 CAPSULE | Refills: 0 | Status: SHIPPED | OUTPATIENT
Start: 2023-12-21 | End: 2023-12-21

## 2023-12-21 RX ORDER — DROPERIDOL 2.5 MG/ML
1.25 INJECTION, SOLUTION INTRAMUSCULAR; INTRAVENOUS ONCE
Status: COMPLETED | OUTPATIENT
Start: 2023-12-21 | End: 2023-12-21

## 2023-12-21 RX ORDER — LORAZEPAM 2 MG/ML
1 INJECTION INTRAMUSCULAR ONCE
Status: COMPLETED | OUTPATIENT
Start: 2023-12-21 | End: 2023-12-21

## 2023-12-21 RX ORDER — POTASSIUM CHLORIDE 20 MEQ/1
40 TABLET, EXTENDED RELEASE ORAL ONCE
Status: COMPLETED | OUTPATIENT
Start: 2023-12-21 | End: 2023-12-21

## 2023-12-21 RX ADMIN — DROPERIDOL 1.25 MG: 2.5 INJECTION, SOLUTION INTRAMUSCULAR; INTRAVENOUS at 12:12

## 2023-12-21 RX ADMIN — LORAZEPAM 1 MG: 2 INJECTION INTRAMUSCULAR; INTRAVENOUS at 12:14

## 2023-12-21 RX ADMIN — POTASSIUM CHLORIDE 40 MEQ: 1500 TABLET, EXTENDED RELEASE ORAL at 13:06

## 2023-12-21 RX ADMIN — SODIUM CHLORIDE 2000 ML: 0.9 INJECTION, SOLUTION INTRAVENOUS at 12:11

## 2023-12-21 RX ADMIN — DIPHENHYDRAMINE HYDROCHLORIDE 12.5 MG: 50 INJECTION, SOLUTION INTRAMUSCULAR; INTRAVENOUS at 12:12

## 2023-12-21 NOTE — ED PROVIDER NOTES
History  Chief Complaint   Patient presents with    Alcohol Problem     Pt presents to ED for alcohol problem. Unsure if he would like to do inpatient treatment. Seen here recently for the same. Last drink 0300. Reporting nausea/vomiting currently.      Patient drinks 2 handles a day of vodka.  Last drink was yesterday.  Feels anxious.  Feels shaky.  Has nausea and vomiting.  No diarrhea.  No hallucinations.  Denies any suicidal or homicidal ideation.  Denies any drug use.      History provided by:  Patient   used: No    Alcohol Problem  Location:  Alcohol abuse.  Last use yesterday.  Quality:  Shaky and anxious  Severity:  Moderate  Onset quality:  Gradual  Duration:  1 day  Timing:  Constant  Progression:  Worsening  Chronicity:  Recurrent  Associated symptoms: myalgias, nausea and vomiting    Associated symptoms: no abdominal pain, no chest pain, no cough, no diarrhea, no ear pain, no fever, no headaches, no rash, no rhinorrhea, no shortness of breath, no sore throat and no wheezing        Prior to Admission Medications   Prescriptions Last Dose Informant Patient Reported? Taking?   busPIRone (BUSPAR) 30 MG tablet   Yes No   Sig: Take 10 mg by mouth 3 (three) times a day   chlordiazePOXIDE (LIBRIUM) 25 mg capsule   No No   Sig: Take 1 capsule (25 mg total) by mouth 3 (three) times a day for 2 days, THEN 1 capsule (25 mg total) 2 (two) times a day for 2 days, THEN 1 capsule (25 mg total) in the morning for 2 days.   escitalopram (Lexapro) 20 mg tablet   No No   Sig: Take 1 tablet (20 mg total) by mouth daily   folic acid (FOLVITE) 1 mg tablet   No No   Sig: Take 1 tablet (1 mg total) by mouth daily Do not start before December 10, 2023.   thiamine 100 MG tablet   No No   Sig: Take 1 tablet (100 mg total) by mouth daily Do not start before December 10, 2023.   traZODone (DESYREL) 50 mg tablet   Yes No   Sig: Take 50 mg by mouth daily at bedtime      Facility-Administered Medications: None        Past Medical History:   Diagnosis Date    Alcohol withdrawal seizure (HCC)     Anxiety     Depression     High triglycerides     Pancreatitis        History reviewed. No pertinent surgical history.    Family History   Problem Relation Age of Onset    Heart disease Mother     Diabetes Mother      I have reviewed and agree with the history as documented.    E-Cigarette/Vaping    E-Cigarette Use Current Every Day User      E-Cigarette/Vaping Substances    Nicotine Yes      Social History     Tobacco Use    Smoking status: Never    Smokeless tobacco: Never   Vaping Use    Vaping status: Every Day    Substances: Nicotine   Substance Use Topics    Alcohol use: Yes     Alcohol/week: 17.0 standard drinks of alcohol     Types: 17 Shots of liquor per week    Drug use: Never       Review of Systems   Constitutional:  Negative for chills and fever.   HENT:  Negative for ear pain, hearing loss, rhinorrhea, sore throat, trouble swallowing and voice change.    Eyes:  Negative for pain and discharge.   Respiratory:  Negative for cough, shortness of breath and wheezing.    Cardiovascular:  Negative for chest pain and palpitations.   Gastrointestinal:  Positive for nausea and vomiting. Negative for abdominal pain, blood in stool, constipation and diarrhea.   Genitourinary:  Negative for dysuria, flank pain, frequency and hematuria.   Musculoskeletal:  Positive for myalgias. Negative for joint swelling, neck pain and neck stiffness.   Skin:  Negative for rash and wound.   Neurological:  Positive for weakness. Negative for dizziness, seizures, syncope, facial asymmetry and headaches.   Psychiatric/Behavioral:  Negative for hallucinations, self-injury and suicidal ideas.    All other systems reviewed and are negative.      Physical Exam  Physical Exam  Vitals and nursing note reviewed.   Constitutional:       General: He is not in acute distress.     Appearance: He is well-developed.   HENT:      Head: Normocephalic and  atraumatic.      Right Ear: External ear normal.      Left Ear: External ear normal.   Eyes:      General: No scleral icterus.        Right eye: No discharge.         Left eye: No discharge.      Extraocular Movements: Extraocular movements intact.      Conjunctiva/sclera: Conjunctivae normal.   Cardiovascular:      Rate and Rhythm: Normal rate and regular rhythm.      Heart sounds: Normal heart sounds. No murmur heard.  Pulmonary:      Effort: Pulmonary effort is normal.      Breath sounds: Normal breath sounds. No wheezing or rales.   Abdominal:      General: Bowel sounds are normal. There is no distension.      Palpations: Abdomen is soft.      Tenderness: There is no abdominal tenderness. There is no guarding or rebound.   Musculoskeletal:         General: No deformity. Normal range of motion.      Cervical back: Normal range of motion and neck supple.   Skin:     General: Skin is warm and dry.      Findings: No rash.   Neurological:      General: No focal deficit present.      Mental Status: He is alert and oriented to person, place, and time.      Cranial Nerves: No cranial nerve deficit.      Comments: Tremors noted.  Piloerection noted.   Psychiatric:         Mood and Affect: Mood normal.         Behavior: Behavior normal.         Thought Content: Thought content normal.         Judgment: Judgment normal.         Vital Signs  ED Triage Vitals   Temperature Pulse Respirations Blood Pressure SpO2   12/21/23 1053 12/21/23 1053 12/21/23 1053 12/21/23 1053 12/21/23 1053   (!) 97.3 °F (36.3 °C) 84 18 132/93 94 %      Temp Source Heart Rate Source Patient Position - Orthostatic VS BP Location FiO2 (%)   12/21/23 1053 12/21/23 1053 12/21/23 1053 12/21/23 1053 --   Temporal Monitor Lying Left arm       Pain Score       12/21/23 1205       No Pain           Vitals:    12/21/23 1215 12/21/23 1230 12/21/23 1300 12/21/23 1308   BP: 145/73 128/86 111/77 120/68   Pulse: 76 79 85 84   Patient Position - Orthostatic VS:  Lying Lying Lying Lying         Visual Acuity      ED Medications  Medications   sodium chloride 0.9 % bolus 2,000 mL (0 mL Intravenous Stopped 12/21/23 1308)   LORazepam (ATIVAN) injection 1 mg (1 mg Intravenous Given 12/21/23 1214)   droperidol (INAPSINE) injection 1.25 mg (1.25 mg Intravenous Given 12/21/23 1212)   diphenhydrAMINE (BENADRYL) injection 12.5 mg (12.5 mg Intravenous Given 12/21/23 1212)   potassium chloride (K-DUR,KLOR-CON) CR tablet 40 mEq (40 mEq Oral Given 12/21/23 1306)       Diagnostic Studies  Results Reviewed       Procedure Component Value Units Date/Time    Lactic acid, plasma (w/reflex if result > 2.0) [[id number]]  (Abnormal) Collected: 12/21/23 1210    Lab Status: Final result Specimen: Blood from Arm, Right Updated: 12/21/23 1248     LACTIC ACID 2.6 mmol/L     Narrative:      Result may be elevated if tourniquet was used during collection.    Protime-INR [[id number]]  (Normal) Collected: 12/21/23 1210    Lab Status: Final result Specimen: Blood from Arm, Right Updated: 12/21/23 1237     Protime 13.8 seconds      INR 1.03    APTT [[id number]]  (Normal) Collected: 12/21/23 1210    Lab Status: Final result Specimen: Blood from Arm, Right Updated: 12/21/23 1237     PTT 30 seconds     Ethanol [[id number]]  (Abnormal) Collected: 12/21/23 1210    Lab Status: Final result Specimen: Blood from Arm, Right Updated: 12/21/23 1237     Ethanol Lvl 134 mg/dL     Comprehensive metabolic panel [[id number]]  (Abnormal) Collected: 12/21/23 1210    Lab Status: Final result Specimen: Blood from Arm, Right Updated: 12/21/23 1237     Sodium 138 mmol/L      Potassium 3.3 mmol/L      Chloride 89 mmol/L      CO2 37 mmol/L      ANION GAP 12 mmol/L      BUN 12 mg/dL      Creatinine 1.32 mg/dL      Glucose 126 mg/dL      Calcium 9.6 mg/dL      AST 29 U/L      ALT 25 U/L      Alkaline Phosphatase 56 U/L      Total Protein 8.1 g/dL      Albumin 5.1 g/dL      Total Bilirubin 1.81 mg/dL      eGFR 71 ml/min/1.73sq m      Narrative:      National [employer] guidelines for Chronic Kidney Disease (CKD):     Stage 1 with normal or high GFR (GFR > 90 mL/min/1.73 square meters)    Stage 2 Mild CKD (GFR = 60-89 mL/min/1.73 square meters)    Stage 3A Moderate CKD (GFR = 45-59 mL/min/1.73 square meters)    Stage 3B Moderate CKD (GFR = 30-44 mL/min/1.73 square meters)    Stage 4 Severe CKD (GFR = 15-29 mL/min/1.73 square meters)    Stage 5 End Stage CKD (GFR <15 mL/min/1.73 square meters)  Note: GFR calculation is accurate only with a steady state creatinine    Magnesium [[id number]]  (Normal) Collected: 12/21/23 1210    Lab Status: Final result Specimen: Blood from Arm, Right Updated: 12/21/23 1237     Magnesium 2.3 mg/dL     Lipase [[id number]]  (Normal) Collected: 12/21/23 1210    Lab Status: Final result Specimen: Blood from Arm, Right Updated: 12/21/23 1237     Lipase 27 u/L     CBC and differential [[id number]]  (Abnormal) Collected: 12/21/23 1210    Lab Status: Final result Specimen: Blood from Arm, Right Updated: 12/21/23 1220     WBC 6.78 Thousand/uL      RBC 5.28 Million/uL      Hemoglobin 16.4 g/dL      Hematocrit 46.9 %      MCV 89 fL      MCH 31.1 pg      MCHC 35.0 g/dL      RDW 12.6 %      MPV 8.4 fL      Platelets 314 Thousands/uL      nRBC 0 /100 WBCs      Neutrophils Relative 62 %      Immat GRANS % 0 %      Lymphocytes Relative 27 %      Monocytes Relative 8 %      Eosinophils Relative 2 %      Basophils Relative 1 %      Neutrophils Absolute 4.20 Thousands/µL      Immature Grans Absolute 0.02 Thousand/uL      Lymphocytes Absolute 1.82 Thousands/µL      Monocytes Absolute 0.56 Thousand/µL      Eosinophils Absolute 0.12 Thousand/µL      Basophils Absolute 0.06 Thousands/µL     Rapid drug screen, urine [[id number]]     Lab Status: No result Specimen: Urine                    No orders to display              Procedures  ECG 12 Lead Documentation Only    Date/Time: 12/21/2023 12:12 PM    Performed by: Moshe DODSON  MD Yessy  Authorized by: Moshe Krishnamurthy MD    ECG reviewed by me, the ED Provider: yes    Patient location:  ED  Rate:     ECG rate:  80  Rhythm:     Rhythm: sinus rhythm    Ectopy:     Ectopy: none    QRS:     QRS axis:  Normal           ED Course  ED Course as of 12/21/23 1455   Thu Dec 21, 2023   1212 Discussed with patient.  Patient does not want transfer to [place] for medical detox.   1241 Patient is calm and cooperative.  Feels better.  Again offered detox.  Does not want transfer.  States he feels comfortable to go home as long as blood work is okay.   1248 Patient's creatinine is slightly elevated compared to last drawn.  CO2 was slightly elevated.  Lactic acid 2.6.  All likely secondary to alcohol withdrawal.  Patient receiving IV fluids.                               SBIRT 20yo+      Flowsheet Row Most Recent Value   Initial Alcohol Screen: US AUDIT-C     1. How often do you have a drink containing alcohol? 6 Filed at: 12/21/2023 1054   2. How many drinks containing alcohol do you have on a typical day you are drinking?  6 Filed at: 12/21/2023 1054   3a. Male UNDER 65: How often do you have five or more drinks on one occasion? 6 Filed at: 12/21/2023 1054   3b. FEMALE Any Age, or MALE 65+: How often do you have 4 or more drinks on one occassion? 0 Filed at: 12/21/2023 1054   Audit-C Score 18 Filed at: 12/21/2023 1054   Full Alcohol Screen: US AUDIT    4. How often during the last year have you found that you were not able to stop drinking once you had started? 3 Filed at: 12/21/2023 1054   5. How often during past year have you failed to do what was normally expected of you because of drinking?  2 Filed at: 12/21/2023 1054   6. How often in past year have you needed a first drink in the morning to get yourself going after a heavy drinking session?  3 Filed at: 12/21/2023 1054   7. How often in past year have you had feeling of guilt or remorse after drinking?  4 Filed at: 12/21/2023 1054   8.  How often in past year have you been unable to remember what happened night before because you had been drinking?  1 Filed at: 12/21/2023 1054   9. Have you or someone else been injured as a result of your drinking?  0 Filed at: 12/21/2023 1054   10. Has a relative, friend, doctor or other health worker been concerned about your drinking and suggested you cut down?  4 Filed at: 12/21/2023 1054   AUDIT Total Score 35 Filed at: 12/21/2023 1054   ANNALISE: How many times in the past year have you...    Used an illegal drug or used a prescription medication for non-medical reasons? Never Filed at: 12/21/2023 1054                      Medical Decision Making  Amount and/or Complexity of Data Reviewed  Labs: ordered.    Risk  Prescription drug management.             Disposition  Final diagnoses:   Alcohol abuse     Time reflects when diagnosis was documented in both MDM as applicable and the Disposition within this note       Time User Action Codes Description Comment    12/21/2023 12:46 PM Moshe Krishnamurthy Add [F10.10] Alcohol abuse           ED Disposition       ED Disposition   Discharge    Condition   Stable    Date/Time   Thu Dec 21, 2023 1246    Comment   Eliud Clarke discharge to home/self care.                   Follow-up Information    None         Discharge Medication List as of 12/21/2023 12:51 PM        START taking these medications    Details   ondansetron (Zofran ODT) 4 mg disintegrating tablet Take 1 tablet (4 mg total) by mouth every 6 (six) hours as needed for nausea or vomiting for up to 10 doses, Starting Thu 12/21/2023, Normal           CONTINUE these medications which have CHANGED    Details   chlordiazePOXIDE (LIBRIUM) 25 mg capsule Take 2 capsules (50 mg total) by mouth 4 (four) times a day as needed for withdrawal for up to 30 doses, Starting Thu 12/21/2023, Normal           CONTINUE these medications which have NOT CHANGED    Details   busPIRone (BUSPAR) 30 MG tablet Take 10 mg by mouth 3  (three) times a day, Historical Med      escitalopram (Lexapro) 20 mg tablet Take 1 tablet (20 mg total) by mouth daily, Starting Tue 10/19/2021, Normal      folic acid (FOLVITE) 1 mg tablet Take 1 tablet (1 mg total) by mouth daily Do not start before December 10, 2023., Starting Sun 12/10/2023, Until Tue 1/9/2024, Normal      thiamine 100 MG tablet Take 1 tablet (100 mg total) by mouth daily Do not start before December 10, 2023., Starting Sun 12/10/2023, Until Tue 1/9/2024, Normal      traZODone (DESYREL) 50 mg tablet Take 50 mg by mouth daily at bedtime, Historical Med             No discharge procedures on file.    PDMP Review         Value Time User    PDMP Reviewed  Yes 12/9/2023 10:17 AM Godwin Valdez MD            ED Provider  Electronically Signed by             Moshe Krishnamurthy MD  12/21/23 7709       Moshe Krishnamurthy MD  12/21/23 5960

## 2023-12-21 NOTE — ED NOTES
Family at bedside. Belongings and body searched at this time with parents at bedside. NO ETOH present in room.     Patty Fernandez RN  12/21/23 5837     Additional Notes: Patient consent was obtained to proceed with the visit and recommended plan of care after discussion of all risks and benefits, including the risks of COVID-19 exposure. Detail Level: Simple

## 2024-01-09 ENCOUNTER — OFFICE VISIT (OUTPATIENT)
Dept: PRIMARY CARE | Facility: CLINIC | Age: 34
End: 2024-01-09
Payer: COMMERCIAL

## 2024-01-09 VITALS
WEIGHT: 186 LBS | HEART RATE: 80 BPM | RESPIRATION RATE: 18 BRPM | HEIGHT: 71 IN | SYSTOLIC BLOOD PRESSURE: 128 MMHG | DIASTOLIC BLOOD PRESSURE: 80 MMHG | BODY MASS INDEX: 26.04 KG/M2 | OXYGEN SATURATION: 99 %

## 2024-01-09 DIAGNOSIS — F10.930 ALCOHOL WITHDRAWAL SEIZURE WITHOUT COMPLICATION (CMS/HCC): ICD-10-CM

## 2024-01-09 DIAGNOSIS — R56.9 ALCOHOL WITHDRAWAL SEIZURE WITHOUT COMPLICATION (CMS/HCC): ICD-10-CM

## 2024-01-09 DIAGNOSIS — F41.9 ANXIETY AND DEPRESSION: ICD-10-CM

## 2024-01-09 DIAGNOSIS — F32.A ANXIETY AND DEPRESSION: ICD-10-CM

## 2024-01-09 DIAGNOSIS — F10.21: Primary | ICD-10-CM

## 2024-01-09 PROCEDURE — 3008F BODY MASS INDEX DOCD: CPT | Performed by: FAMILY MEDICINE

## 2024-01-09 PROCEDURE — 99213 OFFICE O/P EST LOW 20 MIN: CPT | Performed by: FAMILY MEDICINE

## 2024-01-09 RX ORDER — BUSPIRONE HYDROCHLORIDE 10 MG/1
10 TABLET ORAL 3 TIMES DAILY
Qty: 270 TABLET | Refills: 1 | Status: SHIPPED | OUTPATIENT
Start: 2024-01-09 | End: 2024-04-10 | Stop reason: SDUPTHER

## 2024-01-09 ASSESSMENT — ENCOUNTER SYMPTOMS
ALLERGIC/IMMUNOLOGIC NEGATIVE: 1
CONSTITUTIONAL NEGATIVE: 1
ENDOCRINE NEGATIVE: 1
NEUROLOGICAL NEGATIVE: 1
HEMATOLOGIC/LYMPHATIC NEGATIVE: 1
GASTROINTESTINAL NEGATIVE: 1
EYES NEGATIVE: 1
MUSCULOSKELETAL NEGATIVE: 1
SEIZURES: 0
CARDIOVASCULAR NEGATIVE: 1
PSYCHIATRIC NEGATIVE: 1
RESPIRATORY NEGATIVE: 1

## 2024-01-09 NOTE — ASSESSMENT & PLAN NOTE
He is in early remission at this time and has continued to go to AA meetings and has also picked up a sponsor and has been working with him.  90 days without a drink, I did congratulate him on this and I also given a referral to see psychiatry.

## 2024-01-09 NOTE — PROGRESS NOTES
"Subjective      Patient ID: Jeff Hobbs is a 33 y.o. male     HPI:  Follow up.  Recent recurrence of drinking, with hospitalizations on 10/28 and December 7.  Had last drink on 12/20.  He is doing AA meetings, and is trying to engage with a counselor.  He is unable to do IOP treatment.  Would like to find a psychiatrist.  19 days of sobriety so far.    The following have been reviewed and updated as appropriate in this visit:   Tobacco  Allergies  Meds  Problems  Med Hx  Surg Hx  Fam Hx       Review of Systems   Constitutional: Negative.    HENT: Negative.    Eyes: Negative.    Respiratory: Negative.    Cardiovascular: Negative.    Gastrointestinal: Negative.    Endocrine: Negative.    Genitourinary: Negative.    Musculoskeletal: Negative.    Skin: Negative.    Allergic/Immunologic: Negative.    Neurological: Negative.  Negative for seizures.   Hematological: Negative.    Psychiatric/Behavioral: Negative.    All other systems reviewed and are negative.    Vitals:    01/09/24 1345   BP: 128/80   BP Location: Left upper arm   Patient Position: Sitting   Pulse: 80   Resp: 18   SpO2: 99%   Weight: 84.4 kg (186 lb)   Height: 1.803 m (5' 11\")      Current Outpatient Medications   Medication Sig Dispense Refill   • busPIRone (BUSPAR) 10 mg tablet Take 1 tablet (10 mg total) by mouth 3 (three) times a day. 270 tablet 1   • calcium carbonate (TUMS) 200 mg calcium (500 mg) chewable tablet Take 500 mg by mouth.     • escitalopram (LEXAPRO) 20 mg tablet Take 1 tablet (20 mg total) by mouth daily. 90 tablet 1   • melatonin 5 mg capsule Take 5 mg by mouth nightly.     • multivitamin with minerals tablet Take 1 tablet by mouth daily.     • thiamine 100 mg tablet Take 100 mg by mouth daily.     • traZODone (DESYREL) 50 mg tablet Take 1 tablet (50 mg total) by mouth nightly as needed (insomnia). 90 tablet 1     No current facility-administered medications for this visit.      Social History     Tobacco Use   • Smoking " status: Every Day     Types: Electronic Cigarette   • Smokeless tobacco: Never   Vaping Use   • Vaping Use: Never used   Substance Use Topics   • Alcohol use: Yes     Comment: His girlfriend reports that if he does not have some alcoholic beverages especially in the evening he cannot sleep, 4 handles of vodka in past week   • Drug use: Never      Family History   Problem Relation Age of Onset   • Diabetes Biological Mother    • Heart disease Biological Mother    • No Known Problems Biological Father    • No Known Problems Biological Brother    • Heart disease Maternal Grandmother         Past Medical History:   Diagnosis Date   • Alcoholism (CMS/HCC)         Objective     Physical Exam  Vitals reviewed.   Constitutional:       Appearance: Normal appearance. He is normal weight.   Cardiovascular:      Rate and Rhythm: Normal rate and regular rhythm.      Heart sounds: Normal heart sounds.   Pulmonary:      Effort: Pulmonary effort is normal.      Breath sounds: Normal breath sounds.   Skin:     General: Skin is warm.   Neurological:      Mental Status: He is alert and oriented to person, place, and time.   Psychiatric:         Mood and Affect: Mood normal.         Problem List Items Addressed This Visit        Nervous    Alcohol withdrawal seizure (CMS/[facility])     He has not had any seizures related to alcohol withdrawal since December 2022 and he did experience a setback in December but has been sober since December 20, 2023.  He is 19 days without a drink.         Relevant Orders    Ambulatory referral to Psychiatry       Mental Health    Anxiety and depression    Relevant Medications    busPIRone (BUSPAR) 10 mg tablet    Other Relevant Orders    Ambulatory referral to Psychiatry       Other    Severe alcohol dependence in early remission (CMS/HCC) - Primary     He is in early remission at this time and has continued to go to AA meetings and has also picked up a sponsor and has been working with him.  90 days without  a drink, I did congratulate him on this and I also given a referral to see psychiatry.         Relevant Orders    Ambulatory referral to Psychiatry       Assessment/Plan

## 2024-01-09 NOTE — ASSESSMENT & PLAN NOTE
He has not had any seizures related to alcohol withdrawal since December 2022 and he did experience a setback in December but has been sober since December 20, 2023.  He is 19 days without a drink.

## 2024-02-01 ENCOUNTER — HOSPITAL ENCOUNTER (EMERGENCY)
Facility: HOSPITAL | Age: 34
Discharge: HOME | End: 2024-02-01
Attending: EMERGENCY MEDICINE | Admitting: EMERGENCY MEDICINE
Payer: COMMERCIAL

## 2024-02-01 VITALS
RESPIRATION RATE: 16 BRPM | HEART RATE: 76 BPM | SYSTOLIC BLOOD PRESSURE: 124 MMHG | WEIGHT: 196.6 LBS | BODY MASS INDEX: 27.42 KG/M2 | DIASTOLIC BLOOD PRESSURE: 77 MMHG | OXYGEN SATURATION: 97 % | TEMPERATURE: 97.7 F

## 2024-02-01 DIAGNOSIS — F10.930 ALCOHOL WITHDRAWAL SYNDROME WITHOUT COMPLICATION (HCC): ICD-10-CM

## 2024-02-01 DIAGNOSIS — F10.10 ALCOHOL ABUSE: Primary | ICD-10-CM

## 2024-02-01 LAB
ALBUMIN SERPL BCP-MCNC: 4.9 G/DL (ref 3.5–5)
ALP SERPL-CCNC: 44 U/L (ref 34–104)
ALT SERPL W P-5'-P-CCNC: 30 U/L (ref 7–52)
AMPHETAMINES SERPL QL SCN: NEGATIVE
ANION GAP SERPL CALCULATED.3IONS-SCNC: 14 MMOL/L
APTT PPP: 29 SECONDS (ref 23–37)
AST SERPL W P-5'-P-CCNC: 29 U/L (ref 13–39)
BARBITURATES UR QL: NEGATIVE
BASOPHILS # BLD MANUAL: 0.05 THOUSAND/UL (ref 0–0.1)
BASOPHILS NFR MAR MANUAL: 1 % (ref 0–1)
BENZODIAZ UR QL: NEGATIVE
BILIRUB SERPL-MCNC: 1.3 MG/DL (ref 0.2–1)
BUN SERPL-MCNC: 10 MG/DL (ref 5–25)
CALCIUM SERPL-MCNC: 9.4 MG/DL (ref 8.4–10.2)
CHLORIDE SERPL-SCNC: 95 MMOL/L (ref 96–108)
CO2 SERPL-SCNC: 26 MMOL/L (ref 21–32)
COCAINE UR QL: NEGATIVE
CREAT SERPL-MCNC: 0.97 MG/DL (ref 0.6–1.3)
CRP SERPL HS-MCNC: <0.2 MG/L
EOSINOPHIL # BLD MANUAL: 0 THOUSAND/UL (ref 0–0.4)
EOSINOPHIL NFR BLD MANUAL: 0 % (ref 0–6)
ERYTHROCYTE [DISTWIDTH] IN BLOOD BY AUTOMATED COUNT: 11.9 % (ref 11.6–15.1)
ETHANOL SERPL-MCNC: 66 MG/DL
GFR SERPL CREATININE-BSD FRML MDRD: 103 ML/MIN/1.73SQ M
GLUCOSE SERPL-MCNC: 101 MG/DL (ref 65–140)
HCT VFR BLD AUTO: 40.9 % (ref 36.5–49.3)
HGB BLD-MCNC: 14.8 G/DL (ref 12–17)
INR PPP: 1.04 (ref 0.84–1.19)
LIPASE SERPL-CCNC: 25 U/L (ref 11–82)
LYMPHOCYTES # BLD AUTO: 2.24 THOUSAND/UL (ref 0.6–4.47)
LYMPHOCYTES # BLD AUTO: 42 % (ref 14–44)
MAGNESIUM SERPL-MCNC: 1.8 MG/DL (ref 1.9–2.7)
MCH RBC QN AUTO: 31.3 PG (ref 26.8–34.3)
MCHC RBC AUTO-ENTMCNC: 36.2 G/DL (ref 31.4–37.4)
MCV RBC AUTO: 87 FL (ref 82–98)
METHADONE UR QL: NEGATIVE
MONOCYTES # BLD AUTO: 0.24 THOUSAND/UL (ref 0–1.22)
MONOCYTES NFR BLD: 5 % (ref 4–12)
NEUTROPHILS # BLD MANUAL: 2.34 THOUSAND/UL (ref 1.85–7.62)
NEUTS SEG NFR BLD AUTO: 48 % (ref 43–75)
OPIATES UR QL SCN: NEGATIVE
OXYCODONE+OXYMORPHONE UR QL SCN: NEGATIVE
PCP UR QL: NEGATIVE
PLATELET # BLD AUTO: 227 THOUSANDS/UL (ref 149–390)
PLATELET BLD QL SMEAR: ADEQUATE
PMV BLD AUTO: 8.3 FL (ref 8.9–12.7)
POTASSIUM SERPL-SCNC: 3.7 MMOL/L (ref 3.5–5.3)
PROT SERPL-MCNC: 7.6 G/DL (ref 6.4–8.4)
PROTHROMBIN TIME: 13.9 SECONDS (ref 11.6–14.5)
RBC # BLD AUTO: 4.73 MILLION/UL (ref 3.88–5.62)
RBC MORPH BLD: NORMAL
SODIUM SERPL-SCNC: 135 MMOL/L (ref 135–147)
THC UR QL: NEGATIVE
VARIANT LYMPHS # BLD AUTO: 4 %
WBC # BLD AUTO: 4.87 THOUSAND/UL (ref 4.31–10.16)

## 2024-02-01 PROCEDURE — 85007 BL SMEAR W/DIFF WBC COUNT: CPT | Performed by: EMERGENCY MEDICINE

## 2024-02-01 PROCEDURE — 36415 COLL VENOUS BLD VENIPUNCTURE: CPT | Performed by: EMERGENCY MEDICINE

## 2024-02-01 PROCEDURE — 83735 ASSAY OF MAGNESIUM: CPT | Performed by: EMERGENCY MEDICINE

## 2024-02-01 PROCEDURE — 85610 PROTHROMBIN TIME: CPT | Performed by: EMERGENCY MEDICINE

## 2024-02-01 PROCEDURE — 96375 TX/PRO/DX INJ NEW DRUG ADDON: CPT

## 2024-02-01 PROCEDURE — 96361 HYDRATE IV INFUSION ADD-ON: CPT

## 2024-02-01 PROCEDURE — 83690 ASSAY OF LIPASE: CPT | Performed by: EMERGENCY MEDICINE

## 2024-02-01 PROCEDURE — 85027 COMPLETE CBC AUTOMATED: CPT | Performed by: EMERGENCY MEDICINE

## 2024-02-01 PROCEDURE — 80053 COMPREHEN METABOLIC PANEL: CPT | Performed by: EMERGENCY MEDICINE

## 2024-02-01 PROCEDURE — 99285 EMERGENCY DEPT VISIT HI MDM: CPT

## 2024-02-01 PROCEDURE — 86141 C-REACTIVE PROTEIN HS: CPT | Performed by: EMERGENCY MEDICINE

## 2024-02-01 PROCEDURE — 96374 THER/PROPH/DIAG INJ IV PUSH: CPT

## 2024-02-01 PROCEDURE — 85730 THROMBOPLASTIN TIME PARTIAL: CPT | Performed by: EMERGENCY MEDICINE

## 2024-02-01 PROCEDURE — 82077 ASSAY SPEC XCP UR&BREATH IA: CPT | Performed by: EMERGENCY MEDICINE

## 2024-02-01 PROCEDURE — 80307 DRUG TEST PRSMV CHEM ANLYZR: CPT | Performed by: EMERGENCY MEDICINE

## 2024-02-01 PROCEDURE — 99284 EMERGENCY DEPT VISIT MOD MDM: CPT | Performed by: EMERGENCY MEDICINE

## 2024-02-01 RX ORDER — LORAZEPAM 2 MG/ML
1 INJECTION INTRAMUSCULAR ONCE
Status: COMPLETED | OUTPATIENT
Start: 2024-02-01 | End: 2024-02-01

## 2024-02-01 RX ORDER — CHLORDIAZEPOXIDE HYDROCHLORIDE 25 MG/1
50 CAPSULE, GELATIN COATED ORAL 4 TIMES DAILY PRN
Qty: 30 CAPSULE | Refills: 0 | Status: SHIPPED | OUTPATIENT
Start: 2024-02-01 | End: 2024-02-02

## 2024-02-01 RX ORDER — DROPERIDOL 2.5 MG/ML
1.25 INJECTION, SOLUTION INTRAMUSCULAR; INTRAVENOUS ONCE
Status: COMPLETED | OUTPATIENT
Start: 2024-02-01 | End: 2024-02-01

## 2024-02-01 RX ORDER — DIPHENHYDRAMINE HYDROCHLORIDE 50 MG/ML
12.5 INJECTION INTRAMUSCULAR; INTRAVENOUS ONCE
Status: COMPLETED | OUTPATIENT
Start: 2024-02-01 | End: 2024-02-01

## 2024-02-01 RX ADMIN — DROPERIDOL 1.25 MG: 2.5 INJECTION, SOLUTION INTRAMUSCULAR; INTRAVENOUS at 18:03

## 2024-02-01 RX ADMIN — DIPHENHYDRAMINE HYDROCHLORIDE 12.5 MG: 50 INJECTION, SOLUTION INTRAMUSCULAR; INTRAVENOUS at 18:03

## 2024-02-01 RX ADMIN — SODIUM CHLORIDE 1000 ML: 0.9 INJECTION, SOLUTION INTRAVENOUS at 18:03

## 2024-02-01 RX ADMIN — LORAZEPAM 1 MG: 2 INJECTION INTRAMUSCULAR; INTRAVENOUS at 18:03

## 2024-02-01 NOTE — ED PROVIDER NOTES
History  Chief Complaint   Patient presents with    Withdrawal - Alcohol     Binge drinking started last Saturday, last drink was last night.      Patient was seen here in December for the same.  Started binge drinking 5 days ago.  Drinking 2 handles of vodka a day.  Last drink was last night.  Denies any alcohol use today.  Denies any drug use.  Feels weak and shaky.  Had nausea vomiting.      History provided by:  Patient   used: No    Withdrawal - Alcohol  Similar prior episodes: yes    Severity:  Mild  Onset quality:  Gradual  Duration:  1 day  Timing:  Constant  Progression:  Worsening  Chronicity:  Recurrent  Suspected agents:  Alcohol  Associated symptoms: nausea, vomiting and weakness    Associated symptoms: no abdominal pain, no hallucinations, no headaches, no palpitations, no seizures, no shortness of breath and no suicidal ideation        Prior to Admission Medications   Prescriptions Last Dose Informant Patient Reported? Taking?   busPIRone (BUSPAR) 30 MG tablet   Yes No   Sig: Take 10 mg by mouth 3 (three) times a day   chlordiazePOXIDE (LIBRIUM) 25 mg capsule   No No   Sig: Take 1 capsule (25 mg total) by mouth 3 (three) times a day for 2 days, THEN 1 capsule (25 mg total) 2 (two) times a day for 2 days, THEN 1 capsule (25 mg total) in the morning for 2 days.   chlordiazePOXIDE (LIBRIUM) 25 mg capsule   No No   Sig: Take 2 capsules (50 mg total) by mouth 4 (four) times a day as needed for withdrawal for up to 30 doses   escitalopram (Lexapro) 20 mg tablet   No No   Sig: Take 1 tablet (20 mg total) by mouth daily   folic acid (FOLVITE) 1 mg tablet   No No   Sig: Take 1 tablet (1 mg total) by mouth daily Do not start before December 10, 2023.   ondansetron (Zofran ODT) 4 mg disintegrating tablet   No No   Sig: Take 1 tablet (4 mg total) by mouth every 6 (six) hours as needed for nausea or vomiting for up to 10 doses   thiamine 100 MG tablet   No No   Sig: Take 1 tablet (100 mg total)  by mouth daily Do not start before December 10, 2023.   traZODone (DESYREL) 50 mg tablet   Yes No   Sig: Take 50 mg by mouth daily at bedtime      Facility-Administered Medications: None       Past Medical History:   Diagnosis Date    Alcohol withdrawal seizure (HCC)     Anxiety     Depression     High triglycerides     Pancreatitis        History reviewed. No pertinent surgical history.    Family History   Problem Relation Age of Onset    Heart disease Mother     Diabetes Mother      I have reviewed and agree with the history as documented.    E-Cigarette/Vaping    E-Cigarette Use Current Every Day User      E-Cigarette/Vaping Substances    Nicotine Yes      Social History     Tobacco Use    Smoking status: Never    Smokeless tobacco: Never   Vaping Use    Vaping status: Every Day    Substances: Nicotine   Substance Use Topics    Alcohol use: Yes     Alcohol/week: 17.0 standard drinks of alcohol     Types: 17 Shots of liquor per week    Drug use: Never       Review of Systems   Constitutional:  Negative for chills and fever.   HENT:  Negative for ear pain, hearing loss, sore throat, trouble swallowing and voice change.    Eyes:  Negative for pain and discharge.   Respiratory:  Negative for cough, shortness of breath and wheezing.    Cardiovascular:  Negative for chest pain and palpitations.   Gastrointestinal:  Positive for nausea and vomiting. Negative for abdominal pain, blood in stool, constipation and diarrhea.   Genitourinary:  Negative for dysuria, flank pain, frequency and hematuria.   Musculoskeletal:  Negative for joint swelling, neck pain and neck stiffness.   Skin:  Negative for rash and wound.   Neurological:  Positive for weakness. Negative for dizziness, seizures, syncope, facial asymmetry and headaches.   Psychiatric/Behavioral:  Negative for hallucinations, self-injury and suicidal ideas.    All other systems reviewed and are negative.      Physical Exam  Physical Exam  Vitals and nursing note  reviewed.   Constitutional:       General: He is not in acute distress.     Appearance: He is well-developed.   HENT:      Head: Normocephalic and atraumatic.      Right Ear: External ear normal.      Left Ear: External ear normal.   Eyes:      General: No scleral icterus.        Right eye: No discharge.         Left eye: No discharge.      Extraocular Movements: Extraocular movements intact.      Conjunctiva/sclera: Conjunctivae normal.   Cardiovascular:      Rate and Rhythm: Normal rate and regular rhythm.      Heart sounds: Normal heart sounds. No murmur heard.  Pulmonary:      Effort: Pulmonary effort is normal.      Breath sounds: Normal breath sounds. No wheezing or rales.   Abdominal:      General: Bowel sounds are normal. There is no distension.      Palpations: Abdomen is soft.      Tenderness: There is no abdominal tenderness. There is no guarding or rebound.   Musculoskeletal:         General: No deformity. Normal range of motion.      Cervical back: Normal range of motion and neck supple.   Skin:     General: Skin is warm and dry.      Findings: No rash.   Neurological:      General: No focal deficit present.      Mental Status: He is alert and oriented to person, place, and time.      Cranial Nerves: No cranial nerve deficit.   Psychiatric:         Mood and Affect: Mood normal.         Behavior: Behavior normal.         Thought Content: Thought content normal.         Judgment: Judgment normal.         Vital Signs  ED Triage Vitals [02/01/24 1737]   Temperature Pulse Respirations Blood Pressure SpO2   97.7 °F (36.5 °C) 90 18 138/94 95 %      Temp Source Heart Rate Source Patient Position - Orthostatic VS BP Location FiO2 (%)   Temporal Monitor Sitting Left arm --      Pain Score       4           Vitals:    02/01/24 1737   BP: 138/94   Pulse: 90   Patient Position - Orthostatic VS: Sitting         Visual Acuity      ED Medications  Medications   sodium chloride 0.9 % bolus 1,000 mL (1,000 mL Intravenous  New Bag 2/1/24 1803)   LORazepam (ATIVAN) injection 1 mg (1 mg Intravenous Given 2/1/24 1803)   droperidol (INAPSINE) injection 1.25 mg (1.25 mg Intravenous Given 2/1/24 1803)   diphenhydrAMINE (BENADRYL) injection 12.5 mg (12.5 mg Intravenous Given 2/1/24 1803)       Diagnostic Studies  Results Reviewed       Procedure Component Value Units Date/Time    Comprehensive metabolic panel [[id number]]  (Abnormal) Collected: 02/01/24 1759    Lab Status: Final result Specimen: Blood from Arm, Right Updated: 02/01/24 1826     Sodium 135 mmol/L      Potassium 3.7 mmol/L      Chloride 95 mmol/L      CO2 26 mmol/L      ANION GAP 14 mmol/L      BUN 10 mg/dL      Creatinine 0.97 mg/dL      Glucose 101 mg/dL      Calcium 9.4 mg/dL      AST 29 U/L      ALT 30 U/L      Alkaline Phosphatase 44 U/L      Total Protein 7.6 g/dL      Albumin 4.9 g/dL      Total Bilirubin 1.30 mg/dL      eGFR 103 ml/min/1.73sq m     Narrative:      [employer] guidelines for Chronic Kidney Disease (CKD):     Stage 1 with normal or high GFR (GFR > 90 mL/min/1.73 square meters)    Stage 2 Mild CKD (GFR = 60-89 mL/min/1.73 square meters)    Stage 3A Moderate CKD (GFR = 45-59 mL/min/1.73 square meters)    Stage 3B Moderate CKD (GFR = 30-44 mL/min/1.73 square meters)    Stage 4 Severe CKD (GFR = 15-29 mL/min/1.73 square meters)    Stage 5 End Stage CKD (GFR <15 mL/min/1.73 square meters)  Note: GFR calculation is accurate only with a steady state creatinine    Magnesium [[id number]]  (Abnormal) Collected: 02/01/24 1759    Lab Status: Final result Specimen: Blood from Arm, Right Updated: 02/01/24 1826     Magnesium 1.8 mg/dL     Lipase [[id number]]  (Normal) Collected: 02/01/24 1759    Lab Status: Final result Specimen: Blood from Arm, Right Updated: 02/01/24 1826     Lipase 25 u/L     Ethanol [[id number]]  (Abnormal) Collected: 02/01/24 1759    Lab Status: Final result Specimen: Blood from Arm, Right Updated: 02/01/24 1825     Ethanol Lvl  66 mg/dL     Rapid drug screen, urine [[id number]]  (Normal) Collected: 02/01/24 1759    Lab Status: Final result Specimen: Urine, Clean Catch Updated: 02/01/24 1825     Amph/Meth UR Negative     Barbiturate Ur Negative     Benzodiazepine Urine Negative     Cocaine Urine Negative     Methadone Urine Negative     Opiate Urine Negative     PCP Ur Negative     THC Urine Negative     Oxycodone Urine Negative    Narrative:      FOR MEDICAL PURPOSES ONLY.   IF CONFIRMATION NEEDED PLEASE CONTACT THE LAB WITHIN 5 DAYS.    Drug Screen Cutoff Levels:  AMPHETAMINE/METHAMPHETAMINES  1000 ng/mL  BARBITURATES     200 ng/mL  BENZODIAZEPINES     200 ng/mL  COCAINE      300 ng/mL  METHADONE      300 ng/mL  OPIATES      300 ng/mL  PHENCYCLIDINE     25 ng/mL  THC       50 ng/mL  OXYCODONE      100 ng/mL    Protime-INR [[id number]]  (Normal) Collected: 02/01/24 1759    Lab Status: Final result Specimen: Blood from Arm, Right Updated: 02/01/24 1822     Protime 13.9 seconds      INR 1.04    APTT [[id number]]  (Normal) Collected: 02/01/24 1759    Lab Status: Final result Specimen: Blood from Arm, Right Updated: 02/01/24 1822     PTT 29 seconds     CBC and differential [[id number]]  (Abnormal) Collected: 02/01/24 1759    Lab Status: Final result Specimen: Blood from Arm, Right Updated: 02/01/24 1810     WBC 4.87 Thousand/uL      RBC 4.73 Million/uL      Hemoglobin 14.8 g/dL      Hematocrit 40.9 %      MCV 87 fL      MCH 31.3 pg      MCHC 36.2 g/dL      RDW 11.9 %      MPV 8.3 fL      Platelets 227 Thousands/uL     Manual Differential(PHLEBS Do Not Order) [[id number]] Collected: 02/01/24 1759    Lab Status: In process Specimen: Blood from Arm, Right Updated: 02/01/24 1810    High sensitivity CRP [[id number]] Collected: 02/01/24 1759    Lab Status: In process Specimen: Blood from Arm, Right Updated: 02/01/24 1802                   No orders to display              Procedures  Procedures         ED Course  ED Course as of 02/01/24 1847   Thu Feb  01, 2024 1751 Patient offered detox at [facility].  Refuses.  Offered to contact [facility].  Refuses.  Denies any suicidal homicidal ideation.   1844 Patient seen.  Feels better.  Wants to go home.                               SBIRT 20yo+      Flowsheet Row Most Recent Value   Initial Alcohol Screen: US AUDIT-C     1. How often do you have a drink containing alcohol? 6  [for past 5 days- in 5 days drank 2 large bottle of vodka] Filed at: 02/01/2024 1740   2. How many drinks containing alcohol do you have on a typical day you are drinking?  6 Filed at: 02/01/2024 1740   3a. Male UNDER 65: How often do you have five or more drinks on one occasion? 6 Filed at: 02/01/2024 1740   Audit-C Score 18 Filed at: 02/01/2024 1740   Full Alcohol Screen: US AUDIT    4. How often during the last year have you found that you were not able to stop drinking once you had started? 2 Filed at: 02/01/2024 1740   5. How often during past year have you failed to do what was normally expected of you because of drinking?  2 Filed at: 02/01/2024 1740   6. How often in past year have you needed a first drink in the morning to get yourself going after a heavy drinking session?  2 Filed at: 02/01/2024 1740   7. How often in past year have you had feeling of guilt or remorse after drinking?  2 Filed at: 02/01/2024 1740   8. How often in past year have you been unable to remember what happened night before because you had been drinking?  2 Filed at: 02/01/2024 1740   9. Have you or someone else been injured as a result of your drinking?  0 Filed at: 02/01/2024 1740   10. Has a relative, friend, doctor or other health worker been concerned about your drinking and suggested you cut down?  4 Filed at: 02/01/2024 1740   AUDIT Total Score 32 Filed at: 02/01/2024 1740   ANNALISE: How many times in the past year have you...    Used an illegal drug or used a prescription medication for non-medical reasons? Never Filed at: 02/01/2024 1740                       Medical Decision Making  Amount and/or Complexity of Data Reviewed  Labs: ordered.    Risk  Prescription drug management.             Disposition  Final diagnoses:   Alcohol abuse   Alcohol withdrawal syndrome without complication (HCC)     Time reflects when diagnosis was documented in both MDM as applicable and the Disposition within this note       Time User Action Codes Description Comment    2/1/2024  5:58 PM Moshe Krishnamurthy [F10.10] Alcohol abuse     2/1/2024  5:58 PM Moshe Krishnamurthy Add [F10.930] Alcohol withdrawal syndrome without complication (HCC)           ED Disposition       ED Disposition   Discharge    Condition   Stable    Date/Time   Thu Feb 1, 2024 1844    Comment   Eliud Ricciconathan discharge to home/self care.                   Follow-up Information    None         Patient's Medications   Discharge Prescriptions    CHLORDIAZEPOXIDE (LIBRIUM) 25 MG CAPSULE    Take 2 capsules (50 mg total) by mouth 4 (four) times a day as needed for withdrawal       Start Date: 2/1/2024  End Date: 3/2/2024       Order Dose: 50 mg       Quantity: 30 capsule    Refills: 0           PDMP Review         Value Time User    PDMP Reviewed  Yes 12/9/2023 10:17 AM Godwin Valdez MD            ED Provider  Electronically Signed by             Moshe Krishnamurthy MD  02/01/24 [id number]

## 2024-02-01 NOTE — Clinical Note
Eliud Clarke was seen and treated in our emergency department on 2/1/2024.                Diagnosis:     Eliud  may return to work on return date.    He may return on this date: 02/03/2024         If you have any questions or concerns, please don't hesitate to call.      Moshe Krishnamurthy MD    ______________________________           _______________          _______________  Hospital Representative                              Date                                Time

## 2024-02-02 RX ORDER — CHLORDIAZEPOXIDE HYDROCHLORIDE 25 MG/1
50 CAPSULE, GELATIN COATED ORAL 4 TIMES DAILY PRN
Qty: 30 CAPSULE | Refills: 0 | Status: SHIPPED | OUTPATIENT
Start: 2024-02-02 | End: 2024-02-12

## 2024-02-18 ENCOUNTER — APPOINTMENT (EMERGENCY)
Dept: CT IMAGING | Facility: HOSPITAL | Age: 34
End: 2024-02-18
Payer: COMMERCIAL

## 2024-02-18 ENCOUNTER — HOSPITAL ENCOUNTER (EMERGENCY)
Facility: HOSPITAL | Age: 34
Discharge: HOME | End: 2024-02-18
Attending: EMERGENCY MEDICINE
Payer: COMMERCIAL

## 2024-02-18 VITALS
RESPIRATION RATE: 14 BRPM | OXYGEN SATURATION: 97 % | TEMPERATURE: 98 F | BODY MASS INDEX: 27.13 KG/M2 | DIASTOLIC BLOOD PRESSURE: 75 MMHG | WEIGHT: 193.78 LBS | HEIGHT: 71 IN | HEART RATE: 71 BPM | SYSTOLIC BLOOD PRESSURE: 110 MMHG

## 2024-02-18 DIAGNOSIS — F10.939 ALCOHOL WITHDRAWAL (HCC): Primary | ICD-10-CM

## 2024-02-18 LAB
ALBUMIN SERPL BCP-MCNC: 4.5 G/DL (ref 3.5–5)
ALP SERPL-CCNC: 53 U/L (ref 34–104)
ALT SERPL W P-5'-P-CCNC: 87 U/L (ref 7–52)
ANION GAP SERPL CALCULATED.3IONS-SCNC: 9 MMOL/L
AST SERPL W P-5'-P-CCNC: 63 U/L (ref 13–39)
BASOPHILS # BLD MANUAL: 0.08 THOUSAND/UL (ref 0–0.1)
BASOPHILS NFR MAR MANUAL: 2 % (ref 0–1)
BILIRUB SERPL-MCNC: 1.22 MG/DL (ref 0.2–1)
BUN SERPL-MCNC: 15 MG/DL (ref 5–25)
CALCIUM SERPL-MCNC: 8.8 MG/DL (ref 8.4–10.2)
CHLORIDE SERPL-SCNC: 98 MMOL/L (ref 96–108)
CO2 SERPL-SCNC: 28 MMOL/L (ref 21–32)
CREAT SERPL-MCNC: 1.01 MG/DL (ref 0.6–1.3)
EOSINOPHIL # BLD MANUAL: 0.04 THOUSAND/UL (ref 0–0.4)
EOSINOPHIL NFR BLD MANUAL: 1 % (ref 0–6)
ERYTHROCYTE [DISTWIDTH] IN BLOOD BY AUTOMATED COUNT: 12.9 % (ref 11.6–15.1)
ETHANOL SERPL-MCNC: 75 MG/DL
GFR SERPL CREATININE-BSD FRML MDRD: 98 ML/MIN/1.73SQ M
GLUCOSE SERPL-MCNC: 96 MG/DL (ref 65–140)
HCT VFR BLD AUTO: 42.8 % (ref 36.5–49.3)
HGB BLD-MCNC: 15.3 G/DL (ref 12–17)
HYPERCHROMIA BLD QL SMEAR: PRESENT
LACTATE SERPL-SCNC: 1.7 MMOL/L (ref 0.5–2)
LG PLATELETS BLD QL SMEAR: PRESENT
LIPASE SERPL-CCNC: 98 U/L (ref 11–82)
LYMPHOCYTES # BLD AUTO: 1.26 THOUSAND/UL (ref 0.6–4.47)
LYMPHOCYTES # BLD AUTO: 25 % (ref 14–44)
MCH RBC QN AUTO: 31.2 PG (ref 26.8–34.3)
MCHC RBC AUTO-ENTMCNC: 35.7 G/DL (ref 31.4–37.4)
MCV RBC AUTO: 87 FL (ref 82–98)
MONOCYTES # BLD AUTO: 0.27 THOUSAND/UL (ref 0–1.22)
MONOCYTES NFR BLD: 7 % (ref 4–12)
NEUTROPHILS # BLD MANUAL: 2.18 THOUSAND/UL (ref 1.85–7.62)
NEUTS BAND NFR BLD MANUAL: 1 % (ref 0–8)
NEUTS SEG NFR BLD AUTO: 56 % (ref 43–75)
PLATELET # BLD AUTO: 217 THOUSANDS/UL (ref 149–390)
PLATELET BLD QL SMEAR: ADEQUATE
PMV BLD AUTO: 8.8 FL (ref 8.9–12.7)
POTASSIUM SERPL-SCNC: 3.7 MMOL/L (ref 3.5–5.3)
PROT SERPL-MCNC: 7.3 G/DL (ref 6.4–8.4)
RBC # BLD AUTO: 4.91 MILLION/UL (ref 3.88–5.62)
RBC MORPH BLD: NORMAL
SODIUM SERPL-SCNC: 135 MMOL/L (ref 135–147)
STOMATOCYTES BLD QL SMEAR: PRESENT
VARIANT LYMPHS # BLD AUTO: 8 %
WBC # BLD AUTO: 3.82 THOUSAND/UL (ref 4.31–10.16)

## 2024-02-18 PROCEDURE — G1004 CDSM NDSC: HCPCS

## 2024-02-18 PROCEDURE — 74177 CT ABD & PELVIS W/CONTRAST: CPT

## 2024-02-18 PROCEDURE — 99285 EMERGENCY DEPT VISIT HI MDM: CPT

## 2024-02-18 PROCEDURE — 85027 COMPLETE CBC AUTOMATED: CPT | Performed by: EMERGENCY MEDICINE

## 2024-02-18 PROCEDURE — 96361 HYDRATE IV INFUSION ADD-ON: CPT

## 2024-02-18 PROCEDURE — 85007 BL SMEAR W/DIFF WBC COUNT: CPT | Performed by: EMERGENCY MEDICINE

## 2024-02-18 PROCEDURE — 82077 ASSAY SPEC XCP UR&BREATH IA: CPT | Performed by: EMERGENCY MEDICINE

## 2024-02-18 PROCEDURE — 80053 COMPREHEN METABOLIC PANEL: CPT | Performed by: EMERGENCY MEDICINE

## 2024-02-18 PROCEDURE — 83605 ASSAY OF LACTIC ACID: CPT | Performed by: EMERGENCY MEDICINE

## 2024-02-18 PROCEDURE — 36415 COLL VENOUS BLD VENIPUNCTURE: CPT | Performed by: EMERGENCY MEDICINE

## 2024-02-18 PROCEDURE — 83690 ASSAY OF LIPASE: CPT | Performed by: EMERGENCY MEDICINE

## 2024-02-18 PROCEDURE — 99285 EMERGENCY DEPT VISIT HI MDM: CPT | Performed by: EMERGENCY MEDICINE

## 2024-02-18 PROCEDURE — 96374 THER/PROPH/DIAG INJ IV PUSH: CPT

## 2024-02-18 PROCEDURE — 96375 TX/PRO/DX INJ NEW DRUG ADDON: CPT

## 2024-02-18 RX ORDER — ALPRAZOLAM 0.25 MG/1
0.5 TABLET ORAL 2 TIMES DAILY PRN
Qty: 6 TABLET | Refills: 0 | Status: SHIPPED | OUTPATIENT
Start: 2024-02-18 | End: 2024-02-28

## 2024-02-18 RX ORDER — FAMOTIDINE 10 MG/ML
20 INJECTION, SOLUTION INTRAVENOUS ONCE
Status: COMPLETED | OUTPATIENT
Start: 2024-02-18 | End: 2024-02-18

## 2024-02-18 RX ORDER — ONDANSETRON 2 MG/ML
4 INJECTION INTRAMUSCULAR; INTRAVENOUS ONCE
Status: COMPLETED | OUTPATIENT
Start: 2024-02-18 | End: 2024-02-18

## 2024-02-18 RX ORDER — LORAZEPAM 2 MG/ML
1 INJECTION INTRAMUSCULAR ONCE
Status: COMPLETED | OUTPATIENT
Start: 2024-02-18 | End: 2024-02-18

## 2024-02-18 RX ORDER — ONDANSETRON 4 MG/1
4 TABLET, ORALLY DISINTEGRATING ORAL EVERY 6 HOURS PRN
Qty: 30 TABLET | Refills: 0 | Status: SHIPPED | OUTPATIENT
Start: 2024-02-18

## 2024-02-18 RX ADMIN — IOHEXOL 100 ML: 350 INJECTION, SOLUTION INTRAVENOUS at 02:10

## 2024-02-18 RX ADMIN — LORAZEPAM 1 MG: 2 INJECTION INTRAMUSCULAR; INTRAVENOUS at 01:29

## 2024-02-18 RX ADMIN — FAMOTIDINE 20 MG: 10 INJECTION, SOLUTION INTRAVENOUS at 01:22

## 2024-02-18 RX ADMIN — ONDANSETRON 4 MG: 2 INJECTION INTRAMUSCULAR; INTRAVENOUS at 01:22

## 2024-02-18 RX ADMIN — SODIUM CHLORIDE 1000 ML: 0.9 INJECTION, SOLUTION INTRAVENOUS at 01:22

## 2024-02-18 NOTE — ED NOTES
Patient returned from CT and this RN helped set patient back up on the monitor. Asked if the patient was interested in getting in touch with [facility] for assistance with his drinking. Pt stated he is already in contact with [facility] and will follow through with them upon discharge.      Trina Sands, MITESH  02/18/24 [id number]

## 2024-02-18 NOTE — ED PROVIDER NOTES
History  Chief Complaint   Patient presents with    Alcohol Problem     Pt reports relapse of alcohol abuse from Feb 12th to Feb 17th. Last drink at 10am yesterday. Hx of Etoh seizure. Pt fell Tuesday and injured left ankle.      Recent alcohol binge over the past week drinking 3 bottles of vodka daily, last drink this morning at 10 AM, now with abdominal pain and nausea and vomiting.      History provided by:  Patient   used: No    Medical Problem  Location:  Generalized abdomen  Quality:  Abdominal pain as well as nausea and vomiting secondary to recent binge drinking  Severity:  Severe  Onset quality:  Gradual  Timing:  Constant  Progression:  Unchanged  Chronicity:  New  Relieved by:  Nothing  Worsened by:  Nothing  Associated symptoms: abdominal pain, nausea and vomiting    Associated symptoms: no chest pain, no cough, no diarrhea, no ear pain, no fever, no headaches, no loss of consciousness, no rash, no shortness of breath, no sore throat and no wheezing        Prior to Admission Medications   Prescriptions Last Dose Informant Patient Reported? Taking?   busPIRone (BUSPAR) 30 MG tablet   Yes No   Sig: Take 10 mg by mouth 3 (three) times a day   chlordiazePOXIDE (LIBRIUM) 25 mg capsule   No No   Sig: Take 1 capsule (25 mg total) by mouth 3 (three) times a day for 2 days, THEN 1 capsule (25 mg total) 2 (two) times a day for 2 days, THEN 1 capsule (25 mg total) in the morning for 2 days.   chlordiazePOXIDE (LIBRIUM) 25 mg capsule   No No   Sig: Take 2 capsules (50 mg total) by mouth 4 (four) times a day as needed for withdrawal for up to 30 doses   chlordiazePOXIDE (LIBRIUM) 25 mg capsule   No No   Sig: Take 2 capsules (50 mg total) by mouth 4 (four) times a day as needed for withdrawal for up to 10 days   escitalopram (Lexapro) 20 mg tablet   No No   Sig: Take 1 tablet (20 mg total) by mouth daily   folic acid (FOLVITE) 1 mg tablet   No No   Sig: Take 1 tablet (1 mg total) by mouth daily Do  not start before December 10, 2023.   ondansetron (Zofran ODT) 4 mg disintegrating tablet   No No   Sig: Take 1 tablet (4 mg total) by mouth every 6 (six) hours as needed for nausea or vomiting for up to 10 doses   thiamine 100 MG tablet   No No   Sig: Take 1 tablet (100 mg total) by mouth daily Do not start before December 10, 2023.   traZODone (DESYREL) 50 mg tablet   Yes No   Sig: Take 50 mg by mouth daily at bedtime      Facility-Administered Medications: None       Past Medical History:   Diagnosis Date    Alcohol withdrawal seizure (HCC)     Anxiety     Depression     High triglycerides     Pancreatitis        History reviewed. No pertinent surgical history.    Family History   Problem Relation Age of Onset    Heart disease Mother     Diabetes Mother      I have reviewed and agree with the history as documented.    E-Cigarette/Vaping    E-Cigarette Use Current Every Day User      E-Cigarette/Vaping Substances    Nicotine Yes      Social History     Tobacco Use    Smoking status: Never    Smokeless tobacco: Never   Vaping Use    Vaping status: Every Day    Substances: Nicotine   Substance Use Topics    Alcohol use: Yes     Alcohol/week: 17.0 standard drinks of alcohol     Types: 17 Shots of liquor per week    Drug use: Never       Review of Systems   Constitutional:  Negative for chills and fever.   HENT:  Negative for ear pain, hearing loss, sore throat, trouble swallowing and voice change.    Eyes:  Negative for pain and discharge.   Respiratory:  Negative for cough, shortness of breath and wheezing.    Cardiovascular:  Negative for chest pain and palpitations.   Gastrointestinal:  Positive for abdominal pain, nausea and vomiting. Negative for blood in stool, constipation and diarrhea.   Genitourinary:  Negative for dysuria, flank pain, frequency and hematuria.   Musculoskeletal:  Negative for joint swelling, neck pain and neck stiffness.   Skin:  Negative for rash and wound.   Neurological:  Negative for  dizziness, seizures, loss of consciousness, syncope, facial asymmetry and headaches.   Psychiatric/Behavioral:  Negative for hallucinations, self-injury and suicidal ideas.    All other systems reviewed and are negative.      Physical Exam  Physical Exam  Vitals and nursing note reviewed.   Constitutional:       General: He is not in acute distress.     Appearance: He is well-developed.   HENT:      Head: Normocephalic and atraumatic.      Right Ear: External ear normal.      Left Ear: External ear normal.   Eyes:      General: No scleral icterus.        Right eye: No discharge.         Left eye: No discharge.      Extraocular Movements: Extraocular movements intact.      Conjunctiva/sclera: Conjunctivae normal.   Cardiovascular:      Rate and Rhythm: Normal rate and regular rhythm.      Heart sounds: Normal heart sounds. No murmur heard.  Pulmonary:      Effort: Pulmonary effort is normal.      Breath sounds: Normal breath sounds. No wheezing or rales.   Abdominal:      General: Bowel sounds are normal. There is no distension.      Palpations: Abdomen is soft.      Tenderness: There is abdominal tenderness. There is no guarding or rebound.   Musculoskeletal:         General: No deformity. Normal range of motion.      Cervical back: Normal range of motion and neck supple.   Skin:     General: Skin is warm and dry.      Findings: No rash.   Neurological:      General: No focal deficit present.      Mental Status: He is alert and oriented to person, place, and time.      Cranial Nerves: No cranial nerve deficit.   Psychiatric:         Mood and Affect: Mood normal.         Behavior: Behavior normal.         Thought Content: Thought content normal.         Judgment: Judgment normal.         Vital Signs  ED Triage Vitals [02/18/24 0107]   Temperature Pulse Respirations Blood Pressure SpO2   98 °F (36.7 °C) 93 19 127/86 97 %      Temp Source Heart Rate Source Patient Position - Orthostatic VS BP Location FiO2 (%)    Temporal Monitor Sitting Right arm --      Pain Score       8           Vitals:    02/18/24 0107 02/18/24 0109 02/18/24 0145   BP: 127/86 127/86 110/75   Pulse: 93 93 71   Patient Position - Orthostatic VS: Sitting           Visual Acuity      ED Medications  Medications   sodium chloride 0.9 % bolus 1,000 mL (1,000 mL Intravenous New Bag 2/18/24 0122)   ondansetron (ZOFRAN) injection 4 mg (4 mg Intravenous Given 2/18/24 0122)   Famotidine (PF) (PEPCID) injection 20 mg (20 mg Intravenous Given 2/18/24 0122)   LORazepam (ATIVAN) injection 1 mg (1 mg Intravenous Given 2/18/24 0129)   iohexol (OMNIPAQUE) 350 MG/ML injection (SINGLE-DOSE) 100 mL (100 mL Intravenous Given 2/18/24 0210)       Diagnostic Studies  Results Reviewed       Procedure Component Value Units Date/Time    Manual Differential(PHLEBS Do Not Order) [[id number]]  (Abnormal) Collected: 02/18/24 0125    Lab Status: Final result Specimen: Blood from Arm, Left Updated: 02/18/24 0315     Segmented % 56 %      Bands % 1 %      Lymphocytes % 25 %      Monocytes % 7 %      Eosinophils, % 1 %      Basophils % 2 %      Atypical Lymphocytes % 8 %      Absolute Neutrophils 2.18 Thousand/uL      Lymphocytes Absolute 1.26 Thousand/uL      Monocytes Absolute 0.27 Thousand/uL      Eosinophils Absolute 0.04 Thousand/uL      Basophils Absolute 0.08 Thousand/uL      Total Counted --     RBC Morphology Normal     Platelet Estimate Adequate     Large Platelet Present     Hypochromia Present     Stomatocytes Present    Ethanol [[id number]]  (Abnormal) Collected: 02/18/24 0125    Lab Status: Final result Specimen: Blood from Arm, Left Updated: 02/18/24 0151     Ethanol Lvl 75 mg/dL     Comprehensive metabolic panel [[id number]]  (Abnormal) Collected: 02/18/24 0125    Lab Status: Final result Specimen: Blood from Arm, Left Updated: 02/18/24 0150     Sodium 135 mmol/L      Potassium 3.7 mmol/L      Chloride 98 mmol/L      CO2 28 mmol/L      ANION GAP 9 mmol/L      BUN 15 mg/dL       Creatinine 1.01 mg/dL      Glucose 96 mg/dL      Calcium 8.8 mg/dL      AST 63 U/L      ALT 87 U/L      Alkaline Phosphatase 53 U/L      Total Protein 7.3 g/dL      Albumin 4.5 g/dL      Total Bilirubin 1.22 mg/dL      eGFR 98 ml/min/1.73sq m     Narrative:      [employer] guidelines for Chronic Kidney Disease (CKD):     Stage 1 with normal or high GFR (GFR > 90 mL/min/1.73 square meters)    Stage 2 Mild CKD (GFR = 60-89 mL/min/1.73 square meters)    Stage 3A Moderate CKD (GFR = 45-59 mL/min/1.73 square meters)    Stage 3B Moderate CKD (GFR = 30-44 mL/min/1.73 square meters)    Stage 4 Severe CKD (GFR = 15-29 mL/min/1.73 square meters)    Stage 5 End Stage CKD (GFR <15 mL/min/1.73 square meters)  Note: GFR calculation is accurate only with a steady state creatinine    Lipase [[id number]]  (Abnormal) Collected: 02/18/24 0125    Lab Status: Final result Specimen: Blood from Arm, Left Updated: 02/18/24 0150     Lipase 98 u/L     Lactic acid, plasma (w/reflex if result > 2.0) [[id number]]  (Normal) Collected: 02/18/24 0125    Lab Status: Final result Specimen: Blood from Arm, Left Updated: 02/18/24 0149     LACTIC ACID 1.7 mmol/L     Narrative:      Result may be elevated if tourniquet was used during collection.    CBC and differential [[id number]]  (Abnormal) Collected: 02/18/24 0125    Lab Status: Final result Specimen: Blood from Arm, Left Updated: 02/18/24 0134     WBC 3.82 Thousand/uL      RBC 4.91 Million/uL      Hemoglobin 15.3 g/dL      Hematocrit 42.8 %      MCV 87 fL      MCH 31.2 pg      MCHC 35.7 g/dL      RDW 12.9 %      MPV 8.8 fL      Platelets 217 Thousands/uL                    CT abdomen pelvis w contrast   Final Result by Dalton Cooper MD (02/18 0328)      No definite acute abnormality identified in the abdomen or pelvis.   No free air or free fluid.   ? Mild colitis at the splenic flexure in the appropriate clinical setting.   Hepatic steatosis.      Workstation performed:  [id number]                    Procedures  Procedures         ED Course  ED Course as of 02/18/24 0346   Sun Feb 18, 2024   0342 Patient feels much better after IV hydration, Ativan, Zofran.  Lab work is essentially benign and CT abdomen pelvis is negative.  Finding of questionable colitis does not correlate with the patient's physical examination or lab work.  Symptoms were most likely due to mild alcohol withdrawal.  Will prescribe short course of Xanax and will prescribe Zofran.  Patient has already [facility].                                             Medical Decision Making  Based on the history and medical screening exam performed the diagnostic considerations include but are not limited to alcohol withdrawal, gastritis, pancreatitis, viral gastroenteritis, alcoholic gastroenteritis.    Based on the work-up performed in the emergency room which includes physical examination, and which may include laboratory studies and imaging as warranted including advanced imaging such as CT scan or ultrasound, the diagnostic considerations are narrowed to exclude limb or life-threatening process.    The patient is stable for discharge.    Patient feels much better after IV hydration, Ativan, Zofran.  Lab work is essentially benign and CT abdomen pelvis is negative.  Finding of questionable colitis does not correlate with the patient's physical examination or lab work.  Symptoms were most likely due to mild alcohol withdrawal.  Will prescribe short course of Xanax and will prescribe Zofran.  Patient has already [facility].  Patient offered inpatient alcohol detox but states he will continue with his outpatient program as scheduled    Amount and/or Complexity of Data Reviewed  Labs: ordered. Decision-making details documented in ED Course.     Details: Benign  Radiology: ordered and independent interpretation performed.  Decision-making details documented in ED Course.     Details: CT abdomen pelvis negative    Risk  Prescription drug management.             Disposition  Final diagnoses:   Alcohol withdrawal (HCC)     Time reflects when diagnosis was documented in both MDM as applicable and the Disposition within this note       Time User Action Codes Description Comment    2/18/2024  3:44 AM Johnnie Moncada Add [F10.939] Alcohol withdrawal (HCC)           ED Disposition       ED Disposition   Discharge    Condition   Stable    Date/Time   Sun Feb 18, 2024  3:44 AM    Comment   Eliud Riccicovarenay discharge to home/self care.                   Follow-up Information       Follow up With Specialties Details Why Contact Info    Your primary care physician   As needed             Patient's Medications   Discharge Prescriptions    ALPRAZOLAM (XANAX) 0.25 MG TABLET    Take 2 tablets (0.5 mg total) by mouth 2 (two) times a day as needed for anxiety for up to 10 days       Start Date: 2/18/2024 End Date: 2/28/2024       Order Dose: 0.5 mg       Quantity: 6 tablet    Refills: 0    ONDANSETRON (ZOFRAN ODT) 4 MG DISINTEGRATING TABLET    Take 1 tablet (4 mg total) by mouth every 6 (six) hours as needed for nausea or vomiting       Start Date: 2/18/2024 End Date: --       Order Dose: 4 mg       Quantity: 30 tablet    Refills: 0       No discharge procedures on file.    PDMP Review         Value Time User    PDMP Reviewed  Yes 12/9/2023 10:17 AM Godwin Valdez MD            ED Provider  Electronically Signed by             Johnnie Moncada MD  02/18/24 0346

## 2024-02-18 NOTE — ED NOTES
While provider was assessing patient, pt became nauseous and vomited yellow liquid. Pt cleaned and linens changed.      Trina Sands RN  02/18/24 0203

## 2024-03-03 ENCOUNTER — HOSPITAL ENCOUNTER (EMERGENCY)
Facility: HOSPITAL | Age: 34
Discharge: HOME | End: 2024-03-03
Attending: EMERGENCY MEDICINE
Payer: COMMERCIAL

## 2024-03-03 VITALS
SYSTOLIC BLOOD PRESSURE: 123 MMHG | BODY MASS INDEX: 26.6 KG/M2 | RESPIRATION RATE: 17 BRPM | HEART RATE: 85 BPM | WEIGHT: 190 LBS | DIASTOLIC BLOOD PRESSURE: 77 MMHG | OXYGEN SATURATION: 95 % | TEMPERATURE: 98.8 F | HEIGHT: 71 IN

## 2024-03-03 DIAGNOSIS — F10.10 ALCOHOL ABUSE: Primary | ICD-10-CM

## 2024-03-03 DIAGNOSIS — E83.42 HYPOMAGNESEMIA: ICD-10-CM

## 2024-03-03 DIAGNOSIS — E87.6 HYPOKALEMIA: ICD-10-CM

## 2024-03-03 DIAGNOSIS — F10.929 ALCOHOL INTOXICATION (HCC): ICD-10-CM

## 2024-03-03 LAB
ALBUMIN SERPL BCP-MCNC: 4.7 G/DL (ref 3.5–5)
ALP SERPL-CCNC: 44 U/L (ref 34–104)
ALT SERPL W P-5'-P-CCNC: 47 U/L (ref 7–52)
ANION GAP SERPL CALCULATED.3IONS-SCNC: 16 MMOL/L
APTT PPP: 27 SECONDS (ref 23–37)
AST SERPL W P-5'-P-CCNC: 51 U/L (ref 13–39)
BASOPHILS # BLD AUTO: 0.09 THOUSANDS/ÂΜL (ref 0–0.1)
BASOPHILS NFR BLD AUTO: 1 % (ref 0–1)
BILIRUB SERPL-MCNC: 0.78 MG/DL (ref 0.2–1)
BUN SERPL-MCNC: 8 MG/DL (ref 5–25)
CALCIUM SERPL-MCNC: 9.8 MG/DL (ref 8.4–10.2)
CHLORIDE SERPL-SCNC: 93 MMOL/L (ref 96–108)
CO2 SERPL-SCNC: 30 MMOL/L (ref 21–32)
CREAT SERPL-MCNC: 1.22 MG/DL (ref 0.6–1.3)
EOSINOPHIL # BLD AUTO: 0.01 THOUSAND/ÂΜL (ref 0–0.61)
EOSINOPHIL NFR BLD AUTO: 0 % (ref 0–6)
ERYTHROCYTE [DISTWIDTH] IN BLOOD BY AUTOMATED COUNT: 13.1 % (ref 11.6–15.1)
ETHANOL SERPL-MCNC: 379 MG/DL
GFR SERPL CREATININE-BSD FRML MDRD: 78 ML/MIN/1.73SQ M
GLUCOSE SERPL-MCNC: 90 MG/DL (ref 65–140)
HCT VFR BLD AUTO: 42.9 % (ref 36.5–49.3)
HGB BLD-MCNC: 15.1 G/DL (ref 12–17)
IMM GRANULOCYTES # BLD AUTO: 0.02 THOUSAND/UL (ref 0–0.2)
IMM GRANULOCYTES NFR BLD AUTO: 0 % (ref 0–2)
INR PPP: 1.01 (ref 0.84–1.19)
LIPASE SERPL-CCNC: 28 U/L (ref 11–82)
LYMPHOCYTES # BLD AUTO: 1.73 THOUSANDS/ÂΜL (ref 0.6–4.47)
LYMPHOCYTES NFR BLD AUTO: 26 % (ref 14–44)
MAGNESIUM SERPL-MCNC: 1.8 MG/DL (ref 1.9–2.7)
MCH RBC QN AUTO: 30.8 PG (ref 26.8–34.3)
MCHC RBC AUTO-ENTMCNC: 35.2 G/DL (ref 31.4–37.4)
MCV RBC AUTO: 88 FL (ref 82–98)
MONOCYTES # BLD AUTO: 0.45 THOUSAND/ÂΜL (ref 0.17–1.22)
MONOCYTES NFR BLD AUTO: 7 % (ref 4–12)
NEUTROPHILS # BLD AUTO: 4.33 THOUSANDS/ÂΜL (ref 1.85–7.62)
NEUTS SEG NFR BLD AUTO: 66 % (ref 43–75)
NRBC BLD AUTO-RTO: 0 /100 WBCS
PLATELET # BLD AUTO: 243 THOUSANDS/UL (ref 149–390)
PMV BLD AUTO: 8.3 FL (ref 8.9–12.7)
POTASSIUM SERPL-SCNC: 3.3 MMOL/L (ref 3.5–5.3)
PROT SERPL-MCNC: 7.6 G/DL (ref 6.4–8.4)
PROTHROMBIN TIME: 13.6 SECONDS (ref 11.6–14.5)
RBC # BLD AUTO: 4.9 MILLION/UL (ref 3.88–5.62)
SODIUM SERPL-SCNC: 139 MMOL/L (ref 135–147)
WBC # BLD AUTO: 6.63 THOUSAND/UL (ref 4.31–10.16)

## 2024-03-03 PROCEDURE — 85025 COMPLETE CBC W/AUTO DIFF WBC: CPT | Performed by: EMERGENCY MEDICINE

## 2024-03-03 PROCEDURE — 93005 ELECTROCARDIOGRAM TRACING: CPT

## 2024-03-03 PROCEDURE — 96368 THER/DIAG CONCURRENT INF: CPT

## 2024-03-03 PROCEDURE — 36415 COLL VENOUS BLD VENIPUNCTURE: CPT | Performed by: EMERGENCY MEDICINE

## 2024-03-03 PROCEDURE — 96367 TX/PROPH/DG ADDL SEQ IV INF: CPT

## 2024-03-03 PROCEDURE — 80053 COMPREHEN METABOLIC PANEL: CPT | Performed by: EMERGENCY MEDICINE

## 2024-03-03 PROCEDURE — 85610 PROTHROMBIN TIME: CPT | Performed by: EMERGENCY MEDICINE

## 2024-03-03 PROCEDURE — 83735 ASSAY OF MAGNESIUM: CPT | Performed by: EMERGENCY MEDICINE

## 2024-03-03 PROCEDURE — 85730 THROMBOPLASTIN TIME PARTIAL: CPT | Performed by: EMERGENCY MEDICINE

## 2024-03-03 PROCEDURE — 83690 ASSAY OF LIPASE: CPT | Performed by: EMERGENCY MEDICINE

## 2024-03-03 PROCEDURE — 99284 EMERGENCY DEPT VISIT MOD MDM: CPT

## 2024-03-03 PROCEDURE — 82077 ASSAY SPEC XCP UR&BREATH IA: CPT | Performed by: EMERGENCY MEDICINE

## 2024-03-03 PROCEDURE — 96375 TX/PRO/DX INJ NEW DRUG ADDON: CPT

## 2024-03-03 PROCEDURE — 96365 THER/PROPH/DIAG IV INF INIT: CPT

## 2024-03-03 RX ORDER — FOLIC ACID 1 MG/1
1 TABLET ORAL ONCE
Status: COMPLETED | OUTPATIENT
Start: 2024-03-03 | End: 2024-03-03

## 2024-03-03 RX ORDER — ONDANSETRON 4 MG/1
4 TABLET, ORALLY DISINTEGRATING ORAL EVERY 6 HOURS PRN
Qty: 12 TABLET | Refills: 0 | Status: SHIPPED | OUTPATIENT
Start: 2024-03-03

## 2024-03-03 RX ORDER — POTASSIUM CHLORIDE 20 MEQ/1
40 TABLET, EXTENDED RELEASE ORAL ONCE
Status: COMPLETED | OUTPATIENT
Start: 2024-03-03 | End: 2024-03-03

## 2024-03-03 RX ORDER — DIAZEPAM 5 MG/ML
5 INJECTION, SOLUTION INTRAMUSCULAR; INTRAVENOUS ONCE
Status: DISCONTINUED | OUTPATIENT
Start: 2024-03-03 | End: 2024-03-03

## 2024-03-03 RX ORDER — MAGNESIUM SULFATE 1 G/100ML
1 INJECTION INTRAVENOUS ONCE
Status: COMPLETED | OUTPATIENT
Start: 2024-03-03 | End: 2024-03-03

## 2024-03-03 RX ORDER — CHLORDIAZEPOXIDE HYDROCHLORIDE 25 MG/1
25 CAPSULE, GELATIN COATED ORAL 3 TIMES DAILY PRN
Qty: 20 CAPSULE | Refills: 0 | Status: SHIPPED | OUTPATIENT
Start: 2024-03-03 | End: 2024-03-13

## 2024-03-03 RX ORDER — DIAZEPAM 5 MG/ML
10 INJECTION, SOLUTION INTRAMUSCULAR; INTRAVENOUS ONCE
Status: COMPLETED | OUTPATIENT
Start: 2024-03-03 | End: 2024-03-03

## 2024-03-03 RX ORDER — SODIUM CHLORIDE, SODIUM GLUCONATE, SODIUM ACETATE, POTASSIUM CHLORIDE, MAGNESIUM CHLORIDE, SODIUM PHOSPHATE, DIBASIC, AND POTASSIUM PHOSPHATE .53; .5; .37; .037; .03; .012; .00082 G/100ML; G/100ML; G/100ML; G/100ML; G/100ML; G/100ML; G/100ML
1000 INJECTION, SOLUTION INTRAVENOUS ONCE
Status: COMPLETED | OUTPATIENT
Start: 2024-03-03 | End: 2024-03-03

## 2024-03-03 RX ORDER — ONDANSETRON 2 MG/ML
4 INJECTION INTRAMUSCULAR; INTRAVENOUS ONCE
Status: COMPLETED | OUTPATIENT
Start: 2024-03-03 | End: 2024-03-03

## 2024-03-03 RX ADMIN — DIAZEPAM 10 MG: 5 INJECTION INTRAMUSCULAR; INTRAVENOUS at 03:11

## 2024-03-03 RX ADMIN — FOLIC ACID 1 MG: 1 TABLET ORAL at 04:37

## 2024-03-03 RX ADMIN — MAGNESIUM SULFATE HEPTAHYDRATE 1 G: 1 INJECTION, SOLUTION INTRAVENOUS at 04:08

## 2024-03-03 RX ADMIN — POTASSIUM CHLORIDE 40 MEQ: 1500 TABLET, EXTENDED RELEASE ORAL at 04:02

## 2024-03-03 RX ADMIN — SODIUM CHLORIDE, SODIUM GLUCONATE, SODIUM ACETATE, POTASSIUM CHLORIDE, MAGNESIUM CHLORIDE, SODIUM PHOSPHATE, DIBASIC, AND POTASSIUM PHOSPHATE 1000 ML: .53; .5; .37; .037; .03; .012; .00082 INJECTION, SOLUTION INTRAVENOUS at 03:12

## 2024-03-03 RX ADMIN — ONDANSETRON 4 MG: 2 INJECTION INTRAMUSCULAR; INTRAVENOUS at 03:12

## 2024-03-03 RX ADMIN — THIAMINE HYDROCHLORIDE 100 MG: 100 INJECTION, SOLUTION INTRAMUSCULAR; INTRAVENOUS at 03:57

## 2024-03-03 NOTE — ED PROVIDER NOTES
History  Chief Complaint   Patient presents with    Alcohol Problem     Pt returns for Etoh relapse. Pt drinking Vodka 2/3 of a handle each day since Monday. Last drink 1 hour PTA. +N/V     Patient is a 33-year-old male history of alcohol abuse and prior alcohol withdrawal seizures and anxiety and depression presents to the emergency department complaining of nausea and vomiting and shakes and suspected alcohol withdrawal after reporting he has been drinking heavily about two thirds of a handle of vodka every day for the past week and trying to stop drinking over the past 24 hours did drink prior to arrival to the ED tonight.  Patient denies any SI or HI patient reports he is in a alcohol treatment program at [facility] does not want to seek placement into an inpatient drug and alcohol rehab treatment program currently.        History provided by:  Patient      Prior to Admission Medications   Prescriptions Last Dose Informant Patient Reported? Taking?   ALPRAZolam (XANAX) 0.25 mg tablet   No No   Sig: Take 2 tablets (0.5 mg total) by mouth 2 (two) times a day as needed for anxiety for up to 10 days   busPIRone (BUSPAR) 30 MG tablet   Yes No   Sig: Take 10 mg by mouth 3 (three) times a day   chlordiazePOXIDE (LIBRIUM) 25 mg capsule   No No   Sig: Take 1 capsule (25 mg total) by mouth 3 (three) times a day for 2 days, THEN 1 capsule (25 mg total) 2 (two) times a day for 2 days, THEN 1 capsule (25 mg total) in the morning for 2 days.   chlordiazePOXIDE (LIBRIUM) 25 mg capsule   No No   Sig: Take 2 capsules (50 mg total) by mouth 4 (four) times a day as needed for withdrawal for up to 30 doses   chlordiazePOXIDE (LIBRIUM) 25 mg capsule   No No   Sig: Take 2 capsules (50 mg total) by mouth 4 (four) times a day as needed for withdrawal for up to 10 days   escitalopram (Lexapro) 20 mg tablet   No No   Sig: Take 1 tablet (20 mg total) by mouth daily   folic acid (FOLVITE) 1 mg tablet   No No   Sig: Take 1 tablet (1 mg  total) by mouth daily Do not start before December 10, 2023.   ondansetron (Zofran ODT) 4 mg disintegrating tablet   No No   Sig: Take 1 tablet (4 mg total) by mouth every 6 (six) hours as needed for nausea or vomiting for up to 10 doses   ondansetron (Zofran ODT) 4 mg disintegrating tablet   No No   Sig: Take 1 tablet (4 mg total) by mouth every 6 (six) hours as needed for nausea or vomiting   thiamine 100 MG tablet   No No   Sig: Take 1 tablet (100 mg total) by mouth daily Do not start before December 10, 2023.   traZODone (DESYREL) 50 mg tablet   Yes No   Sig: Take 50 mg by mouth daily at bedtime      Facility-Administered Medications: None       Past Medical History:   Diagnosis Date    Alcohol withdrawal seizure (HCC)     Anxiety     Depression     High triglycerides     Pancreatitis        History reviewed. No pertinent surgical history.    Family History   Problem Relation Age of Onset    Heart disease Mother     Diabetes Mother      I have reviewed and agree with the history as documented.    E-Cigarette/Vaping    E-Cigarette Use Current Every Day User      E-Cigarette/Vaping Substances    Nicotine Yes      Social History     Tobacco Use    Smoking status: Never    Smokeless tobacco: Never   Vaping Use    Vaping status: Every Day    Substances: Nicotine   Substance Use Topics    Alcohol use: Yes     Alcohol/week: 17.0 standard drinks of alcohol     Types: 17 Shots of liquor per week    Drug use: Never       Review of Systems   Constitutional:  Negative for activity change, appetite change, chills, fatigue and fever.   HENT:  Negative for congestion, ear pain, rhinorrhea and sore throat.    Eyes:  Negative for discharge, redness and visual disturbance.   Respiratory:  Negative for cough, chest tightness, shortness of breath and wheezing.    Cardiovascular:  Negative for chest pain and palpitations.   Gastrointestinal:  Positive for abdominal pain, nausea and vomiting. Negative for constipation and diarrhea.    Endocrine: Negative for polydipsia and polyuria.   Genitourinary:  Negative for difficulty urinating, dysuria, frequency, hematuria and urgency.   Musculoskeletal:  Negative for arthralgias and myalgias.   Skin:  Negative for color change, pallor and rash.   Neurological:  Negative for dizziness, weakness, light-headedness, numbness and headaches.   Hematological:  Negative for adenopathy. Does not bruise/bleed easily.   Psychiatric/Behavioral:  The patient is nervous/anxious.    All other systems reviewed and are negative.      Physical Exam  Physical Exam  Vitals and nursing note reviewed.   Constitutional:       Appearance: He is well-developed.   HENT:      Head: Normocephalic and atraumatic.      Right Ear: External ear normal.      Left Ear: External ear normal.      Nose: Nose normal.   Eyes:      Conjunctiva/sclera: Conjunctivae normal.      Pupils: Pupils are equal, round, and reactive to light.   Cardiovascular:      Rate and Rhythm: Normal rate and regular rhythm.      Heart sounds: Normal heart sounds.   Pulmonary:      Effort: Pulmonary effort is normal. No respiratory distress.      Breath sounds: Normal breath sounds. No wheezing or rales.   Chest:      Chest wall: No tenderness.   Abdominal:      General: Bowel sounds are normal. There is no distension.      Palpations: Abdomen is soft.      Tenderness: There is no abdominal tenderness. There is no guarding.   Musculoskeletal:         General: Normal range of motion.      Cervical back: Normal range of motion and neck supple.   Skin:     General: Skin is warm and dry.   Neurological:      Mental Status: He is alert and oriented to person, place, and time.      Cranial Nerves: No cranial nerve deficit.      Sensory: No sensory deficit.         Vital Signs  ED Triage Vitals [03/03/24 0245]   Temperature Pulse Respirations Blood Pressure SpO2   98.8 °F (37.1 °C) 89 19 116/82 94 %      Temp Source Heart Rate Source Patient Position - Orthostatic VS BP  Location FiO2 (%)   Temporal Monitor Lying Left arm --      Pain Score       --           Vitals:    03/03/24 0315 03/03/24 0330 03/03/24 0415 03/03/24 0430   BP: 114/64 116/64 116/64 114/64   Pulse: 92 88 89 89   Patient Position - Orthostatic VS: Lying  Lying Lying         Visual Acuity  Visual Acuity      Flowsheet Row Most Recent Value   L Pupil Size (mm) 3   R Pupil Size (mm) 3            ED Medications  Medications   magnesium sulfate IVPB (premix) SOLN 1 g (1 g Intravenous New Bag 3/3/24 0408)   multi-electrolyte (ISOLYTE-S PH 7.4) bolus 1,000 mL (1,000 mL Intravenous New Bag 3/3/24 0312)   ondansetron (ZOFRAN) injection 4 mg (4 mg Intravenous Given 3/3/24 0312)   thiamine (VITAMIN B1) 100 mg in sodium chloride 0.9 % 50 mL IVPB (0 mg Intravenous Stopped 3/3/24 0427)   diazepam (VALIUM) injection 10 mg (10 mg Intravenous Given 3/3/24 0311)   potassium chloride (Klor-Con M20) CR tablet 40 mEq (40 mEq Oral Given 3/3/24 0402)   folic acid (FOLVITE) tablet 1 mg (1 mg Oral Given 3/3/24 0437)       Diagnostic Studies  Results Reviewed       Procedure Component Value Units Date/Time    Comprehensive metabolic panel [[id number]]  (Abnormal) Collected: 03/03/24 0314    Lab Status: Final result Specimen: Blood from Arm, Left Updated: 03/03/24 0338     Sodium 139 mmol/L      Potassium 3.3 mmol/L      Chloride 93 mmol/L      CO2 30 mmol/L      ANION GAP 16 mmol/L      BUN 8 mg/dL      Creatinine 1.22 mg/dL      Glucose 90 mg/dL      Calcium 9.8 mg/dL      AST 51 U/L      ALT 47 U/L      Alkaline Phosphatase 44 U/L      Total Protein 7.6 g/dL      Albumin 4.7 g/dL      Total Bilirubin 0.78 mg/dL      eGFR 78 ml/min/1.73sq m     Narrative:      National Kidney Disease Foundation guidelines for Chronic Kidney Disease (CKD):     Stage 1 with normal or high GFR (GFR > 90 mL/min/1.73 square meters)    Stage 2 Mild CKD (GFR = 60-89 mL/min/1.73 square meters)    Stage 3A Moderate CKD (GFR = 45-59 mL/min/1.73 square meters)     Stage 3B Moderate CKD (GFR = 30-44 mL/min/1.73 square meters)    Stage 4 Severe CKD (GFR = 15-29 mL/min/1.73 square meters)    Stage 5 End Stage CKD (GFR <15 mL/min/1.73 square meters)  Note: GFR calculation is accurate only with a steady state creatinine    Magnesium [[id number]]  (Abnormal) Collected: 03/03/24 0314    Lab Status: Final result Specimen: Blood from Arm, Left Updated: 03/03/24 0338     Magnesium 1.8 mg/dL     Lipase [[id number]]  (Normal) Collected: 03/03/24 0314    Lab Status: Final result Specimen: Blood from Arm, Left Updated: 03/03/24 0338     Lipase 28 u/L     Ethanol [[id number]]  (Abnormal) Collected: 03/03/24 0314    Lab Status: Final result Specimen: Blood from Arm, Left Updated: 03/03/24 0337     Ethanol Lvl 379 mg/dL     Protime-INR [[id number]]  (Normal) Collected: 03/03/24 0314    Lab Status: Final result Specimen: Blood from Arm, Left Updated: 03/03/24 0334     Protime 13.6 seconds      INR 1.01    APTT [[id number]]  (Normal) Collected: 03/03/24 0314    Lab Status: Final result Specimen: Blood from Arm, Left Updated: 03/03/24 0334     PTT 27 seconds     CBC and differential [[id number]]  (Abnormal) Collected: 03/03/24 0314    Lab Status: Final result Specimen: Blood from Arm, Left Updated: 03/03/24 0322     WBC 6.63 Thousand/uL      RBC 4.90 Million/uL      Hemoglobin 15.1 g/dL      Hematocrit 42.9 %      MCV 88 fL      MCH 30.8 pg      MCHC 35.2 g/dL      RDW 13.1 %      MPV 8.3 fL      Platelets 243 Thousands/uL      nRBC 0 /100 WBCs      Neutrophils Relative 66 %      Immat GRANS % 0 %      Lymphocytes Relative 26 %      Monocytes Relative 7 %      Eosinophils Relative 0 %      Basophils Relative 1 %      Neutrophils Absolute 4.33 Thousands/µL      Immature Grans Absolute 0.02 Thousand/uL      Lymphocytes Absolute 1.73 Thousands/µL      Monocytes Absolute 0.45 Thousand/µL      Eosinophils Absolute 0.01 Thousand/µL      Basophils Absolute 0.09 Thousands/µL     UA w Reflex to Microscopic w  Reflex to Culture [[id number]]     Lab Status: No result Specimen: Urine     Rapid drug screen, urine [[id number]]     Lab Status: No result Specimen: Urine                    No orders to display              Procedures  ECG 12 Lead Documentation Only    Date/Time: 3/3/2024 4:04 AM    Performed by: Shaheen Hart DO  Authorized by: Shaheen Hart DO    ECG reviewed by me, the ED Provider: yes    Patient location:  ED  Previous ECG:     Comparison to cardiac monitor: Yes    Rate:     ECG rate:  89    ECG rate assessment: normal    Rhythm:     Rhythm: sinus rhythm    QRS:     QRS axis:  Normal    QRS intervals:  Normal  Conduction:     Conduction: normal    ST segments:     ST segments:  Normal  T waves:     T waves: normal             ED Course  ED Course as of 03/03/24 0459   Sun Mar 03, 2024   0334 Patient completed warm handoff process in the ED he was offered and recommended to go to detox however he declined this option as reported to nursing staff from warm handoff personnel.  Warm handoff personnel will follow-up with patient.                                               Medical Decision Making  Differential diagnosis included but not limited to alcohol withdrawal pancreatitis gastroenteritis electrolyte derangement acute kidney injury.  Patient is afebrile nontoxic well-appearing clinically and hemodynamically stable in the emergency department felt improved after rest fluids and meds given the emergency department there is no evidence of significant alcohol withdrawal at current time or delirium tremens as patient is currently intoxicated no tachycardia or hypotension.  Workup in the ED reveals only mild hypokalemia and hypomagnesemia which which were treated with IV and oral electrolyte replacements in the ED patient also received fluids and anxiolytic in the ED and antiemetic and felt improved.  Patient was offered and advised and recommended for inpatient detox however he declined this and does not want  inpatient detox at this time.  Patient has been established with a drug and alcohol counseling program [facility] and wishes to return home with prescription for Librium and antiemetics and try to work on his own with outpatient resources to stop using alcohol advise prompt follow-up with primary physician and warm handoff program for further evaluation and continued management. return precautions and anticipatory as discussed.      Problems Addressed:  Alcohol abuse: acute illness or injury  Alcohol intoxication (HCC): acute illness or injury  Hypokalemia: acute illness or injury  Hypomagnesemia: acute illness or injury    Amount and/or Complexity of Data Reviewed  Labs: ordered. Decision-making details documented in ED Course.  ECG/medicine tests: ordered and independent interpretation performed. Decision-making details documented in ED Course.    Risk  Prescription drug management.             Disposition  Final diagnoses:   Alcohol abuse   Alcohol intoxication (HCC)   Hypomagnesemia   Hypokalemia     Time reflects when diagnosis was documented in both MDM as applicable and the Disposition within this note       Time User Action Codes Description Comment    3/3/2024  3:44 AM Shaheen Hart Add [F10.10] Alcohol abuse     3/3/2024  3:44 AM Shaheen Hart Add [F10.929] Alcohol intoxication (HCC)     3/3/2024  3:44 AM Shaheen Hart Add [E83.42] Hypomagnesemia     3/3/2024  3:44 AM Shaheen Hart Add [E87.6] Hypokalemia           ED Disposition       ED Disposition   Discharge    Condition   Stable    Date/Time   Sun Mar 3, 2024  4:03 AM    Comment   Eliud Clarke discharge to home/self care.                   Follow-up Information       Follow up With Specialties Details Why Contact Info    [facility]  Call in 1 day  [facility] [address]  [phone]        Schedule an appointment as soon as possible for a visit in 2 days  your pcp             Patient's Medications   Discharge Prescriptions    CHLORDIAZEPOXIDE (LIBRIUM) 25 MG CAPSULE    Take 1 capsule (25 mg total) by mouth 3 (three) times a day as needed for anxiety or withdrawal for up to 10 days       Start Date: 3/3/2024  End Date: 3/13/2024       Order Dose: 25 mg       Quantity: 20 capsule    Refills: 0    ONDANSETRON (ZOFRAN-ODT) 4 MG DISINTEGRATING TABLET    Take 1 tablet (4 mg total) by mouth every 6 (six) hours as needed for nausea or vomiting       Start Date: 3/3/2024  End Date: --       Order Dose: 4 mg       Quantity: 12 tablet    Refills: 0       No discharge procedures on file.    PDMP Review         Value Time User    PDMP Reviewed  Yes 12/9/2023 10:17 AM Godwin Valdez MD            ED Provider  Electronically Signed by             Shaheen Hart DO  03/03/24 0459

## 2024-03-03 NOTE — ED NOTES
Patient is speaking with warm handoff at this time       Bernie Rodriguez, MITESH  03/03/24 4954

## 2024-03-03 NOTE — ED NOTES
Patient spoke with warm handoff, and does not want detox at this time.      Bernie Rodriguez, MITESH  03/03/24 0420

## 2024-03-04 LAB
ATRIAL RATE: 89 BPM
P AXIS: 69 DEGREES
PR INTERVAL: 186 MS
QRS AXIS: 65 DEGREES
QRSD INTERVAL: 90 MS
QT INTERVAL: 388 MS
QTC INTERVAL: 472 MS
T WAVE AXIS: 62 DEGREES
VENTRICULAR RATE: 89 BPM

## 2024-03-05 NOTE — ED CARE HANDOFF
[facility] Warm Handoff Outcome Note    Patient name Eliud Clarke  Location ED 09/ED 09 MRN [id number]  Age: 33 y.o.          Plan Type:  Warm Handoff                                                                                    Plan Date: 3/5/2024  Service:  ED Warm Handoff      Substance Use History:  ETOH    Warm Handoff Update:  Pt not interested in treatment    Warm Handoff Outcome: Patient Refused

## 2024-03-18 ENCOUNTER — OFFICE VISIT (OUTPATIENT)
Dept: FAMILY MEDICINE CLINIC | Facility: CLINIC | Age: 34
End: 2024-03-18
Payer: COMMERCIAL

## 2024-03-18 VITALS
HEART RATE: 73 BPM | SYSTOLIC BLOOD PRESSURE: 100 MMHG | HEIGHT: 71 IN | DIASTOLIC BLOOD PRESSURE: 80 MMHG | BODY MASS INDEX: 27.69 KG/M2 | OXYGEN SATURATION: 97 % | TEMPERATURE: 97 F | WEIGHT: 197.8 LBS

## 2024-03-18 DIAGNOSIS — Z00.00 ANNUAL PHYSICAL EXAM: Primary | ICD-10-CM

## 2024-03-18 DIAGNOSIS — F41.9 ANXIETY: ICD-10-CM

## 2024-03-18 DIAGNOSIS — F10.20 ALCOHOL USE DISORDER, SEVERE, DEPENDENCE (HCC): ICD-10-CM

## 2024-03-18 DIAGNOSIS — F10.930 ALCOHOL WITHDRAWAL SYNDROME WITHOUT COMPLICATION (HCC): ICD-10-CM

## 2024-03-18 DIAGNOSIS — F17.290 NICOTINE DEPENDENCE DUE TO VAPING TOBACCO PRODUCT: ICD-10-CM

## 2024-03-18 PROBLEM — F10.939 ALCOHOL WITHDRAWAL (HCC): Status: RESOLVED | Noted: 2021-09-02 | Resolved: 2024-03-18

## 2024-03-18 PROBLEM — F10.939 ALCOHOL WITHDRAWAL (HCC): Status: ACTIVE | Noted: 2021-09-02

## 2024-03-18 PROCEDURE — 99213 OFFICE O/P EST LOW 20 MIN: CPT | Performed by: NURSE PRACTITIONER

## 2024-03-18 PROCEDURE — 99385 PREV VISIT NEW AGE 18-39: CPT | Performed by: NURSE PRACTITIONER

## 2024-03-18 RX ORDER — PAROXETINE HYDROCHLORIDE HEMIHYDRATE 12.5 MG/1
12.5 TABLET, FILM COATED, EXTENDED RELEASE ORAL EVERY MORNING
Qty: 30 TABLET | Refills: 1 | Status: SHIPPED | OUTPATIENT
Start: 2024-03-18

## 2024-03-18 RX ORDER — CHLORDIAZEPOXIDE HYDROCHLORIDE 25 MG/1
25 CAPSULE, GELATIN COATED ORAL 3 TIMES DAILY PRN
Qty: 20 CAPSULE | Refills: 0 | Status: SHIPPED | OUTPATIENT
Start: 2024-03-18

## 2024-03-18 RX ORDER — BUSPIRONE HYDROCHLORIDE 30 MG/1
30 TABLET ORAL 2 TIMES DAILY
COMMUNITY

## 2024-03-18 RX ORDER — HYDROXYZINE PAMOATE 25 MG/1
CAPSULE ORAL
COMMUNITY
Start: 2024-02-09 | End: 2024-03-18

## 2024-03-18 RX ORDER — CALCIUM CARBONATE 500 MG/1
500 TABLET, CHEWABLE ORAL
COMMUNITY

## 2024-03-18 NOTE — PROGRESS NOTES
ADULT ANNUAL PHYSICAL  [facility] PRIMARY CARE    NAME: Eliud Clarke  AGE: 33 y.o. SEX: male  : 1990     DATE: 3/18/2024     Assessment and Plan:     Problem List Items Addressed This Visit          Behavioral Health    Alcohol withdrawal syndrome without complication (HCC)     Currently remains sober - is going to meetings 6 days a week.           Anxiety     We reviewed options for him to switch over to a different medication. Will have him stop the Lexapro and start Paxil. He should continue with the Buspar at this time.    I also provided him with Librium to take as needed with increased anxiety/panic attacks as he has been able to tolerate that well.  PDMP reviewed with no red flags.           Relevant Medications    PARoxetine (PAXIL-CR) 12.5 mg 24 hr tablet    chlordiazePOXIDE (LIBRIUM) 25 mg capsule    Nicotine dependence due to vaping tobacco product       Other    Alcohol use disorder, severe, dependence (HCC)     Currently sober.            Other Visit Diagnoses       Annual physical exam    -  Primary    BMI 27.0-27.9,adult                Immunizations and preventive care screenings were discussed with patient today. Appropriate education was printed on patient's after visit summary.    Counseling:  Alcohol/drug use: discussed moderation in alcohol intake, the recommendations for healthy alcohol use, and avoidance of illicit drug use.         Return in about 4 weeks (around 4/15/2024) for Next scheduled follow up.     Chief Complaint:     Chief Complaint   Patient presents with    Physical Exam     Medication management       History of Present Illness:     Adult Annual Physical   Patient here for a comprehensive physical exam. The patient reports hx of severe anxiety with ETOH misuse.  He also reports he has panic attacks where he starts to shake and cannot get calm.  He has been on two different medications since  which have not been  helping him.      Diet and Physical Activity  Diet/Nutrition: well balanced diet.   Exercise: no formal exercise.      Depression Screening  PHQ-2/9 Depression Screening    Little interest or pleasure in doing things: 1 - several days  Feeling down, depressed, or hopeless: 2 - more than half the days  Trouble falling or staying asleep, or sleeping too much: 3 - nearly every day  Feeling tired or having little energy: 2 - more than half the days  Poor appetite or overeating: 3 - nearly every day  Feeling bad about yourself - or that you are a failure or have let yourself or your family down: 2 - more than half the days  Trouble concentrating on things, such as reading the newspaper or watching television: 3 - nearly every day  Moving or speaking so slowly that other people could have noticed. Or the opposite - being so fidgety or restless that you have been moving around a lot more than usual: 1 - several days  Thoughts that you would be better off dead, or of hurting yourself in some way: 1 - several days  PHQ-2 Score: 3  PHQ-2 Interpretation: POSITIVE depression screen  PHQ-9 Score: 18  PHQ-9 Interpretation: Moderately severe depression       General Health  Sleep: sleeps well.   Hearing: normal - bilateral.  Vision: no vision problems.   Dental: regular dental visits.        Health  History of STDs?: no.    Advanced Care Planning  Do you have an advanced directive? no  Do you have a durable medical power of ? no  ACP document given to the patient? no     Review of Systems:     Review of Systems   Constitutional: Negative.    Respiratory: Negative.     Cardiovascular: Negative.    Psychiatric/Behavioral:  The patient is nervous/anxious.    All other systems reviewed and are negative.     Past Medical History:     Past Medical History:   Diagnosis Date    Alcohol withdrawal seizure (HCC)     Anxiety     Depression     High triglycerides     Pancreatitis       Past Surgical History:     History reviewed. No  pertinent surgical history.   Social History:     Social History     Socioeconomic History    Marital status: Single     Spouse name: None    Number of children: None    Years of education: None    Highest education level: None   Occupational History    None   Tobacco Use    Smoking status: Never     Passive exposure: Never    Smokeless tobacco: Never   Vaping Use    Vaping status: Every Day    Substances: Nicotine   Substance and Sexual Activity    Alcohol use: Yes     Alcohol/week: 17.0 standard drinks of alcohol     Types: 17 Shots of liquor per week    Drug use: Never    Sexual activity: Not Currently   Other Topics Concern    None   Social History Narrative    None     Social Determinants of Health     Financial Resource Strain: Low Risk  (10/28/2023)    Received from [facility]    Overall Financial Resource Strain (CARDIA)     Difficulty of Paying Living Expenses: Not hard at all   Food Insecurity: No Food Insecurity (12/8/2023)    Hunger Vital Sign     Worried About Running Out of Food in the Last Year: Never true     Ran Out of Food in the Last Year: Never true   Transportation Needs: No Transportation Needs (12/8/2023)    PRAPARE - Transportation     Lack of Transportation (Medical): No     Lack of Transportation (Non-Medical): No   Physical Activity: Not on file   Stress: Not on file   Social Connections: Not on file   Intimate Partner Violence: Not At Risk (10/28/2023)    Received from [facility]    Humiliation, Afraid, Rape, and Kick questionnaire     Fear of Current or Ex-Partner: No     Emotionally Abused: No     Physically Abused: No     Sexually Abused: No   Housing Stability: Low Risk  (12/8/2023)    Housing Stability Vital Sign     Unable to Pay for Housing in the Last Year: No     Number of Places Lived in the Last Year: 1     Unstable Housing in the Last Year: No      Family History:     Family History   Problem Relation Age of Onset    Heart disease Mother      "Diabetes Mother       Current Medications:     Current Outpatient Medications   Medication Sig Dispense Refill    busPIRone (BUSPAR) 30 MG tablet Take 10 mg by mouth 3 (three) times a day      busPIRone (BUSPAR) 30 MG tablet Take 30 mg by mouth 2 (two) times a day      calcium carbonate (TUMS) 500 mg chewable tablet Chew 500 mg      chlordiazePOXIDE (LIBRIUM) 25 mg capsule Take 1 capsule (25 mg total) by mouth 3 (three) times a day as needed for anxiety 20 capsule 0    PARoxetine (PAXIL-CR) 12.5 mg 24 hr tablet Take 1 tablet (12.5 mg total) by mouth every morning 30 tablet 1    traZODone (DESYREL) 50 mg tablet Take 50 mg by mouth daily at bedtime      folic acid (FOLVITE) 1 mg tablet Take 1 tablet (1 mg total) by mouth daily Do not start before December 10, 2023. 30 tablet 0    thiamine 100 MG tablet Take 1 tablet (100 mg total) by mouth daily Do not start before December 10, 2023. 30 tablet 0     No current facility-administered medications for this visit.      Allergies:     Allergies   Allergen Reactions    Erythromycin Rash      Physical Exam:     /80 (BP Location: Left arm, Patient Position: Sitting, Cuff Size: Standard)   Pulse 73   Temp (!) 97 °F (36.1 °C)   Ht 5' 11\" (1.803 m)   Wt 89.7 kg (197 lb 12.8 oz)   SpO2 97%   BMI 27.59 kg/m²     Physical Exam  Cardiovascular:      Rate and Rhythm: Normal rate and regular rhythm.   Pulmonary:      Effort: Pulmonary effort is normal.      Breath sounds: Normal breath sounds.   Neurological:      Mental Status: He is alert.   Psychiatric:         Attention and Perception: Attention normal.         Mood and Affect: Mood is anxious.         Speech: Speech normal.         Behavior: Behavior normal. Behavior is cooperative.         Thought Content: Thought content normal.          FARZANEH Padilla   [facility]    "

## 2024-03-18 NOTE — ASSESSMENT & PLAN NOTE
We reviewed options for him to switch over to a different medication. Will have him stop the Lexapro and start Paxil. He should continue with the Buspar at this time.    I also provided him with Librium to take as needed with increased anxiety/panic attacks as he has been able to tolerate that well.  PDMP reviewed with no red flags.

## 2024-03-25 ENCOUNTER — APPOINTMENT (EMERGENCY)
Dept: CT IMAGING | Facility: HOSPITAL | Age: 34
End: 2024-03-25
Payer: COMMERCIAL

## 2024-03-25 ENCOUNTER — HOSPITAL ENCOUNTER (EMERGENCY)
Facility: HOSPITAL | Age: 34
Discharge: HOME | End: 2024-03-25
Attending: EMERGENCY MEDICINE
Payer: COMMERCIAL

## 2024-03-25 VITALS
TEMPERATURE: 98.2 F | HEART RATE: 78 BPM | OXYGEN SATURATION: 97 % | RESPIRATION RATE: 20 BRPM | DIASTOLIC BLOOD PRESSURE: 73 MMHG | SYSTOLIC BLOOD PRESSURE: 108 MMHG

## 2024-03-25 DIAGNOSIS — E87.6 HYPOKALEMIA: ICD-10-CM

## 2024-03-25 DIAGNOSIS — E83.42 HYPOMAGNESEMIA: ICD-10-CM

## 2024-03-25 DIAGNOSIS — F10.10 ALCOHOL ABUSE: Primary | ICD-10-CM

## 2024-03-25 LAB
ALBUMIN SERPL BCP-MCNC: 5 G/DL (ref 3.5–5)
ALP SERPL-CCNC: 49 U/L (ref 34–104)
ALT SERPL W P-5'-P-CCNC: 23 U/L (ref 7–52)
ANION GAP SERPL CALCULATED.3IONS-SCNC: 14 MMOL/L (ref 4–13)
AST SERPL W P-5'-P-CCNC: 26 U/L (ref 13–39)
BASOPHILS # BLD AUTO: 0.08 THOUSANDS/ÂΜL (ref 0–0.1)
BASOPHILS NFR BLD AUTO: 1 % (ref 0–1)
BILIRUB SERPL-MCNC: 0.97 MG/DL (ref 0.2–1)
BUN SERPL-MCNC: 6 MG/DL (ref 5–25)
CALCIUM SERPL-MCNC: 10.2 MG/DL (ref 8.4–10.2)
CHLORIDE SERPL-SCNC: 90 MMOL/L (ref 96–108)
CO2 SERPL-SCNC: 31 MMOL/L (ref 21–32)
CREAT SERPL-MCNC: 1.11 MG/DL (ref 0.6–1.3)
EOSINOPHIL # BLD AUTO: 0 THOUSAND/ÂΜL (ref 0–0.61)
EOSINOPHIL NFR BLD AUTO: 0 % (ref 0–6)
ERYTHROCYTE [DISTWIDTH] IN BLOOD BY AUTOMATED COUNT: 12.8 % (ref 11.6–15.1)
ETHANOL SERPL-MCNC: <10 MG/DL
GFR SERPL CREATININE-BSD FRML MDRD: 88 ML/MIN/1.73SQ M
GLUCOSE SERPL-MCNC: 105 MG/DL (ref 65–140)
HCT VFR BLD AUTO: 44.4 % (ref 36.5–49.3)
HGB BLD-MCNC: 15.7 G/DL (ref 12–17)
IMM GRANULOCYTES # BLD AUTO: 0.01 THOUSAND/UL (ref 0–0.2)
IMM GRANULOCYTES NFR BLD AUTO: 0 % (ref 0–2)
LIPASE SERPL-CCNC: 28 U/L (ref 11–82)
LYMPHOCYTES # BLD AUTO: 0.96 THOUSANDS/ÂΜL (ref 0.6–4.47)
LYMPHOCYTES NFR BLD AUTO: 16 % (ref 14–44)
MAGNESIUM SERPL-MCNC: 1.5 MG/DL (ref 1.9–2.7)
MCH RBC QN AUTO: 31.1 PG (ref 26.8–34.3)
MCHC RBC AUTO-ENTMCNC: 35.4 G/DL (ref 31.4–37.4)
MCV RBC AUTO: 88 FL (ref 82–98)
MONOCYTES # BLD AUTO: 0.49 THOUSAND/ÂΜL (ref 0.17–1.22)
MONOCYTES NFR BLD AUTO: 8 % (ref 4–12)
NEUTROPHILS # BLD AUTO: 4.61 THOUSANDS/ÂΜL (ref 1.85–7.62)
NEUTS SEG NFR BLD AUTO: 75 % (ref 43–75)
NRBC BLD AUTO-RTO: 0 /100 WBCS
PLATELET # BLD AUTO: 268 THOUSANDS/UL (ref 149–390)
PMV BLD AUTO: 8.5 FL (ref 8.9–12.7)
POTASSIUM SERPL-SCNC: 3.1 MMOL/L (ref 3.5–5.3)
PROT SERPL-MCNC: 8.3 G/DL (ref 6.4–8.4)
RBC # BLD AUTO: 5.05 MILLION/UL (ref 3.88–5.62)
SODIUM SERPL-SCNC: 135 MMOL/L (ref 135–147)
WBC # BLD AUTO: 6.15 THOUSAND/UL (ref 4.31–10.16)

## 2024-03-25 PROCEDURE — 99285 EMERGENCY DEPT VISIT HI MDM: CPT | Performed by: EMERGENCY MEDICINE

## 2024-03-25 PROCEDURE — 80053 COMPREHEN METABOLIC PANEL: CPT | Performed by: EMERGENCY MEDICINE

## 2024-03-25 PROCEDURE — 99285 EMERGENCY DEPT VISIT HI MDM: CPT

## 2024-03-25 PROCEDURE — 96365 THER/PROPH/DIAG IV INF INIT: CPT

## 2024-03-25 PROCEDURE — 96375 TX/PRO/DX INJ NEW DRUG ADDON: CPT

## 2024-03-25 PROCEDURE — 36415 COLL VENOUS BLD VENIPUNCTURE: CPT | Performed by: EMERGENCY MEDICINE

## 2024-03-25 PROCEDURE — 83690 ASSAY OF LIPASE: CPT | Performed by: EMERGENCY MEDICINE

## 2024-03-25 PROCEDURE — 83735 ASSAY OF MAGNESIUM: CPT | Performed by: EMERGENCY MEDICINE

## 2024-03-25 PROCEDURE — 74177 CT ABD & PELVIS W/CONTRAST: CPT

## 2024-03-25 PROCEDURE — 96361 HYDRATE IV INFUSION ADD-ON: CPT

## 2024-03-25 PROCEDURE — 96366 THER/PROPH/DIAG IV INF ADDON: CPT

## 2024-03-25 PROCEDURE — 82077 ASSAY SPEC XCP UR&BREATH IA: CPT | Performed by: EMERGENCY MEDICINE

## 2024-03-25 PROCEDURE — 85025 COMPLETE CBC W/AUTO DIFF WBC: CPT | Performed by: EMERGENCY MEDICINE

## 2024-03-25 RX ORDER — LORAZEPAM 2 MG/ML
1 INJECTION INTRAMUSCULAR ONCE
Status: COMPLETED | OUTPATIENT
Start: 2024-03-25 | End: 2024-03-25

## 2024-03-25 RX ORDER — CHLORDIAZEPOXIDE HYDROCHLORIDE 25 MG/1
50 CAPSULE, GELATIN COATED ORAL 4 TIMES DAILY PRN
Qty: 30 CAPSULE | Refills: 0 | Status: SHIPPED | OUTPATIENT
Start: 2024-03-25 | End: 2024-04-24

## 2024-03-25 RX ORDER — POTASSIUM CHLORIDE 20 MEQ/1
20 TABLET, EXTENDED RELEASE ORAL 2 TIMES DAILY
Qty: 20 TABLET | Refills: 0 | Status: SHIPPED | OUTPATIENT
Start: 2024-03-25

## 2024-03-25 RX ORDER — CALCIUM CARBONATE/VITAMIN D3 500-10/5ML
400 LIQUID (ML) ORAL DAILY
Qty: 10 TABLET | Refills: 0 | Status: SHIPPED | OUTPATIENT
Start: 2024-03-25 | End: 2024-04-04

## 2024-03-25 RX ORDER — POTASSIUM CHLORIDE 20 MEQ/1
40 TABLET, EXTENDED RELEASE ORAL ONCE
Status: COMPLETED | OUTPATIENT
Start: 2024-03-25 | End: 2024-03-25

## 2024-03-25 RX ORDER — MAGNESIUM SULFATE HEPTAHYDRATE 40 MG/ML
2 INJECTION, SOLUTION INTRAVENOUS ONCE
Status: COMPLETED | OUTPATIENT
Start: 2024-03-25 | End: 2024-03-25

## 2024-03-25 RX ORDER — DROPERIDOL 2.5 MG/ML
0.62 INJECTION, SOLUTION INTRAMUSCULAR; INTRAVENOUS ONCE
Status: COMPLETED | OUTPATIENT
Start: 2024-03-25 | End: 2024-03-25

## 2024-03-25 RX ORDER — DIPHENHYDRAMINE HYDROCHLORIDE 50 MG/ML
12.5 INJECTION INTRAMUSCULAR; INTRAVENOUS ONCE
Status: COMPLETED | OUTPATIENT
Start: 2024-03-25 | End: 2024-03-25

## 2024-03-25 RX ADMIN — DROPERIDOL 0.62 MG: 2.5 INJECTION, SOLUTION INTRAMUSCULAR; INTRAVENOUS at 17:33

## 2024-03-25 RX ADMIN — LORAZEPAM 1 MG: 2 INJECTION INTRAMUSCULAR; INTRAVENOUS at 17:33

## 2024-03-25 RX ADMIN — DIPHENHYDRAMINE HYDROCHLORIDE 12.5 MG: 50 INJECTION, SOLUTION INTRAMUSCULAR; INTRAVENOUS at 17:33

## 2024-03-25 RX ADMIN — IOHEXOL 100 ML: 350 INJECTION, SOLUTION INTRAVENOUS at 17:50

## 2024-03-25 RX ADMIN — POTASSIUM CHLORIDE 40 MEQ: 1500 TABLET, EXTENDED RELEASE ORAL at 18:09

## 2024-03-25 RX ADMIN — SODIUM CHLORIDE 2000 ML: 0.9 INJECTION, SOLUTION INTRAVENOUS at 17:32

## 2024-03-25 RX ADMIN — MAGNESIUM SULFATE HEPTAHYDRATE 2 G: 40 INJECTION, SOLUTION INTRAVENOUS at 18:09

## 2024-03-25 NOTE — ED PROVIDER NOTES
History  Chief Complaint   Patient presents with    Withdrawal - Alcohol     Patient c/o alcohol withdrawal after a week long binge of vodka. last drink was 1230am last night.      This is patient's fourth visit since February for the same.  States has been on a drinking binge for the last 1 week.  Drinking alcohol.  Last alcohol intake was 1230 this morning.  Now feels nauseated.  Feels shaky.  Feels like past episodes of withdrawal.  Denies any hallucinations.  Denies any homicidal or suicidal ideation.  Complaining of left-sided abdominal pain.      History provided by:  Patient   used: No    Withdrawal - Alcohol  Similar prior episodes: yes    Severity:  Mild  Onset quality:  Gradual  Duration:  12 hours  Timing:  Constant  Progression:  Worsening  Chronicity:  Recurrent  Suspected agents:  Alcohol  Associated symptoms: abdominal pain, nausea and weakness    Associated symptoms: no blackouts, no bladder incontinence, no hallucinations, no headaches, no palpitations, no seizures, no shortness of breath, no suicidal ideation, no violence and no vomiting        Prior to Admission Medications   Prescriptions Last Dose Informant Patient Reported? Taking?   PARoxetine (PAXIL-CR) 12.5 mg 24 hr tablet   No No   Sig: Take 1 tablet (12.5 mg total) by mouth every morning   busPIRone (BUSPAR) 30 MG tablet   Yes No   Sig: Take 10 mg by mouth 3 (three) times a day   busPIRone (BUSPAR) 30 MG tablet   Yes No   Sig: Take 30 mg by mouth 2 (two) times a day   calcium carbonate (TUMS) 500 mg chewable tablet   Yes No   Sig: Chew 500 mg   chlordiazePOXIDE (LIBRIUM) 25 mg capsule   No No   Sig: Take 1 capsule (25 mg total) by mouth 3 (three) times a day as needed for anxiety   folic acid (FOLVITE) 1 mg tablet   No No   Sig: Take 1 tablet (1 mg total) by mouth daily Do not start before December 10, 2023.   thiamine 100 MG tablet   No No   Sig: Take 1 tablet (100 mg total) by mouth daily Do not start before December  10, 2023.   traZODone (DESYREL) 50 mg tablet   Yes No   Sig: Take 50 mg by mouth daily at bedtime      Facility-Administered Medications: None       Past Medical History:   Diagnosis Date    Alcohol withdrawal seizure (HCC)     Anxiety     Depression     High triglycerides     Pancreatitis        History reviewed. No pertinent surgical history.    Family History   Problem Relation Age of Onset    Heart disease Mother     Diabetes Mother      I have reviewed and agree with the history as documented.    E-Cigarette/Vaping    E-Cigarette Use Current Every Day User      E-Cigarette/Vaping Substances    Nicotine Yes      Social History     Tobacco Use    Smoking status: Never     Passive exposure: Never    Smokeless tobacco: Never   Vaping Use    Vaping status: Every Day    Substances: Nicotine   Substance Use Topics    Alcohol use: Yes     Alcohol/week: 17.0 standard drinks of alcohol     Types: 17 Shots of liquor per week    Drug use: Never       Review of Systems   Constitutional:  Negative for chills and fever.   HENT:  Negative for ear pain, hearing loss, sore throat, trouble swallowing and voice change.    Eyes:  Negative for pain and discharge.   Respiratory:  Negative for cough, shortness of breath and wheezing.    Cardiovascular:  Negative for chest pain and palpitations.   Gastrointestinal:  Positive for abdominal pain and nausea. Negative for blood in stool, constipation, diarrhea and vomiting.   Genitourinary:  Negative for bladder incontinence, dysuria, flank pain, frequency and hematuria.   Musculoskeletal:  Negative for joint swelling, neck pain and neck stiffness.   Skin:  Negative for rash and wound.   Neurological:  Positive for weakness. Negative for dizziness, seizures, syncope, facial asymmetry and headaches.   Psychiatric/Behavioral:  Negative for hallucinations, self-injury and suicidal ideas.    All other systems reviewed and are negative.      Physical Exam  Physical Exam  Vitals and nursing note  reviewed.   Constitutional:       General: He is not in acute distress.     Appearance: He is well-developed.   HENT:      Head: Normocephalic and atraumatic.      Right Ear: External ear normal.      Left Ear: External ear normal.   Eyes:      General: No scleral icterus.        Right eye: No discharge.         Left eye: No discharge.      Extraocular Movements: Extraocular movements intact.      Conjunctiva/sclera: Conjunctivae normal.   Cardiovascular:      Rate and Rhythm: Normal rate and regular rhythm.      Heart sounds: Normal heart sounds. No murmur heard.  Pulmonary:      Effort: Pulmonary effort is normal.      Breath sounds: Normal breath sounds. No wheezing or rales.   Abdominal:      General: Bowel sounds are normal. There is no distension.      Palpations: Abdomen is soft.      Tenderness: There is no abdominal tenderness. There is no guarding or rebound.   Musculoskeletal:         General: No deformity. Normal range of motion.      Cervical back: Normal range of motion and neck supple.   Skin:     General: Skin is warm and dry.      Findings: No rash.   Neurological:      General: No focal deficit present.      Mental Status: He is alert and oriented to person, place, and time.      Cranial Nerves: No cranial nerve deficit.      Comments: Slight tremor noted.   Psychiatric:         Mood and Affect: Mood normal.         Behavior: Behavior normal.         Thought Content: Thought content normal.         Judgment: Judgment normal.         Vital Signs  ED Triage Vitals [03/25/24 1708]   Temperature Pulse Respirations Blood Pressure SpO2   98.2 °F (36.8 °C) 105 20 142/87 96 %      Temp Source Heart Rate Source Patient Position - Orthostatic VS BP Location FiO2 (%)   Temporal Monitor Lying Left arm --      Pain Score       --           Vitals:    03/25/24 1708 03/25/24 1900   BP: 142/87 112/70   Pulse: 105 83   Patient Position - Orthostatic VS: Lying          Visual Acuity      ED Medications  Medications    magnesium sulfate 2 g/50 mL IVPB (premix) 2 g (2 g Intravenous New Bag 3/25/24 1809)   sodium chloride 0.9 % bolus 2,000 mL (0 mL Intravenous Stopped 3/25/24 1937)   droperidol (INAPSINE) injection 0.625 mg (0.625 mg Intravenous Given 3/25/24 1733)   diphenhydrAMINE (BENADRYL) injection 12.5 mg (12.5 mg Intravenous Given 3/25/24 1733)   LORazepam (ATIVAN) injection 1 mg (1 mg Intravenous Given 3/25/24 1733)   potassium chloride (Klor-Con M20) CR tablet 40 mEq (40 mEq Oral Given 3/25/24 1809)   iohexol (OMNIPAQUE) 350 MG/ML injection (MULTI-DOSE) 100 mL (100 mL Intravenous Given 3/25/24 1750)       Diagnostic Studies  Results Reviewed       Procedure Component Value Units Date/Time    Comprehensive metabolic panel [[id number]]  (Abnormal) Collected: 03/25/24 1726    Lab Status: Final result Specimen: Blood from Arm, Left Updated: 03/25/24 1747     Sodium 135 mmol/L      Potassium 3.1 mmol/L      Chloride 90 mmol/L      CO2 31 mmol/L      ANION GAP 14 mmol/L      BUN 6 mg/dL      Creatinine 1.11 mg/dL      Glucose 105 mg/dL      Calcium 10.2 mg/dL      AST 26 U/L      ALT 23 U/L      Alkaline Phosphatase 49 U/L      Total Protein 8.3 g/dL      Albumin 5.0 g/dL      Total Bilirubin 0.97 mg/dL      eGFR 88 ml/min/1.73sq m     Narrative:      [employer] guidelines for Chronic Kidney Disease (CKD):     Stage 1 with normal or high GFR (GFR > 90 mL/min/1.73 square meters)    Stage 2 Mild CKD (GFR = 60-89 mL/min/1.73 square meters)    Stage 3A Moderate CKD (GFR = 45-59 mL/min/1.73 square meters)    Stage 3B Moderate CKD (GFR = 30-44 mL/min/1.73 square meters)    Stage 4 Severe CKD (GFR = 15-29 mL/min/1.73 square meters)    Stage 5 End Stage CKD (GFR <15 mL/min/1.73 square meters)  Note: GFR calculation is accurate only with a steady state creatinine    Magnesium [[id number]]  (Abnormal) Collected: 03/25/24 1726    Lab Status: Final result Specimen: Blood from Arm, Left Updated: 03/25/24 0490      Magnesium 1.5 mg/dL     Lipase [[id number]]  (Normal) Collected: 03/25/24 1726    Lab Status: Final result Specimen: Blood from Arm, Left Updated: 03/25/24 1747     Lipase 28 u/L     Ethanol [[id number]]  (Normal) Collected: 03/25/24 1726    Lab Status: Final result Specimen: Blood from Arm, Left Updated: 03/25/24 1746     Ethanol Lvl <10 mg/dL     CBC and differential [[id number]]  (Abnormal) Collected: 03/25/24 1726    Lab Status: Final result Specimen: Blood from Arm, Left Updated: 03/25/24 1729     WBC 6.15 Thousand/uL      RBC 5.05 Million/uL      Hemoglobin 15.7 g/dL      Hematocrit 44.4 %      MCV 88 fL      MCH 31.1 pg      MCHC 35.4 g/dL      RDW 12.8 %      MPV 8.5 fL      Platelets 268 Thousands/uL      nRBC 0 /100 WBCs      Neutrophils Relative 75 %      Immature Grans % 0 %      Lymphocytes Relative 16 %      Monocytes Relative 8 %      Eosinophils Relative 0 %      Basophils Relative 1 %      Neutrophils Absolute 4.61 Thousands/µL      Absolute Immature Grans 0.01 Thousand/uL      Absolute Lymphocytes 0.96 Thousands/µL      Absolute Monocytes 0.49 Thousand/µL      Eosinophils Absolute 0.00 Thousand/µL      Basophils Absolute 0.08 Thousands/µL     Rapid drug screen, urine [[id number]]     Lab Status: No result Specimen: Urine                    CT abdomen pelvis with contrast   Final Result by Romero Montero DO (03/25 1934)   No CT explanation for patient's symptoms. No CT evidence for pancreatitis.   Mild fatty infiltration of liver without evidence for cirrhosis.      Workstation performed: [id number]                    Procedures  Procedures         ED Course  ED Course as of 03/25/24 1942   Mon Mar 25, 2024   1851 Seen.  Tremors resolved.  Feeling better.  Again offered detox.  Does not want to go.  States he wants to try outpatient.                                             Medical Decision Making  Amount and/or Complexity of Data Reviewed  Labs: ordered.  Radiology: ordered.    Risk  OTC  drugs.  Prescription drug management.             Disposition  Final diagnoses:   Alcohol abuse   Hypokalemia   Hypomagnesemia     Time reflects when diagnosis was documented in both MDM as applicable and the Disposition within this note       Time User Action Codes Description Comment    3/25/2024  5:48 PM Moshe Krishnamurthy [F10.10] Alcohol abuse     3/25/2024  5:48 PM Moshe Krishnamurthy Add [E87.6] Hypokalemia     3/25/2024  5:48 PM Moshe Krishnamurthy Add [E83.42] Hypomagnesemia           ED Disposition       ED Disposition   Discharge    Condition   Stable    Date/Time   Mon Mar 25, 2024  7:40 PM    Comment   Eliud Nguyen Keiryconathan discharge to home/self care.                   Follow-up Information    None         Patient's Medications   Discharge Prescriptions    CHLORDIAZEPOXIDE (LIBRIUM) 25 MG CAPSULE    Take 2 capsules (50 mg total) by mouth 4 (four) times a day as needed for withdrawal       Start Date: 3/25/2024 End Date: 4/24/2024       Order Dose: 50 mg       Quantity: 30 capsule    Refills: 0    MAGNESIUM OXIDE 400 MG CAPS    Take 1 tablet (400 mg total) by mouth in the morning for 10 days       Start Date: 3/25/2024 End Date: 4/4/2024       Order Dose: 400 mg       Quantity: 10 tablet    Refills: 0    POTASSIUM CHLORIDE (KLOR-CON M20) 20 MEQ TABLET    Take 1 tablet (20 mEq total) by mouth 2 (two) times a day       Start Date: 3/25/2024 End Date: --       Order Dose: 20 mEq       Quantity: 20 tablet    Refills: 0       No discharge procedures on file.    PDMP Review         Value Time User    PDMP Reviewed  Yes 3/18/2024  1:46 PM FARZANEH Padilla            ED Provider  Electronically Signed by             Moshe Krishnamurthy MD  03/25/24 1942

## 2024-04-07 ENCOUNTER — HOSPITAL ENCOUNTER (EMERGENCY)
Facility: HOSPITAL | Age: 34
Discharge: HOME | End: 2024-04-07
Attending: EMERGENCY MEDICINE
Payer: COMMERCIAL

## 2024-04-07 VITALS
OXYGEN SATURATION: 98 % | DIASTOLIC BLOOD PRESSURE: 90 MMHG | TEMPERATURE: 98.2 F | BODY MASS INDEX: 27.37 KG/M2 | WEIGHT: 196.21 LBS | HEART RATE: 98 BPM | RESPIRATION RATE: 16 BRPM | SYSTOLIC BLOOD PRESSURE: 124 MMHG

## 2024-04-07 DIAGNOSIS — F10.930 ALCOHOL WITHDRAWAL SYNDROME WITHOUT COMPLICATION (HCC): ICD-10-CM

## 2024-04-07 DIAGNOSIS — F10.10 ALCOHOL ABUSE: Primary | ICD-10-CM

## 2024-04-07 LAB
ALBUMIN SERPL BCP-MCNC: 4.3 G/DL (ref 3.5–5)
ALP SERPL-CCNC: 44 U/L (ref 34–104)
ALT SERPL W P-5'-P-CCNC: 21 U/L (ref 7–52)
ANION GAP SERPL CALCULATED.3IONS-SCNC: 12 MMOL/L (ref 4–13)
APTT PPP: 30 SECONDS (ref 23–37)
AST SERPL W P-5'-P-CCNC: 22 U/L (ref 13–39)
ATRIAL RATE: 94 BPM
BASOPHILS # BLD AUTO: 0.06 THOUSANDS/ÂΜL (ref 0–0.1)
BASOPHILS NFR BLD AUTO: 2 % (ref 0–1)
BILIRUB SERPL-MCNC: 0.69 MG/DL (ref 0.2–1)
BUN SERPL-MCNC: 11 MG/DL (ref 5–25)
CALCIUM SERPL-MCNC: 8.8 MG/DL (ref 8.4–10.2)
CHLORIDE SERPL-SCNC: 95 MMOL/L (ref 96–108)
CK SERPL-CCNC: 121 U/L (ref 39–308)
CO2 SERPL-SCNC: 33 MMOL/L (ref 21–32)
CREAT SERPL-MCNC: 1.09 MG/DL (ref 0.6–1.3)
EOSINOPHIL # BLD AUTO: 0.01 THOUSAND/ÂΜL (ref 0–0.61)
EOSINOPHIL NFR BLD AUTO: 0 % (ref 0–6)
ERYTHROCYTE [DISTWIDTH] IN BLOOD BY AUTOMATED COUNT: 13 % (ref 11.6–15.1)
ETHANOL SERPL-MCNC: 192 MG/DL
GFR SERPL CREATININE-BSD FRML MDRD: 89 ML/MIN/1.73SQ M
GLUCOSE SERPL-MCNC: 100 MG/DL (ref 65–140)
HCT VFR BLD AUTO: 42.3 % (ref 36.5–49.3)
HGB BLD-MCNC: 15.1 G/DL (ref 12–17)
IMM GRANULOCYTES # BLD AUTO: 0.01 THOUSAND/UL (ref 0–0.2)
IMM GRANULOCYTES NFR BLD AUTO: 0 % (ref 0–2)
INR PPP: 1.17 (ref 0.84–1.19)
LACTATE SERPL-SCNC: 3 MMOL/L (ref 0.5–2)
LACTATE SERPL-SCNC: 3.1 MMOL/L (ref 0.5–2)
LIPASE SERPL-CCNC: 34 U/L (ref 11–82)
LYMPHOCYTES # BLD AUTO: 1.35 THOUSANDS/ÂΜL (ref 0.6–4.47)
LYMPHOCYTES NFR BLD AUTO: 40 % (ref 14–44)
MAGNESIUM SERPL-MCNC: 1.9 MG/DL (ref 1.9–2.7)
MCH RBC QN AUTO: 31.8 PG (ref 26.8–34.3)
MCHC RBC AUTO-ENTMCNC: 35.7 G/DL (ref 31.4–37.4)
MCV RBC AUTO: 89 FL (ref 82–98)
MONOCYTES # BLD AUTO: 0.38 THOUSAND/ÂΜL (ref 0.17–1.22)
MONOCYTES NFR BLD AUTO: 11 % (ref 4–12)
NEUTROPHILS # BLD AUTO: 1.53 THOUSANDS/ÂΜL (ref 1.85–7.62)
NEUTS SEG NFR BLD AUTO: 47 % (ref 43–75)
NRBC BLD AUTO-RTO: 0 /100 WBCS
P AXIS: 58 DEGREES
PLATELET # BLD AUTO: 187 THOUSANDS/UL (ref 149–390)
PMV BLD AUTO: 8.4 FL (ref 8.9–12.7)
POTASSIUM SERPL-SCNC: 3.3 MMOL/L (ref 3.5–5.3)
PR INTERVAL: 180 MS
PROT SERPL-MCNC: 7 G/DL (ref 6.4–8.4)
PROTHROMBIN TIME: 15.2 SECONDS (ref 11.6–14.5)
QRS AXIS: 68 DEGREES
QRSD INTERVAL: 86 MS
QT INTERVAL: 370 MS
QTC INTERVAL: 462 MS
RBC # BLD AUTO: 4.75 MILLION/UL (ref 3.88–5.62)
SODIUM SERPL-SCNC: 140 MMOL/L (ref 135–147)
T WAVE AXIS: 65 DEGREES
VENTRICULAR RATE: 94 BPM
WBC # BLD AUTO: 3.34 THOUSAND/UL (ref 4.31–10.16)

## 2024-04-07 PROCEDURE — 85025 COMPLETE CBC W/AUTO DIFF WBC: CPT | Performed by: EMERGENCY MEDICINE

## 2024-04-07 PROCEDURE — 83735 ASSAY OF MAGNESIUM: CPT | Performed by: EMERGENCY MEDICINE

## 2024-04-07 PROCEDURE — 99285 EMERGENCY DEPT VISIT HI MDM: CPT | Performed by: EMERGENCY MEDICINE

## 2024-04-07 PROCEDURE — 82550 ASSAY OF CK (CPK): CPT | Performed by: EMERGENCY MEDICINE

## 2024-04-07 PROCEDURE — 82077 ASSAY SPEC XCP UR&BREATH IA: CPT | Performed by: EMERGENCY MEDICINE

## 2024-04-07 PROCEDURE — 93005 ELECTROCARDIOGRAM TRACING: CPT

## 2024-04-07 PROCEDURE — 83605 ASSAY OF LACTIC ACID: CPT | Performed by: EMERGENCY MEDICINE

## 2024-04-07 PROCEDURE — 83690 ASSAY OF LIPASE: CPT | Performed by: EMERGENCY MEDICINE

## 2024-04-07 PROCEDURE — 85610 PROTHROMBIN TIME: CPT | Performed by: EMERGENCY MEDICINE

## 2024-04-07 PROCEDURE — 85730 THROMBOPLASTIN TIME PARTIAL: CPT | Performed by: EMERGENCY MEDICINE

## 2024-04-07 PROCEDURE — 93010 ELECTROCARDIOGRAM REPORT: CPT | Performed by: INTERNAL MEDICINE

## 2024-04-07 PROCEDURE — 80053 COMPREHEN METABOLIC PANEL: CPT | Performed by: EMERGENCY MEDICINE

## 2024-04-07 PROCEDURE — 36415 COLL VENOUS BLD VENIPUNCTURE: CPT | Performed by: EMERGENCY MEDICINE

## 2024-04-07 RX ORDER — ONDANSETRON 2 MG/ML
4 INJECTION INTRAMUSCULAR; INTRAVENOUS ONCE
Status: COMPLETED | OUTPATIENT
Start: 2024-04-07 | End: 2024-04-07

## 2024-04-07 RX ORDER — DROPERIDOL 2.5 MG/ML
1.25 INJECTION, SOLUTION INTRAMUSCULAR; INTRAVENOUS ONCE
Status: COMPLETED | OUTPATIENT
Start: 2024-04-07 | End: 2024-04-07

## 2024-04-07 RX ORDER — NICOTINE 21 MG/24HR
21 PATCH, TRANSDERMAL 24 HOURS TRANSDERMAL ONCE
Status: DISCONTINUED | OUTPATIENT
Start: 2024-04-07 | End: 2024-04-07 | Stop reason: HOSPADM

## 2024-04-07 RX ORDER — POTASSIUM CHLORIDE 20 MEQ/1
40 TABLET, EXTENDED RELEASE ORAL ONCE
Status: COMPLETED | OUTPATIENT
Start: 2024-04-07 | End: 2024-04-07

## 2024-04-07 RX ORDER — DIPHENHYDRAMINE HYDROCHLORIDE 50 MG/ML
12.5 INJECTION INTRAMUSCULAR; INTRAVENOUS ONCE
Status: COMPLETED | OUTPATIENT
Start: 2024-04-07 | End: 2024-04-07

## 2024-04-07 RX ORDER — DIAZEPAM 5 MG/ML
2.5 INJECTION, SOLUTION INTRAMUSCULAR; INTRAVENOUS ONCE
Status: COMPLETED | OUTPATIENT
Start: 2024-04-07 | End: 2024-04-07

## 2024-04-07 RX ORDER — CHLORDIAZEPOXIDE HYDROCHLORIDE 25 MG/1
50 CAPSULE, GELATIN COATED ORAL 4 TIMES DAILY PRN
Qty: 20 CAPSULE | Refills: 0 | Status: SHIPPED | OUTPATIENT
Start: 2024-04-07

## 2024-04-07 RX ADMIN — NICOTINE 21 MG: 21 PATCH, EXTENDED RELEASE TRANSDERMAL at 10:10

## 2024-04-07 RX ADMIN — DROPERIDOL 1.25 MG: 2.5 INJECTION, SOLUTION INTRAMUSCULAR; INTRAVENOUS at 09:57

## 2024-04-07 RX ADMIN — SODIUM CHLORIDE 2000 ML: 0.9 INJECTION, SOLUTION INTRAVENOUS at 09:51

## 2024-04-07 RX ADMIN — DIAZEPAM 2.5 MG: 5 INJECTION INTRAMUSCULAR; INTRAVENOUS at 09:57

## 2024-04-07 RX ADMIN — DIPHENHYDRAMINE HYDROCHLORIDE 12.5 MG: 50 INJECTION, SOLUTION INTRAMUSCULAR; INTRAVENOUS at 09:57

## 2024-04-07 RX ADMIN — POTASSIUM CHLORIDE 40 MEQ: 1500 TABLET, EXTENDED RELEASE ORAL at 10:40

## 2024-04-07 RX ADMIN — ONDANSETRON 4 MG: 2 INJECTION INTRAMUSCULAR; INTRAVENOUS at 09:57

## 2024-04-07 NOTE — ED CARE HANDOFF
[facility] Warm Handoff Outcome Note    Patient name Eliud Clarke  Location ED TR 13/TR 13B MRN [id number]  Age: 33 y.o.          Plan Type:  Warm Handoff                                                                                    Plan Date: 4/7/2024  Service:  ED Warm Handoff      Substance Use History:  Alcohol    Warm Handoff Update:  Accepted OP services with [facility] in [place], [place].    Warm Handoff Outcome: Outpatient

## 2024-04-07 NOTE — ED NOTES
Pt. A&Ox4 and in no acute distress.  Pt. Refusing inpatient detox program at this time.  Pt. Is agreeable to outpatient treatment that is set up for tomorrow at [address] [facility] in [place], [place], [address] [facility] [address] [phone].  Pt. Provided with information and Veralized understanding.      Ovi Agee RN  04/07/24 [id number]

## 2024-04-07 NOTE — ED NOTES
No return call made from [facility] representative.  Telephone call made to [facility].  Left message on voicemail to return call to ED to speak with patient      Ovi Agee RN  04/07/24 7014

## 2024-04-07 NOTE — ED PROVIDER NOTES
History  Chief Complaint   Patient presents with    Withdrawal - Alcohol     Pt states he was drinking for about a week, last drink around 2100 last night. Looking for detox.     Patient well-known to this facility for multiple visits for the same.  Has been drinking vodka daily.  Last drink was 930 last night.  Now complains of nausea vomiting.  Decreased appetite.  Feels weak and shaky.  States he is only had 2 full meals in the last 1 week.  Denies any other drug use.  No suicidal or homicidal ideation.  No seizures.      History provided by:  Patient   used: No    Withdrawal - Alcohol  Similar prior episodes: yes    Severity:  Moderate  Onset quality:  Gradual  Duration:  1 day  Timing:  Constant  Progression:  Worsening  Chronicity:  Recurrent  Suspected agents:  Alcohol  Associated symptoms: nausea, vomiting and weakness    Associated symptoms: no abdominal pain, no agitation, no hallucinations, no headaches, no palpitations, no seizures, no shortness of breath, no somnolence and no suicidal ideation        Prior to Admission Medications   Prescriptions Last Dose Informant Patient Reported? Taking?   PARoxetine (PAXIL-CR) 12.5 mg 24 hr tablet   No No   Sig: Take 1 tablet (12.5 mg total) by mouth every morning   busPIRone (BUSPAR) 30 MG tablet   Yes No   Sig: Take 10 mg by mouth 3 (three) times a day   busPIRone (BUSPAR) 30 MG tablet   Yes No   Sig: Take 30 mg by mouth 2 (two) times a day   calcium carbonate (TUMS) 500 mg chewable tablet   Yes No   Sig: Chew 500 mg   chlordiazePOXIDE (LIBRIUM) 25 mg capsule   No No   Sig: Take 1 capsule (25 mg total) by mouth 3 (three) times a day as needed for anxiety   chlordiazePOXIDE (LIBRIUM) 25 mg capsule   No No   Sig: Take 2 capsules (50 mg total) by mouth 4 (four) times a day as needed for withdrawal   folic acid (FOLVITE) 1 mg tablet   No No   Sig: Take 1 tablet (1 mg total) by mouth daily Do not start before December 10, 2023.   potassium chloride  (Klor-Con M20) 20 mEq tablet   No No   Sig: Take 1 tablet (20 mEq total) by mouth 2 (two) times a day   thiamine 100 MG tablet   No No   Sig: Take 1 tablet (100 mg total) by mouth daily Do not start before December 10, 2023.   traZODone (DESYREL) 50 mg tablet   Yes No   Sig: Take 50 mg by mouth daily at bedtime      Facility-Administered Medications: None       Past Medical History:   Diagnosis Date    Alcohol withdrawal seizure (HCC)     Anxiety     Depression     High triglycerides     Pancreatitis        History reviewed. No pertinent surgical history.    Family History   Problem Relation Age of Onset    Heart disease Mother     Diabetes Mother      I have reviewed and agree with the history as documented.    E-Cigarette/Vaping    E-Cigarette Use Current Every Day User      E-Cigarette/Vaping Substances    Nicotine Yes      Social History     Tobacco Use    Smoking status: Never     Passive exposure: Never    Smokeless tobacco: Never   Vaping Use    Vaping status: Every Day    Substances: Nicotine   Substance Use Topics    Alcohol use: Yes     Alcohol/week: 17.0 standard drinks of alcohol     Types: 17 Shots of liquor per week    Drug use: Never       Review of Systems   Constitutional:  Positive for activity change. Negative for chills and fever.   HENT:  Negative for ear pain, hearing loss, sore throat, trouble swallowing and voice change.    Eyes:  Negative for pain and discharge.   Respiratory:  Negative for cough, shortness of breath and wheezing.    Cardiovascular:  Negative for chest pain and palpitations.   Gastrointestinal:  Positive for nausea and vomiting. Negative for abdominal pain, blood in stool, constipation and diarrhea.   Genitourinary:  Negative for dysuria, flank pain, frequency and hematuria.   Musculoskeletal:  Negative for joint swelling, neck pain and neck stiffness.   Skin:  Negative for rash and wound.   Neurological:  Positive for weakness and light-headedness. Negative for dizziness,  seizures, syncope, facial asymmetry and headaches.   Psychiatric/Behavioral:  Negative for agitation, hallucinations, self-injury and suicidal ideas.    All other systems reviewed and are negative.      Physical Exam  Physical Exam  Vitals and nursing note reviewed.   Constitutional:       General: He is not in acute distress.     Appearance: He is well-developed.   HENT:      Head: Normocephalic and atraumatic.      Right Ear: External ear normal.      Left Ear: External ear normal.      Mouth/Throat:      Mouth: Mucous membranes are dry.   Eyes:      General: No scleral icterus.        Right eye: No discharge.         Left eye: No discharge.      Extraocular Movements: Extraocular movements intact.      Conjunctiva/sclera: Conjunctivae normal.   Cardiovascular:      Rate and Rhythm: Normal rate and regular rhythm.      Heart sounds: Normal heart sounds. No murmur heard.  Pulmonary:      Effort: Pulmonary effort is normal.      Breath sounds: Normal breath sounds. No wheezing or rales.   Abdominal:      General: Bowel sounds are normal. There is no distension.      Palpations: Abdomen is soft.      Tenderness: There is no abdominal tenderness. There is no guarding or rebound.   Musculoskeletal:         General: No deformity. Normal range of motion.      Cervical back: Normal range of motion and neck supple.   Skin:     General: Skin is warm and dry.      Findings: No rash.   Neurological:      General: No focal deficit present.      Mental Status: He is alert and oriented to person, place, and time.      Cranial Nerves: No cranial nerve deficit.   Psychiatric:         Mood and Affect: Mood normal.         Behavior: Behavior normal.         Thought Content: Thought content normal.         Judgment: Judgment normal.         Vital Signs  ED Triage Vitals [04/07/24 0939]   Temperature Pulse Respirations Blood Pressure SpO2   98.2 °F (36.8 °C) (!) 120 18 125/96 96 %      Temp Source Heart Rate Source Patient Position -  Orthostatic VS BP Location FiO2 (%)   Temporal Monitor Sitting Left arm --      Pain Score       --           Vitals:    04/07/24 0939 04/07/24 1041   BP: 125/96 124/90   Pulse: (!) 120 98   Patient Position - Orthostatic VS: Sitting Lying         Visual Acuity      ED Medications  Medications   nicotine (NICODERM CQ) 21 mg/24 hr TD 24 hr patch 21 mg (21 mg Transdermal Medication Applied 4/7/24 1010)   sodium chloride 0.9 % bolus 2,000 mL (0 mL Intravenous Stopped 4/7/24 1041)   ondansetron (ZOFRAN) injection 4 mg (4 mg Intravenous Given 4/7/24 0957)   diazepam (VALIUM) injection 2.5 mg (2.5 mg Intravenous Given 4/7/24 0957)   droperidol (INAPSINE) injection 1.25 mg (1.25 mg Intravenous Given 4/7/24 0957)   diphenhydrAMINE (BENADRYL) injection 12.5 mg (12.5 mg Intravenous Given 4/7/24 0957)   potassium chloride (Klor-Con M20) CR tablet 40 mEq (40 mEq Oral Given 4/7/24 1040)       Diagnostic Studies  Results Reviewed       Procedure Component Value Units Date/Time    Lactic acid 2 Hours [[id number]]  (Abnormal) Collected: 04/07/24 1052    Lab Status: Final result Specimen: Blood from Arm, Left Updated: 04/07/24 1120     LACTIC ACID 3.0 mmol/L     Narrative:      Result may be elevated if tourniquet was used during collection.    Ethanol [[id number]]  (Abnormal) Collected: 04/07/24 1052    Lab Status: Final result Specimen: Blood from Arm, Left Updated: 04/07/24 1115     Ethanol Lvl 192 mg/dL     Lactic acid, plasma (w/reflex if result > 2.0) [[id number]]  (Abnormal) Collected: 04/07/24 1006    Lab Status: Final result Specimen: Blood from Arm, Left Updated: 04/07/24 1036     LACTIC ACID 3.1 mmol/L     Narrative:      Result may be elevated if tourniquet was used during collection.    Comprehensive metabolic panel [[id number]]  (Abnormal) Collected: 04/07/24 1006    Lab Status: Final result Specimen: Blood from Arm, Left Updated: 04/07/24 1035     Sodium 140 mmol/L      Potassium 3.3 mmol/L      Chloride 95 mmol/L       CO2 33 mmol/L      ANION GAP 12 mmol/L      BUN 11 mg/dL      Creatinine 1.09 mg/dL      Glucose 100 mg/dL      Calcium 8.8 mg/dL      AST 22 U/L      ALT 21 U/L      Alkaline Phosphatase 44 U/L      Total Protein 7.0 g/dL      Albumin 4.3 g/dL      Total Bilirubin 0.69 mg/dL      eGFR 89 ml/min/1.73sq m     Narrative:      [employer] guidelines for Chronic Kidney Disease (CKD):     Stage 1 with normal or high GFR (GFR > 90 mL/min/1.73 square meters)    Stage 2 Mild CKD (GFR = 60-89 mL/min/1.73 square meters)    Stage 3A Moderate CKD (GFR = 45-59 mL/min/1.73 square meters)    Stage 3B Moderate CKD (GFR = 30-44 mL/min/1.73 square meters)    Stage 4 Severe CKD (GFR = 15-29 mL/min/1.73 square meters)    Stage 5 End Stage CKD (GFR <15 mL/min/1.73 square meters)  Note: GFR calculation is accurate only with a steady state creatinine    Magnesium [[id number]]  (Normal) Collected: 04/07/24 1006    Lab Status: Final result Specimen: Blood from Arm, Left Updated: 04/07/24 1035     Magnesium 1.9 mg/dL     Lipase [[id number]]  (Normal) Collected: 04/07/24 1006    Lab Status: Final result Specimen: Blood from Arm, Left Updated: 04/07/24 1035     Lipase 34 u/L     CK [[id number]]  (Normal) Collected: 04/07/24 1006    Lab Status: Final result Specimen: Blood from Arm, Left Updated: 04/07/24 1035     Total  U/L     Protime-INR [[id number]]  (Abnormal) Collected: 04/07/24 1006    Lab Status: Final result Specimen: Blood from Arm, Left Updated: 04/07/24 1028     Protime 15.2 seconds      INR 1.17    APTT [[id number]]  (Normal) Collected: 04/07/24 1006    Lab Status: Final result Specimen: Blood from Arm, Left Updated: 04/07/24 1028     PTT 30 seconds     CBC and differential [[id number]]  (Abnormal) Collected: 04/07/24 1006    Lab Status: Final result Specimen: Blood from Arm, Left Updated: 04/07/24 1014     WBC 3.34 Thousand/uL      RBC 4.75 Million/uL      Hemoglobin 15.1 g/dL      Hematocrit 42.3 %      MCV  89 fL      MCH 31.8 pg      MCHC 35.7 g/dL      RDW 13.0 %      MPV 8.4 fL      Platelets 187 Thousands/uL      nRBC 0 /100 WBCs      Neutrophils Relative 47 %      Immature Grans % 0 %      Lymphocytes Relative 40 %      Monocytes Relative 11 %      Eosinophils Relative 0 %      Basophils Relative 2 %      Neutrophils Absolute 1.53 Thousands/µL      Absolute Immature Grans 0.01 Thousand/uL      Absolute Lymphocytes 1.35 Thousands/µL      Absolute Monocytes 0.38 Thousand/µL      Eosinophils Absolute 0.01 Thousand/µL      Basophils Absolute 0.06 Thousands/µL     Rapid drug screen, urine [[id number]]     Lab Status: No result Specimen: Urine                    No orders to display              Procedures  ECG 12 Lead Documentation Only    Date/Time: 4/7/2024 9:50 AM    Performed by: Moshe Krishnamurthy MD  Authorized by: Moshe Krishnamurthy MD    ECG reviewed by me, the ED Provider: yes    Patient location:  ED  Rate:     ECG rate:  100  Rhythm:     Rhythm: sinus rhythm    Ectopy:     Ectopy: none    QRS:     QRS axis:  Normal           ED Course  ED Course as of 04/07/24 1246   Sun Apr 07, 2024   1040 Patient offered detox at [place].  Refuses to go.  Is willing to talk to warm handoff.   1244 Patient talked to warm handoff.  Does not want inpatient treatment.  Will try outpatient.  Again offered detox.  Patient does not want to go.                               SBIRT 22yo+      Flowsheet Row Most Recent Value   Initial Alcohol Screen: US AUDIT-C     1. How often do you have a drink containing alcohol? 6  [3 large bottles of vodka over week] Filed at: 04/07/2024 0941   Audit-C Score 6 Filed at: 04/07/2024 0941   ANNALISE: How many times in the past year have you...    Used an illegal drug or used a prescription medication for non-medical reasons? Never Filed at: 04/07/2024 0941                      Medical Decision Making  Amount and/or Complexity of Data Reviewed  Labs: ordered.    Risk  OTC drugs.  Prescription drug  management.             Disposition  Final diagnoses:   Alcohol abuse   Alcohol withdrawal syndrome without complication (HCC)     Time reflects when diagnosis was documented in both MDM as applicable and the Disposition within this note       Time User Action Codes Description Comment    4/7/2024 12:44 PM Moshe Krishnamurthy [F10.10] Alcohol abuse     4/7/2024 12:44 PM Moshe Krishnamurthy Add [F10.930] Alcohol withdrawal syndrome without complication (HCC)           ED Disposition       ED Disposition   Discharge    Condition   Stable    Date/Time   Sun Apr 7, 2024 1244    Comment   Eliud Clarke discharge to home/self care.                   Follow-up Information       Follow up With Specialties Details Why Contact Info    [facility] Padilla [facility], Nurse Practitioner Call in 1 day  [address] [facility] [address] [phone]              Patient's Medications   Discharge Prescriptions    CHLORDIAZEPOXIDE (LIBRIUM) 25 MG CAPSULE    Take 2 capsules (50 mg total) by mouth 4 (four) times a day as needed for withdrawal for up to 20 doses       Start Date: 4/7/2024  End Date: --       Order Dose: 50 mg       Quantity: 20 capsule    Refills: 0       No discharge procedures on file.    PDMP Review         Value Time User    PDMP Reviewed  Yes 3/18/2024  1:46 PM FARZANEH Padilla            ED Provider  Electronically Signed by             Moshe Krishnamurthy MD  04/07/24 3225

## 2024-04-07 NOTE — ED NOTES
"Pt. Is requesting to speak with [facility] representative in one hour after medications wear off so he is not as \"sleepy.\" Telephone call made to [facility] and made them aware.  Per [facility] representative she will call ED in an hour to speak with patient      Ovi Agee RN  04/07/24 6840    "

## 2024-04-09 DIAGNOSIS — F41.9 ANXIETY: ICD-10-CM

## 2024-04-09 RX ORDER — PAROXETINE HYDROCHLORIDE HEMIHYDRATE 12.5 MG/1
12.5 TABLET, FILM COATED, EXTENDED RELEASE ORAL EVERY MORNING
Qty: 90 TABLET | Refills: 1 | Status: SHIPPED | OUTPATIENT
Start: 2024-04-09

## 2024-04-10 DIAGNOSIS — F41.9 ANXIETY AND DEPRESSION: ICD-10-CM

## 2024-04-10 DIAGNOSIS — G47.09 OTHER INSOMNIA: ICD-10-CM

## 2024-04-10 DIAGNOSIS — F32.A ANXIETY AND DEPRESSION: ICD-10-CM

## 2024-04-10 RX ORDER — BUSPIRONE HYDROCHLORIDE 10 MG/1
10 TABLET ORAL 3 TIMES DAILY
Qty: 270 TABLET | Refills: 1 | Status: SHIPPED | OUTPATIENT
Start: 2024-04-10 | End: 2024-10-07

## 2024-04-10 RX ORDER — TRAZODONE HYDROCHLORIDE 50 MG/1
50 TABLET ORAL NIGHTLY PRN
Qty: 90 TABLET | Refills: 1 | Status: SHIPPED | OUTPATIENT
Start: 2024-04-10 | End: 2024-10-07

## 2024-04-10 NOTE — TELEPHONE ENCOUNTER
"Medicine Refill Request    Last Office Visit: 1/9/2024   Last Consult Visit: Visit date not found  Last Telemedicine Visit: 3/2/2021 Javier Murphy MD    Next Appointment: 5/6/2024      Current Outpatient Medications:     busPIRone (BUSPAR) 10 mg tablet, Take 1 tablet (10 mg total) by mouth 3 (three) times a day., Disp: 270 tablet, Rfl: 1    calcium carbonate (TUMS) 200 mg calcium (500 mg) chewable tablet, Take 500 mg by mouth., Disp: , Rfl:     escitalopram (LEXAPRO) 20 mg tablet, Take 1 tablet (20 mg total) by mouth daily., Disp: 90 tablet, Rfl: 1    melatonin 5 mg capsule, Take 5 mg by mouth nightly., Disp: , Rfl:     multivitamin with minerals tablet, Take 1 tablet by mouth daily., Disp: , Rfl:     thiamine 100 mg tablet, Take 100 mg by mouth daily., Disp: , Rfl:     traZODone (DESYREL) 50 mg tablet, Take 1 tablet (50 mg total) by mouth nightly as needed (insomnia)., Disp: 90 tablet, Rfl: 1      BP Readings from Last 3 Encounters:   01/09/24 128/80   09/22/23 123/68   06/12/23 120/78       Recent Lab results:  Lab Results   Component Value Date    CHOL 170 09/28/2021   ,   Lab Results   Component Value Date    HDL 46 09/28/2021   ,   Lab Results   Component Value Date    LDLCALC 112 (H) 09/28/2021   ,   Lab Results   Component Value Date    TRIG 59 09/28/2021        Lab Results   Component Value Date    GLUCOSE 96 02/13/2023   , No results found for: \"HGBA1C\"      Lab Results   Component Value Date    CREATININE 0.8 02/13/2023       Lab Results   Component Value Date    TSH 0.801 11/04/2020         No results found for: \"HGBA1C\"  "

## 2024-04-18 ENCOUNTER — OFFICE VISIT (OUTPATIENT)
Dept: FAMILY MEDICINE CLINIC | Facility: CLINIC | Age: 34
End: 2024-04-18
Payer: COMMERCIAL

## 2024-04-18 VITALS
WEIGHT: 197.2 LBS | BODY MASS INDEX: 27.61 KG/M2 | OXYGEN SATURATION: 98 % | HEIGHT: 71 IN | HEART RATE: 83 BPM | SYSTOLIC BLOOD PRESSURE: 110 MMHG | DIASTOLIC BLOOD PRESSURE: 78 MMHG

## 2024-04-18 DIAGNOSIS — F10.930 ALCOHOL WITHDRAWAL SYNDROME WITHOUT COMPLICATION (HCC): ICD-10-CM

## 2024-04-18 DIAGNOSIS — F51.04 PSYCHOPHYSIOLOGICAL INSOMNIA: ICD-10-CM

## 2024-04-18 DIAGNOSIS — F41.9 ANXIETY: ICD-10-CM

## 2024-04-18 DIAGNOSIS — F17.290 NICOTINE DEPENDENCE DUE TO VAPING TOBACCO PRODUCT: ICD-10-CM

## 2024-04-18 DIAGNOSIS — F10.10 ALCOHOL ABUSE: Primary | ICD-10-CM

## 2024-04-18 DIAGNOSIS — F10.20 ALCOHOL USE DISORDER, SEVERE, DEPENDENCE (HCC): ICD-10-CM

## 2024-04-18 PROBLEM — Z72.0 TOBACCO ABUSE: Status: RESOLVED | Noted: 2021-10-16 | Resolved: 2024-04-18

## 2024-04-18 PROBLEM — E87.6 HYPOKALEMIA: Status: RESOLVED | Noted: 2021-10-17 | Resolved: 2024-04-18

## 2024-04-18 PROCEDURE — 99214 OFFICE O/P EST MOD 30 MIN: CPT | Performed by: NURSE PRACTITIONER

## 2024-04-18 RX ORDER — CHLORDIAZEPOXIDE HYDROCHLORIDE 25 MG/1
50 CAPSULE, GELATIN COATED ORAL 3 TIMES DAILY PRN
Qty: 45 CAPSULE | Refills: 0 | Status: SHIPPED | OUTPATIENT
Start: 2024-04-18

## 2024-04-18 RX ORDER — PAROXETINE HYDROCHLORIDE HEMIHYDRATE 25 MG/1
25 TABLET, FILM COATED, EXTENDED RELEASE ORAL EVERY MORNING
Qty: 90 TABLET | Refills: 1 | Status: SHIPPED | OUTPATIENT
Start: 2024-04-18

## 2024-04-18 NOTE — PROGRESS NOTES
Name: Eliud Clarke      : 1990      MRN: [id number]  Encounter Provider: FARZANEH Padilla  Encounter Date: 2024   Encounter department: [facility]    Assessment & Plan     1. Alcohol abuse  -     chlordiazePOXIDE (LIBRIUM) 25 mg capsule; Take 2 capsules (50 mg total) by mouth 3 (three) times a day as needed for withdrawal    2. Anxiety  Assessment & Plan:  Remains more controlled with librium as needed.  Was taken off the Lexapro and transitioned to Paxil - currently on a low dose for the past 4 weeks - does not feel any change with that dose.  Will increase his dose to 25 mg.      Orders:  -     PARoxetine (PAXIL-CR) 25 mg 24 hr tablet; Take 1 tablet (25 mg total) by mouth every morning    3. Nicotine dependence due to vaping tobacco product  Assessment & Plan:  Transitioned from smoking cigarettes to vaping.        4. Alcohol withdrawal syndrome without complication (HCC)  Assessment & Plan:  Continues with Librium as needed.        5. Alcohol use disorder, severe, dependence (HCC)    6. Psychophysiological insomnia  Assessment & Plan:  Reports he takes trazodone, benadryl and melatonin 5 mg nightly - has been taking this for some time - notes it still works well - continue at this time.               Subjective      Here today for a follow-up - hx of ETOH abuse and anxiety.  Currently is sober, taking librium PRN daily as needed.    Has no new concerns at this time.            Review of Systems   Constitutional: Negative.    Respiratory: Negative.     Cardiovascular: Negative.    Neurological: Negative.    Psychiatric/Behavioral:  Positive for sleep disturbance. The patient is nervous/anxious.    All other systems reviewed and are negative.      Current Outpatient Medications on File Prior to Visit   Medication Sig    busPIRone (BUSPAR) 30 MG tablet Take 10 mg by mouth 3 (three) times a day    calcium carbonate (TUMS) 500 mg chewable tablet Chew 500 mg     "potassium chloride (Klor-Con M20) 20 mEq tablet Take 1 tablet (20 mEq total) by mouth 2 (two) times a day    thiamine 100 MG tablet Take 1 tablet (100 mg total) by mouth daily Do not start before December 10, 2023.    traZODone (DESYREL) 50 mg tablet Take 50 mg by mouth daily at bedtime    [DISCONTINUED] busPIRone (BUSPAR) 30 MG tablet Take 30 mg by mouth 2 (two) times a day    [DISCONTINUED] chlordiazePOXIDE (LIBRIUM) 25 mg capsule Take 2 capsules (50 mg total) by mouth 4 (four) times a day as needed for withdrawal    [DISCONTINUED] chlordiazePOXIDE (LIBRIUM) 25 mg capsule Take 2 capsules (50 mg total) by mouth 4 (four) times a day as needed for withdrawal for up to 20 doses    [DISCONTINUED] PARoxetine (PAXIL-CR) 12.5 mg 24 hr tablet TAKE 1 TABLET BY MOUTH EVERY MORNING    [DISCONTINUED] chlordiazePOXIDE (LIBRIUM) 25 mg capsule Take 1 capsule (25 mg total) by mouth 3 (three) times a day as needed for anxiety    [DISCONTINUED] folic acid (FOLVITE) 1 mg tablet Take 1 tablet (1 mg total) by mouth daily Do not start before December 10, 2023. (Patient not taking: Reported on 4/18/2024)       Objective     /78 (BP Location: Left arm, Patient Position: Sitting, Cuff Size: Large)   Pulse 83   Ht 5' 11\" (1.803 m)   Wt 89.4 kg (197 lb 3.2 oz)   SpO2 98%   BMI 27.50 kg/m²     Physical Exam  Constitutional:       Appearance: Normal appearance.   Neurological:      Mental Status: He is alert.   Psychiatric:         Mood and Affect: Mood normal.         Behavior: Behavior normal.         Thought Content: Thought content normal.         Judgment: Judgment normal.       FARZANEH Padilla    "

## 2024-04-18 NOTE — ASSESSMENT & PLAN NOTE
Remains more controlled with librium as needed.  Was taken off the Lexapro and transitioned to Paxil - currently on a low dose for the past 4 weeks - does not feel any change with that dose.  Will increase his dose to 25 mg.

## 2024-04-18 NOTE — ASSESSMENT & PLAN NOTE
Reports he takes trazodone, benadryl and melatonin 5 mg nightly - has been taking this for some time - notes it still works well - continue at this time.

## 2024-05-28 DIAGNOSIS — F41.9 ANXIETY: Primary | ICD-10-CM

## 2024-05-30 RX ORDER — TRAZODONE HYDROCHLORIDE 50 MG/1
50 TABLET ORAL
Qty: 90 TABLET | Refills: 1 | Status: SHIPPED | OUTPATIENT
Start: 2024-05-30

## 2024-06-18 ENCOUNTER — HOSPITAL ENCOUNTER (EMERGENCY)
Facility: HOSPITAL | Age: 34
Discharge: HOME | End: 2024-06-19
Attending: EMERGENCY MEDICINE
Payer: COMMERCIAL

## 2024-06-18 VITALS
HEART RATE: 103 BPM | SYSTOLIC BLOOD PRESSURE: 133 MMHG | DIASTOLIC BLOOD PRESSURE: 63 MMHG | BODY MASS INDEX: 27.92 KG/M2 | TEMPERATURE: 98.8 F | OXYGEN SATURATION: 93 % | RESPIRATION RATE: 18 BRPM | WEIGHT: 200.18 LBS

## 2024-06-18 DIAGNOSIS — F10.929 ALCOHOL INTOXICATION (HCC): ICD-10-CM

## 2024-06-18 DIAGNOSIS — F10.10 ALCOHOL ABUSE: Primary | ICD-10-CM

## 2024-06-18 LAB
ALBUMIN SERPL BCP-MCNC: 4.8 G/DL (ref 3.5–5)
ALP SERPL-CCNC: 47 U/L (ref 34–104)
ALT SERPL W P-5'-P-CCNC: 37 U/L (ref 7–52)
AMPHETAMINES SERPL QL SCN: NEGATIVE
ANION GAP SERPL CALCULATED.3IONS-SCNC: 16 MMOL/L (ref 4–13)
APTT PPP: 27 SECONDS (ref 23–37)
AST SERPL W P-5'-P-CCNC: 58 U/L (ref 13–39)
BACTERIA UR QL AUTO: NORMAL /HPF
BARBITURATES UR QL: NEGATIVE
BASOPHILS # BLD AUTO: 0.06 THOUSANDS/ÂΜL (ref 0–0.1)
BASOPHILS NFR BLD AUTO: 1 % (ref 0–1)
BENZODIAZ UR QL: NEGATIVE
BILIRUB SERPL-MCNC: 0.83 MG/DL (ref 0.2–1)
BILIRUB UR QL STRIP: NEGATIVE
BUN SERPL-MCNC: 11 MG/DL (ref 5–25)
CALCIUM SERPL-MCNC: 9.3 MG/DL (ref 8.4–10.2)
CHLORIDE SERPL-SCNC: 93 MMOL/L (ref 96–108)
CLARITY UR: CLEAR
CO2 SERPL-SCNC: 29 MMOL/L (ref 21–32)
COCAINE UR QL: NEGATIVE
COLOR UR: YELLOW
CREAT SERPL-MCNC: 1.02 MG/DL (ref 0.6–1.3)
EOSINOPHIL # BLD AUTO: 0.2 THOUSAND/ÂΜL (ref 0–0.61)
EOSINOPHIL NFR BLD AUTO: 4 % (ref 0–6)
ERYTHROCYTE [DISTWIDTH] IN BLOOD BY AUTOMATED COUNT: 12.1 % (ref 11.6–15.1)
ETHANOL SERPL-MCNC: 279 MG/DL
FENTANYL UR QL SCN: NEGATIVE
GFR SERPL CREATININE-BSD FRML MDRD: 97 ML/MIN/1.73SQ M
GLUCOSE SERPL-MCNC: 85 MG/DL (ref 65–140)
GLUCOSE UR STRIP-MCNC: NEGATIVE MG/DL
HCT VFR BLD AUTO: 42.3 % (ref 36.5–49.3)
HGB BLD-MCNC: 15 G/DL (ref 12–17)
HGB UR QL STRIP.AUTO: ABNORMAL
HYDROCODONE UR QL SCN: NEGATIVE
IMM GRANULOCYTES # BLD AUTO: 0.02 THOUSAND/UL (ref 0–0.2)
IMM GRANULOCYTES NFR BLD AUTO: 0 % (ref 0–2)
INR PPP: 0.95 (ref 0.84–1.19)
KETONES UR STRIP-MCNC: ABNORMAL MG/DL
LEUKOCYTE ESTERASE UR QL STRIP: NEGATIVE
LIPASE SERPL-CCNC: 21 U/L (ref 11–82)
LYMPHOCYTES # BLD AUTO: 1.72 THOUSANDS/ÂΜL (ref 0.6–4.47)
LYMPHOCYTES NFR BLD AUTO: 30 % (ref 14–44)
MAGNESIUM SERPL-MCNC: 1.9 MG/DL (ref 1.9–2.7)
MCH RBC QN AUTO: 30.9 PG (ref 26.8–34.3)
MCHC RBC AUTO-ENTMCNC: 35.5 G/DL (ref 31.4–37.4)
MCV RBC AUTO: 87 FL (ref 82–98)
METHADONE UR QL: NEGATIVE
MONOCYTES # BLD AUTO: 0.3 THOUSAND/ÂΜL (ref 0.17–1.22)
MONOCYTES NFR BLD AUTO: 5 % (ref 4–12)
NEUTROPHILS # BLD AUTO: 3.45 THOUSANDS/ÂΜL (ref 1.85–7.62)
NEUTS SEG NFR BLD AUTO: 60 % (ref 43–75)
NITRITE UR QL STRIP: NEGATIVE
NON-SQ EPI CELLS URNS QL MICRO: NORMAL /HPF
NRBC BLD AUTO-RTO: 0 /100 WBCS
OPIATES UR QL SCN: NEGATIVE
OXYCODONE+OXYMORPHONE UR QL SCN: NEGATIVE
PCP UR QL: NEGATIVE
PH UR STRIP.AUTO: 6 [PH]
PLATELET # BLD AUTO: 249 THOUSANDS/UL (ref 149–390)
PMV BLD AUTO: 8.4 FL (ref 8.9–12.7)
POTASSIUM SERPL-SCNC: 3.8 MMOL/L (ref 3.5–5.3)
PROT SERPL-MCNC: 7.6 G/DL (ref 6.4–8.4)
PROT UR STRIP-MCNC: NEGATIVE MG/DL
PROTHROMBIN TIME: 13 SECONDS (ref 11.6–14.5)
RBC # BLD AUTO: 4.86 MILLION/UL (ref 3.88–5.62)
RBC #/AREA URNS AUTO: NORMAL /HPF
SODIUM SERPL-SCNC: 138 MMOL/L (ref 135–147)
SP GR UR STRIP.AUTO: 1.01 (ref 1–1.03)
THC UR QL: NEGATIVE
UROBILINOGEN UR QL STRIP.AUTO: 0.2 E.U./DL
WBC # BLD AUTO: 5.75 THOUSAND/UL (ref 4.31–10.16)
WBC #/AREA URNS AUTO: NORMAL /HPF

## 2024-06-18 PROCEDURE — 96365 THER/PROPH/DIAG IV INF INIT: CPT

## 2024-06-18 PROCEDURE — 82077 ASSAY SPEC XCP UR&BREATH IA: CPT | Performed by: EMERGENCY MEDICINE

## 2024-06-18 PROCEDURE — 85610 PROTHROMBIN TIME: CPT | Performed by: EMERGENCY MEDICINE

## 2024-06-18 PROCEDURE — 80053 COMPREHEN METABOLIC PANEL: CPT | Performed by: EMERGENCY MEDICINE

## 2024-06-18 PROCEDURE — 99284 EMERGENCY DEPT VISIT MOD MDM: CPT

## 2024-06-18 PROCEDURE — 99285 EMERGENCY DEPT VISIT HI MDM: CPT | Performed by: EMERGENCY MEDICINE

## 2024-06-18 PROCEDURE — 36415 COLL VENOUS BLD VENIPUNCTURE: CPT | Performed by: EMERGENCY MEDICINE

## 2024-06-18 PROCEDURE — 83735 ASSAY OF MAGNESIUM: CPT | Performed by: EMERGENCY MEDICINE

## 2024-06-18 PROCEDURE — 80307 DRUG TEST PRSMV CHEM ANLYZR: CPT | Performed by: EMERGENCY MEDICINE

## 2024-06-18 PROCEDURE — 96366 THER/PROPH/DIAG IV INF ADDON: CPT

## 2024-06-18 PROCEDURE — 96375 TX/PRO/DX INJ NEW DRUG ADDON: CPT

## 2024-06-18 PROCEDURE — 96368 THER/DIAG CONCURRENT INF: CPT

## 2024-06-18 PROCEDURE — 93005 ELECTROCARDIOGRAM TRACING: CPT

## 2024-06-18 PROCEDURE — 85025 COMPLETE CBC W/AUTO DIFF WBC: CPT | Performed by: EMERGENCY MEDICINE

## 2024-06-18 PROCEDURE — 85730 THROMBOPLASTIN TIME PARTIAL: CPT | Performed by: EMERGENCY MEDICINE

## 2024-06-18 PROCEDURE — 81001 URINALYSIS AUTO W/SCOPE: CPT | Performed by: EMERGENCY MEDICINE

## 2024-06-18 PROCEDURE — 96376 TX/PRO/DX INJ SAME DRUG ADON: CPT

## 2024-06-18 PROCEDURE — 83690 ASSAY OF LIPASE: CPT | Performed by: EMERGENCY MEDICINE

## 2024-06-18 RX ORDER — ONDANSETRON 2 MG/ML
4 INJECTION INTRAMUSCULAR; INTRAVENOUS ONCE
Status: COMPLETED | OUTPATIENT
Start: 2024-06-18 | End: 2024-06-18

## 2024-06-18 RX ORDER — DIAZEPAM 5 MG/ML
10 INJECTION, SOLUTION INTRAMUSCULAR; INTRAVENOUS ONCE
Status: COMPLETED | OUTPATIENT
Start: 2024-06-18 | End: 2024-06-18

## 2024-06-18 RX ORDER — SODIUM CHLORIDE, SODIUM GLUCONATE, SODIUM ACETATE, POTASSIUM CHLORIDE, MAGNESIUM CHLORIDE, SODIUM PHOSPHATE, DIBASIC, AND POTASSIUM PHOSPHATE .53; .5; .37; .037; .03; .012; .00082 G/100ML; G/100ML; G/100ML; G/100ML; G/100ML; G/100ML; G/100ML
2000 INJECTION, SOLUTION INTRAVENOUS ONCE
Status: COMPLETED | OUTPATIENT
Start: 2024-06-18 | End: 2024-06-19

## 2024-06-18 RX ADMIN — ONDANSETRON 4 MG: 2 INJECTION INTRAMUSCULAR; INTRAVENOUS at 22:25

## 2024-06-18 RX ADMIN — SODIUM CHLORIDE, SODIUM GLUCONATE, SODIUM ACETATE, POTASSIUM CHLORIDE, MAGNESIUM CHLORIDE, SODIUM PHOSPHATE, DIBASIC, AND POTASSIUM PHOSPHATE 2000 ML: .53; .5; .37; .037; .03; .012; .00082 INJECTION, SOLUTION INTRAVENOUS at 22:25

## 2024-06-18 RX ADMIN — DIAZEPAM 10 MG: 5 INJECTION INTRAMUSCULAR; INTRAVENOUS at 22:25

## 2024-06-18 RX ADMIN — FOLIC ACID 1 MG: 5 INJECTION, SOLUTION INTRAMUSCULAR; INTRAVENOUS; SUBCUTANEOUS at 22:41

## 2024-06-18 RX ADMIN — DIAZEPAM 10 MG: 5 INJECTION INTRAMUSCULAR; INTRAVENOUS at 23:36

## 2024-06-18 RX ADMIN — THIAMINE HYDROCHLORIDE 100 MG: 100 INJECTION, SOLUTION INTRAMUSCULAR; INTRAVENOUS at 22:41

## 2024-06-19 LAB
ATRIAL RATE: 96 BPM
P AXIS: 60 DEGREES
PR INTERVAL: 186 MS
QRS AXIS: 78 DEGREES
QRSD INTERVAL: 82 MS
QT INTERVAL: 370 MS
QTC INTERVAL: 467 MS
T WAVE AXIS: 63 DEGREES
VENTRICULAR RATE: 96 BPM

## 2024-06-19 PROCEDURE — 93010 ELECTROCARDIOGRAM REPORT: CPT | Performed by: INTERNAL MEDICINE

## 2024-06-19 NOTE — ED PROVIDER NOTES
History  Chief Complaint   Patient presents with    Alcohol Intoxication     Patient presents to the ED for alcohol detox. The patient states that he relapsed on Friday after being sober for two months. Patient admits to drinking today, but states amount is unknown. Patient states he does not want to go to an inpatient treatment center at this time.      Patient is a 33-year-old male with history of alcohol abuse prior alcohol withdrawal seizures anxiety and depression and pancreatitis presents to the emergency department due to relapse on alcohol over the past week and since Friday has been drinking about 2 handles of liquor.  Patient's last drink was today around 2 PM now feels shaky has had nausea and vomiting.  Patient concerned for withdrawal symptoms and wants medical treatment and stabilization to help prevent serious withdrawal symptoms patient was seen by PCP earlier today and prescribed Librium which she took however this did not help his symptoms.  Patient denies any SI or HI patient declines warm handoff in the ED states that he tried this before it did not help patient also does not want to seek inpatient detox treatment or drug and alcohol rehab placement currently.        History provided by:  Patient and friend  Alcohol Intoxication  Associated symptoms: nausea and vomiting    Associated symptoms: no abdominal pain, no headaches, no seizures, no shortness of breath and no weakness        Prior to Admission Medications   Prescriptions Last Dose Informant Patient Reported? Taking?   PARoxetine (PAXIL-CR) 25 mg 24 hr tablet   No No   Sig: Take 1 tablet (25 mg total) by mouth every morning   busPIRone (BUSPAR) 30 MG tablet   Yes No   Sig: Take 10 mg by mouth 3 (three) times a day   calcium carbonate (TUMS) 500 mg chewable tablet   Yes No   Sig: Chew 500 mg   chlordiazePOXIDE (LIBRIUM) 25 mg capsule   No No   Sig: Take 2 capsules (50 mg total) by mouth 3 (three) times a day as needed for withdrawal    potassium chloride (Klor-Con M20) 20 mEq tablet   No No   Sig: Take 1 tablet (20 mEq total) by mouth 2 (two) times a day   thiamine 100 MG tablet   No No   Sig: Take 1 tablet (100 mg total) by mouth daily Do not start before December 10, 2023.   traZODone (DESYREL) 50 mg tablet   No No   Sig: Take 1 tablet (50 mg total) by mouth daily at bedtime      Facility-Administered Medications: None       Past Medical History:   Diagnosis Date    Alcohol withdrawal seizure (HCC)     Anxiety     Depression     High triglycerides     Pancreatitis        History reviewed. No pertinent surgical history.    Family History   Problem Relation Age of Onset    Heart disease Mother     Diabetes Mother      I have reviewed and agree with the history as documented.    E-Cigarette/Vaping    E-Cigarette Use Current Every Day User      E-Cigarette/Vaping Substances    Nicotine Yes     THC No     CBD No     Flavoring No     Other No     Unknown No      Social History     Tobacco Use    Smoking status: Never     Passive exposure: Never    Smokeless tobacco: Never   Vaping Use    Vaping status: Every Day    Substances: Nicotine   Substance Use Topics    Alcohol use: Yes     Alcohol/week: 17.0 standard drinks of alcohol     Types: 17 Shots of liquor per week    Drug use: Never       Review of Systems   Constitutional:  Negative for chills and fever.   Respiratory:  Negative for shortness of breath.    Cardiovascular:  Negative for chest pain.   Gastrointestinal:  Positive for nausea and vomiting. Negative for abdominal pain and diarrhea.   Neurological:  Positive for tremors. Negative for seizures, weakness, numbness and headaches.   All other systems reviewed and are negative.      Physical Exam  Physical Exam  Vitals and nursing note reviewed.   Constitutional:       General: He is not in acute distress.     Appearance: Normal appearance.   HENT:      Head: Normocephalic and atraumatic.      Nose: Nose normal.   Eyes:       Conjunctiva/sclera: Conjunctivae normal.   Cardiovascular:      Rate and Rhythm: Regular rhythm. Tachycardia present.   Pulmonary:      Effort: Pulmonary effort is normal. No respiratory distress.   Skin:     General: Skin is dry.   Neurological:      General: No focal deficit present.      Mental Status: He is alert and oriented to person, place, and time.         Vital Signs  ED Triage Vitals [06/18/24 2156]   Temperature Pulse Respirations Blood Pressure SpO2   98.8 °F (37.1 °C) 105 18 133/63 93 %      Temp Source Heart Rate Source Patient Position - Orthostatic VS BP Location FiO2 (%)   Temporal Monitor Lying Left arm --      Pain Score       No Pain           Vitals:    06/18/24 2156 06/18/24 2200   BP: 133/63 133/63   Pulse: 105 103   Patient Position - Orthostatic VS: Lying          Visual Acuity      ED Medications  Medications   thiamine (VITAMIN B1) 100 mg in sodium chloride 0.9 % 50 mL IVPB (0 mg Intravenous Stopped 6/18/24 2311)   multi-electrolyte (ISOLYTE-S PH 7.4) bolus 2,000 mL (2,000 mL Intravenous New Bag 6/18/24 2225)   folic acid 1 mg in sodium chloride 0.9 % 50 mL IVPB (0 mg Intravenous Stopped 6/18/24 2311)   diazepam (VALIUM) injection 10 mg (10 mg Intravenous Given 6/18/24 2225)   ondansetron (ZOFRAN) injection 4 mg (4 mg Intravenous Given 6/18/24 2225)   diazepam (VALIUM) injection 10 mg (10 mg Intravenous Given 6/18/24 2336)       Diagnostic Studies  Results Reviewed       Procedure Component Value Units Date/Time    Rapid drug screen, urine [[id number]]  (Normal) Collected: 06/18/24 2226    Lab Status: Final result Specimen: Urine, Clean Catch Updated: 06/18/24 2349     Amph/Meth UR Negative     Barbiturate Ur Negative     Benzodiazepine Urine Negative     Cocaine Urine Negative     Methadone Urine Negative     Opiate Urine Negative     PCP Ur Negative     THC Urine Negative     Oxycodone Urine Negative     Fentanyl Urine Negative     HYDROCODONE URINE Negative    Narrative:      FOR  MEDICAL PURPOSES ONLY.   IF CONFIRMATION NEEDED PLEASE CONTACT THE LAB WITHIN 5 DAYS.    Drug Screen Cutoff Levels:  AMPHETAMINE/METHAMPHETAMINES  1000 ng/mL  BARBITURATES     200 ng/mL  BENZODIAZEPINES     200 ng/mL  COCAINE      300 ng/mL  METHADONE      300 ng/mL  OPIATES      300 ng/mL  PHENCYCLIDINE     25 ng/mL  THC       50 ng/mL  OXYCODONE      100 ng/mL  FENTANYL      5 ng/mL  HYDROCODONE     300 ng/mL    Urine Microscopic [[id number]]  (Normal) Collected: 06/18/24 2226    Lab Status: Final result Specimen: Urine, Clean Catch Updated: 06/18/24 2309     RBC, UA 0-1 /hpf      WBC, UA None Seen /hpf      Epithelial Cells None Seen /hpf      Bacteria, UA Occasional /hpf     Ethanol [[id number]]  (Abnormal) Collected: 06/18/24 2226    Lab Status: Final result Specimen: Blood from Arm, Right Updated: 06/18/24 2256     Ethanol Lvl 279 mg/dL     Lipase [[id number]]  (Normal) Collected: 06/18/24 2226    Lab Status: Final result Specimen: Blood from Arm, Right Updated: 06/18/24 2256     Lipase 21 u/L     Comprehensive metabolic panel [[id number]]  (Abnormal) Collected: 06/18/24 2226    Lab Status: Final result Specimen: Blood from Arm, Right Updated: 06/18/24 2256     Sodium 138 mmol/L      Potassium 3.8 mmol/L      Chloride 93 mmol/L      CO2 29 mmol/L      ANION GAP 16 mmol/L      BUN 11 mg/dL      Creatinine 1.02 mg/dL      Glucose 85 mg/dL      Calcium 9.3 mg/dL      AST 58 U/L      ALT 37 U/L      Alkaline Phosphatase 47 U/L      Total Protein 7.6 g/dL      Albumin 4.8 g/dL      Total Bilirubin 0.83 mg/dL      eGFR 97 ml/min/1.73sq m     Narrative:      National Kid[employer] Foundation guidelines for Chronic Kidney Disease (CKD):     Stage 1 with normal or high GFR (GFR > 90 mL/min/1.73 square meters)    Stage 2 Mild CKD (GFR = 60-89 mL/min/1.73 square meters)    Stage 3A Moderate CKD (GFR = 45-59 mL/min/1.73 square meters)    Stage 3B Moderate CKD (GFR = 30-44 mL/min/1.73 square meters)    Stage 4 Severe CKD (GFR  = 15-29 mL/min/1.73 square meters)    Stage 5 End Stage CKD (GFR <15 mL/min/1.73 square meters)  Note: GFR calculation is accurate only with a steady state creatinine    Magnesium [[id number]]  (Normal) Collected: 06/18/24 2226    Lab Status: Final result Specimen: Blood from Arm, Right Updated: 06/18/24 2256     Magnesium 1.9 mg/dL     Protime-INR [[id number]]  (Normal) Collected: 06/18/24 2226    Lab Status: Final result Specimen: Blood from Arm, Right Updated: 06/18/24 2252     Protime 13.0 seconds      INR 0.95    APTT [[id number]]  (Normal) Collected: 06/18/24 2226    Lab Status: Final result Specimen: Blood from Arm, Right Updated: 06/18/24 2252     PTT 27 seconds     UA w Reflex to Microscopic w Reflex to Culture [[id number]]  (Abnormal) Collected: 06/18/24 2226    Lab Status: Final result Specimen: Urine, Clean Catch Updated: 06/18/24 2245     Color, UA Yellow     Clarity, UA Clear     Specific Gravity, UA 1.010     pH, UA 6.0     Leukocytes, UA Negative     Nitrite, UA Negative     Protein, UA Negative mg/dl      Glucose, UA Negative mg/dl      Ketones, UA Trace mg/dl      Urobilinogen, UA 0.2 E.U./dl      Bilirubin, UA Negative     Occult Blood, UA Small    CBC and differential [[id number]]  (Abnormal) Collected: 06/18/24 2226    Lab Status: Final result Specimen: Blood from Arm, Right Updated: 06/18/24 2240     WBC 5.75 Thousand/uL      RBC 4.86 Million/uL      Hemoglobin 15.0 g/dL      Hematocrit 42.3 %      MCV 87 fL      MCH 30.9 pg      MCHC 35.5 g/dL      RDW 12.1 %      MPV 8.4 fL      Platelets 249 Thousands/uL      nRBC 0 /100 WBCs      Segmented % 60 %      Immature Grans % 0 %      Lymphocytes % 30 %      Monocytes % 5 %      Eosinophils Relative 4 %      Basophils Relative 1 %      Absolute Neutrophils 3.45 Thousands/µL      Absolute Immature Grans 0.02 Thousand/uL      Absolute Lymphocytes 1.72 Thousands/µL      Absolute Monocytes 0.30 Thousand/µL      Eosinophils Absolute 0.20 Thousand/µL       Basophils Absolute 0.06 Thousands/µL                    No orders to display              Procedures  ECG 12 Lead Documentation Only    Date/Time: 6/18/2024 10:19 PM    Performed by: Shaheen Hart DO  Authorized by: Shaheen Hart DO    ECG reviewed by me, the ED Provider: yes    Patient location:  ED  Previous ECG:     Comparison to cardiac monitor: Yes    Quality:     Tracing quality:  Limited by artifact  Rate:     ECG rate:  96    ECG rate assessment: normal    Rhythm:     Rhythm: sinus rhythm    QRS:     QRS axis:  Right    QRS intervals:  Normal  Conduction:     Conduction: normal    ST segments:     ST segments:  Normal  T waves:     T waves: normal             ED Course                               SBIRT 22yo+      Flowsheet Row Most Recent Value   Initial Alcohol Screen: US AUDIT-C     1. How often do you have a drink containing alcohol? 6 Filed at: 06/18/2024 2157   2. How many drinks containing alcohol do you have on a typical day you are drinking?  6 Filed at: 06/18/2024 2157   3a. Male UNDER 65: How often do you have five or more drinks on one occasion? 6 Filed at: 06/18/2024 2157   Audit-C Score 18 Filed at: 06/18/2024 2157   Full Alcohol Screen: US AUDIT    4. How often during the last year have you found that you were not able to stop drinking once you had started? 4 Filed at: 06/18/2024 2157   5. How often during past year have you failed to do what was normally expected of you because of drinking?  4 Filed at: 06/18/2024 2157   6. How often in past year have you needed a first drink in the morning to get yourself going after a heavy drinking session?  4 Filed at: 06/18/2024 2157   7. How often in past year have you had feeling of guilt or remorse after drinking?  3 Filed at: 06/18/2024 2157   8. How often in past year have you been unable to remember what happened night before because you had been drinking?  2 Filed at: 06/18/2024 2157   9. Have you or someone else been injured as a result of  your drinking?  0 Filed at: 06/18/2024 2157   10. Has a relative, friend, doctor or other health worker been concerned about your drinking and suggested you cut down?  4 Filed at: 06/18/2024 2157   AUDIT Total Score 39 Filed at: 06/18/2024 2157   ANNALISE: How many times in the past year have you...    Used an illegal drug or used a prescription medication for non-medical reasons? Never Filed at: 06/18/2024 2157                      Medical Decision Making  Differential diagnosis included but not limited to alcohol withdrawal alcohol intoxication dehydration nausea and vomiting electrolyte derangement.  Patient remained clinically and hemodynamically stable in the emergency department.  Workup in the ED reveals no evidence of acute infection or significant electrolyte derangement.  Patient with no signs or symptoms of severe withdrawal or DTs in the ED he remained stable and wished to return home felt improved after IV fluids and Valium given in the ED patient wishes to return home and self treat for alcohol withdrawal symptoms with the Librium he was recently prescribed.  Patient declined warm handoff referral or inpatient detox placement at this time wishes to return home advised supportive care Librium as needed for alcohol withdrawal symptoms and prompt follow-up with primary physician for further evaluation and treatment return precautions and anticipatory guidance discussed.      Problems Addressed:  Alcohol abuse: acute illness or injury  Alcohol intoxication (HCC): acute illness or injury    Amount and/or Complexity of Data Reviewed  Labs: ordered. Decision-making details documented in ED Course.  ECG/medicine tests: ordered and independent interpretation performed. Decision-making details documented in ED Course.    Risk  Prescription drug management.             Disposition  Final diagnoses:   Alcohol abuse   Alcohol intoxication (HCC)     Time reflects when diagnosis was documented in both MDM as applicable  and the Disposition within this note       Time User Action Codes Description Comment    6/18/2024 11:32 PM Shaheen Hart Add [F10.10] Alcohol abuse     6/18/2024 11:32 PM Shaheen Hart Add [F10.929] Alcohol intoxication (HCC)           ED Disposition       ED Disposition   Discharge    Condition   Stable    Date/Time   Tue Jun 18, 2024 2332    Comment   Eliud Edward Stencovage discharge to home/self care.                   Follow-up Information       Follow up With Specialties Details Why Contact Info    [facility], Nurse Practitioner Schedule an appointment as soon as possible for a visit in 2 days  [address] [facility] [address]  [phone]      [facility] [employer] [employer] [employer]  Call today As needed [address] [facility] [address]  [phone]              Patient's Medications   Discharge Prescriptions    No medications on file       No discharge procedures on file.    PDMP Review         Value Time User    PDMP Reviewed  Yes 4/18/2024  8:28 AM FARZANEH Padilla            ED Provider  Electronically Signed by             Shaheen Hart DO  06/18/24 3113

## 2024-06-21 DIAGNOSIS — F10.10 ALCOHOL ABUSE: ICD-10-CM

## 2024-06-23 RX ORDER — CHLORDIAZEPOXIDE HYDROCHLORIDE 25 MG/1
50 CAPSULE, GELATIN COATED ORAL 3 TIMES DAILY PRN
Qty: 45 CAPSULE | Refills: 0 | Status: SHIPPED | OUTPATIENT
Start: 2024-06-23

## 2024-07-04 ENCOUNTER — HOSPITAL ENCOUNTER (EMERGENCY)
Facility: HOSPITAL | Age: 34
Discharge: HOME | End: 2024-07-04
Attending: EMERGENCY MEDICINE
Payer: COMMERCIAL

## 2024-07-04 VITALS
HEART RATE: 101 BPM | RESPIRATION RATE: 18 BRPM | DIASTOLIC BLOOD PRESSURE: 68 MMHG | BODY MASS INDEX: 28.92 KG/M2 | WEIGHT: 206.57 LBS | HEIGHT: 71 IN | TEMPERATURE: 99 F | SYSTOLIC BLOOD PRESSURE: 117 MMHG | OXYGEN SATURATION: 92 %

## 2024-07-04 DIAGNOSIS — F10.929 ALCOHOL INTOXICATION (HCC): Primary | ICD-10-CM

## 2024-07-04 LAB
ALBUMIN SERPL BCG-MCNC: 4.5 G/DL (ref 3.5–5)
ALP SERPL-CCNC: 40 U/L (ref 34–104)
ALT SERPL W P-5'-P-CCNC: 38 U/L (ref 7–52)
ANION GAP SERPL CALCULATED.3IONS-SCNC: 8 MMOL/L (ref 4–13)
AST SERPL W P-5'-P-CCNC: 32 U/L (ref 13–39)
BASOPHILS # BLD AUTO: 0.04 THOUSANDS/ÂΜL (ref 0–0.1)
BASOPHILS NFR BLD AUTO: 2 % (ref 0–1)
BILIRUB SERPL-MCNC: 0.35 MG/DL (ref 0.2–1)
BUN SERPL-MCNC: 14 MG/DL (ref 5–25)
CALCIUM SERPL-MCNC: 9 MG/DL (ref 8.4–10.2)
CHLORIDE SERPL-SCNC: 106 MMOL/L (ref 96–108)
CO2 SERPL-SCNC: 30 MMOL/L (ref 21–32)
CREAT SERPL-MCNC: 0.96 MG/DL (ref 0.6–1.3)
EOSINOPHIL # BLD AUTO: 0.02 THOUSAND/ÂΜL (ref 0–0.61)
EOSINOPHIL NFR BLD AUTO: 1 % (ref 0–6)
ERYTHROCYTE [DISTWIDTH] IN BLOOD BY AUTOMATED COUNT: 13.6 % (ref 11.6–15.1)
ETHANOL SERPL-MCNC: 239 MG/DL
GFR SERPL CREATININE-BSD FRML MDRD: 104 ML/MIN/1.73SQ M
GLUCOSE SERPL-MCNC: 106 MG/DL (ref 65–140)
HCT VFR BLD AUTO: 41.3 % (ref 36.5–49.3)
HGB BLD-MCNC: 14.2 G/DL (ref 12–17)
IMM GRANULOCYTES # BLD AUTO: 0.03 THOUSAND/UL (ref 0–0.2)
IMM GRANULOCYTES NFR BLD AUTO: 1 % (ref 0–2)
LYMPHOCYTES # BLD AUTO: 1.06 THOUSANDS/ÂΜL (ref 0.6–4.47)
LYMPHOCYTES NFR BLD AUTO: 39 % (ref 14–44)
MCH RBC QN AUTO: 31.5 PG (ref 26.8–34.3)
MCHC RBC AUTO-ENTMCNC: 34.4 G/DL (ref 31.4–37.4)
MCV RBC AUTO: 92 FL (ref 82–98)
MONOCYTES # BLD AUTO: 0.15 THOUSAND/ÂΜL (ref 0.17–1.22)
MONOCYTES NFR BLD AUTO: 6 % (ref 4–12)
NEUTROPHILS # BLD AUTO: 1.45 THOUSANDS/ÂΜL (ref 1.85–7.62)
NEUTS SEG NFR BLD AUTO: 51 % (ref 43–75)
NRBC BLD AUTO-RTO: 0 /100 WBCS
PLATELET # BLD AUTO: 198 THOUSANDS/UL (ref 149–390)
PMV BLD AUTO: 8.5 FL (ref 8.9–12.7)
POTASSIUM SERPL-SCNC: 3.5 MMOL/L (ref 3.5–5.3)
PROT SERPL-MCNC: 7.2 G/DL (ref 6.4–8.4)
RBC # BLD AUTO: 4.51 MILLION/UL (ref 3.88–5.62)
SODIUM SERPL-SCNC: 144 MMOL/L (ref 135–147)
WBC # BLD AUTO: 2.75 THOUSAND/UL (ref 4.31–10.16)

## 2024-07-04 PROCEDURE — 36415 COLL VENOUS BLD VENIPUNCTURE: CPT | Performed by: EMERGENCY MEDICINE

## 2024-07-04 PROCEDURE — 82077 ASSAY SPEC XCP UR&BREATH IA: CPT | Performed by: EMERGENCY MEDICINE

## 2024-07-04 PROCEDURE — 99284 EMERGENCY DEPT VISIT MOD MDM: CPT | Performed by: EMERGENCY MEDICINE

## 2024-07-04 PROCEDURE — 80053 COMPREHEN METABOLIC PANEL: CPT | Performed by: EMERGENCY MEDICINE

## 2024-07-04 PROCEDURE — 85025 COMPLETE CBC W/AUTO DIFF WBC: CPT | Performed by: EMERGENCY MEDICINE
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
## 2024-07-11 ENCOUNTER — HOSPITAL ENCOUNTER (INPATIENT)
Facility: HOSPITAL | Age: 34
LOS: 1 days | Discharge: LEFT BEFORE DISCHARGE | DRG: 894 | End: 2024-07-12
Attending: EMERGENCY MEDICINE | Admitting: HOSPITALIST
Payer: COMMERCIAL

## 2024-07-11 DIAGNOSIS — F10.929 ALCOHOLIC INTOXICATION WITH COMPLICATION (HCC): ICD-10-CM

## 2024-07-11 DIAGNOSIS — F41.9 ANXIETY: ICD-10-CM

## 2024-07-11 DIAGNOSIS — F10.939 ALCOHOL WITHDRAWAL (HCC): ICD-10-CM

## 2024-07-11 DIAGNOSIS — F10.929 ALCOHOL INTOXICATION (HCC): Primary | ICD-10-CM

## 2024-07-11 LAB
ALBUMIN SERPL BCG-MCNC: 4.8 G/DL (ref 3.5–5)
ALP SERPL-CCNC: 45 U/L (ref 34–104)
ALT SERPL W P-5'-P-CCNC: 35 U/L (ref 7–52)
ANION GAP SERPL CALCULATED.3IONS-SCNC: 10 MMOL/L (ref 4–13)
AST SERPL W P-5'-P-CCNC: 51 U/L (ref 13–39)
ATRIAL RATE: 81 BPM
BASOPHILS # BLD AUTO: 0.03 THOUSANDS/ÂΜL (ref 0–0.1)
BASOPHILS NFR BLD AUTO: 1 % (ref 0–1)
BILIRUB SERPL-MCNC: 0.73 MG/DL (ref 0.2–1)
BUN SERPL-MCNC: 9 MG/DL (ref 5–25)
CALCIUM SERPL-MCNC: 8.3 MG/DL (ref 8.4–10.2)
CHLORIDE SERPL-SCNC: 104 MMOL/L (ref 96–108)
CO2 SERPL-SCNC: 26 MMOL/L (ref 21–32)
CREAT SERPL-MCNC: 0.9 MG/DL (ref 0.6–1.3)
EOSINOPHIL # BLD AUTO: 0.02 THOUSAND/ÂΜL (ref 0–0.61)
EOSINOPHIL NFR BLD AUTO: 1 % (ref 0–6)
ERYTHROCYTE [DISTWIDTH] IN BLOOD BY AUTOMATED COUNT: 13.2 % (ref 11.6–15.1)
ETHANOL SERPL-MCNC: 312 MG/DL
GFR SERPL CREATININE-BSD FRML MDRD: 113 ML/MIN/1.73SQ M
GLUCOSE SERPL-MCNC: 83 MG/DL (ref 65–140)
HCT VFR BLD AUTO: 44.5 % (ref 36.5–49.3)
HGB BLD-MCNC: 15.4 G/DL (ref 12–17)
IMM GRANULOCYTES # BLD AUTO: 0.01 THOUSAND/UL (ref 0–0.2)
IMM GRANULOCYTES NFR BLD AUTO: 0 % (ref 0–2)
LIPASE SERPL-CCNC: 40 U/L (ref 11–82)
LYMPHOCYTES # BLD AUTO: 1.77 THOUSANDS/ÂΜL (ref 0.6–4.47)
LYMPHOCYTES NFR BLD AUTO: 62 % (ref 14–44)
MCH RBC QN AUTO: 31.1 PG (ref 26.8–34.3)
MCHC RBC AUTO-ENTMCNC: 34.6 G/DL (ref 31.4–37.4)
MCV RBC AUTO: 90 FL (ref 82–98)
MONOCYTES # BLD AUTO: 0.24 THOUSAND/ÂΜL (ref 0.17–1.22)
MONOCYTES NFR BLD AUTO: 9 % (ref 4–12)
NEUTROPHILS # BLD AUTO: 0.77 THOUSANDS/ÂΜL (ref 1.85–7.62)
NEUTS SEG NFR BLD AUTO: 27 % (ref 43–75)
NRBC BLD AUTO-RTO: 0 /100 WBCS
P AXIS: 59 DEGREES
PLATELET # BLD AUTO: 219 THOUSANDS/UL (ref 149–390)
PMV BLD AUTO: 8.8 FL (ref 8.9–12.7)
POTASSIUM SERPL-SCNC: 5 MMOL/L (ref 3.5–5.3)
PR INTERVAL: 184 MS
PROT SERPL-MCNC: 7.3 G/DL (ref 6.4–8.4)
QRS AXIS: 100 DEGREES
QRSD INTERVAL: 88 MS
QT INTERVAL: 410 MS
QTC INTERVAL: 476 MS
RBC # BLD AUTO: 4.95 MILLION/UL (ref 3.88–5.62)
SODIUM SERPL-SCNC: 140 MMOL/L (ref 135–147)
T WAVE AXIS: 41 DEGREES
VENTRICULAR RATE: 81 BPM
WBC # BLD AUTO: 2.84 THOUSAND/UL (ref 4.31–10.16)

## 2024-07-11 PROCEDURE — 36415 COLL VENOUS BLD VENIPUNCTURE: CPT | Performed by: EMERGENCY MEDICINE

## 2024-07-11 PROCEDURE — 80053 COMPREHEN METABOLIC PANEL: CPT | Performed by: EMERGENCY MEDICINE

## 2024-07-11 PROCEDURE — 96365 THER/PROPH/DIAG IV INF INIT: CPT

## 2024-07-11 PROCEDURE — 85025 COMPLETE CBC W/AUTO DIFF WBC: CPT | Performed by: EMERGENCY MEDICINE

## 2024-07-11 PROCEDURE — 93010 ELECTROCARDIOGRAM REPORT: CPT | Performed by: STUDENT IN AN ORGANIZED HEALTH CARE EDUCATION/TRAINING PROGRAM

## 2024-07-11 PROCEDURE — 99223 1ST HOSP IP/OBS HIGH 75: CPT | Performed by: HOSPITALIST

## 2024-07-11 PROCEDURE — 96375 TX/PRO/DX INJ NEW DRUG ADDON: CPT

## 2024-07-11 PROCEDURE — 83690 ASSAY OF LIPASE: CPT | Performed by: EMERGENCY MEDICINE

## 2024-07-11 PROCEDURE — 99285 EMERGENCY DEPT VISIT HI MDM: CPT | Performed by: EMERGENCY MEDICINE

## 2024-07-11 PROCEDURE — 99284 EMERGENCY DEPT VISIT MOD MDM: CPT

## 2024-07-11 PROCEDURE — 93005 ELECTROCARDIOGRAM TRACING: CPT

## 2024-07-11 PROCEDURE — 82077 ASSAY SPEC XCP UR&BREATH IA: CPT | Performed by: EMERGENCY MEDICINE

## 2024-07-11 RX ORDER — LANOLIN ALCOHOL/MO/W.PET/CERES
100 CREAM (GRAM) TOPICAL DAILY
Status: DISCONTINUED | OUTPATIENT
Start: 2024-07-12 | End: 2024-07-12 | Stop reason: HOSPADM

## 2024-07-11 RX ORDER — ONDANSETRON 2 MG/ML
4 INJECTION INTRAMUSCULAR; INTRAVENOUS EVERY 6 HOURS PRN
Status: DISCONTINUED | OUTPATIENT
Start: 2024-07-11 | End: 2024-07-12 | Stop reason: HOSPADM

## 2024-07-11 RX ORDER — ENOXAPARIN SODIUM 100 MG/ML
40 INJECTION SUBCUTANEOUS DAILY
Status: DISCONTINUED | OUTPATIENT
Start: 2024-07-12 | End: 2024-07-12 | Stop reason: HOSPADM

## 2024-07-11 RX ORDER — SODIUM CHLORIDE, SODIUM GLUCONATE, SODIUM ACETATE, POTASSIUM CHLORIDE, MAGNESIUM CHLORIDE, SODIUM PHOSPHATE, DIBASIC, AND POTASSIUM PHOSPHATE .53; .5; .37; .037; .03; .012; .00082 G/100ML; G/100ML; G/100ML; G/100ML; G/100ML; G/100ML; G/100ML
75 INJECTION, SOLUTION INTRAVENOUS CONTINUOUS
Status: DISCONTINUED | OUTPATIENT
Start: 2024-07-11 | End: 2024-07-12 | Stop reason: HOSPADM

## 2024-07-11 RX ORDER — FOLIC ACID 1 MG/1
1 TABLET ORAL DAILY
Status: DISCONTINUED | OUTPATIENT
Start: 2024-07-12 | End: 2024-07-12 | Stop reason: HOSPADM

## 2024-07-11 RX ORDER — PAROXETINE HYDROCHLORIDE HEMIHYDRATE 25 MG/1
25 TABLET, FILM COATED, EXTENDED RELEASE ORAL EVERY MORNING
Status: DISCONTINUED | OUTPATIENT
Start: 2024-07-12 | End: 2024-07-12 | Stop reason: HOSPADM

## 2024-07-11 RX ORDER — CHLORDIAZEPOXIDE HYDROCHLORIDE 5 MG/1
5 CAPSULE, GELATIN COATED ORAL EVERY 8 HOURS SCHEDULED
Status: DISCONTINUED | OUTPATIENT
Start: 2024-07-11 | End: 2024-07-12

## 2024-07-11 RX ORDER — DIAZEPAM 5 MG/ML
10 INJECTION, SOLUTION INTRAMUSCULAR; INTRAVENOUS ONCE
Status: COMPLETED | OUTPATIENT
Start: 2024-07-11 | End: 2024-07-11

## 2024-07-11 RX ORDER — LORAZEPAM 2 MG/ML
1 INJECTION INTRAMUSCULAR EVERY 4 HOURS PRN
Status: DISCONTINUED | OUTPATIENT
Start: 2024-07-11 | End: 2024-07-12 | Stop reason: HOSPADM

## 2024-07-11 RX ORDER — NICOTINE 21 MG/24HR
21 PATCH, TRANSDERMAL 24 HOURS TRANSDERMAL ONCE
Status: COMPLETED | OUTPATIENT
Start: 2024-07-11 | End: 2024-07-12

## 2024-07-11 RX ORDER — BUSPIRONE HYDROCHLORIDE 10 MG/1
10 TABLET ORAL 3 TIMES DAILY
Status: DISCONTINUED | OUTPATIENT
Start: 2024-07-11 | End: 2024-07-12 | Stop reason: HOSPADM

## 2024-07-11 RX ORDER — LANOLIN ALCOHOL/MO/W.PET/CERES
100 CREAM (GRAM) TOPICAL DAILY
Status: DISCONTINUED | OUTPATIENT
Start: 2024-07-12 | End: 2024-07-11 | Stop reason: SDUPTHER

## 2024-07-11 RX ADMIN — DIAZEPAM 10 MG: 10 INJECTION, SOLUTION INTRAMUSCULAR; INTRAVENOUS at 14:41

## 2024-07-11 RX ADMIN — SODIUM CHLORIDE, SODIUM GLUCONATE, SODIUM ACETATE, POTASSIUM CHLORIDE, MAGNESIUM CHLORIDE, SODIUM PHOSPHATE, DIBASIC, AND POTASSIUM PHOSPHATE 75 ML/HR: .53; .5; .37; .037; .03; .012; .00082 INJECTION, SOLUTION INTRAVENOUS at 17:39

## 2024-07-11 RX ADMIN — CHLORDIAZEPOXIDE HYDROCHLORIDE 5 MG: 5 CAPSULE ORAL at 21:38

## 2024-07-11 RX ADMIN — CHLORDIAZEPOXIDE HYDROCHLORIDE 5 MG: 5 CAPSULE ORAL at 17:40

## 2024-07-11 RX ADMIN — BUSPIRONE HYDROCHLORIDE 10 MG: 10 TABLET ORAL at 21:38

## 2024-07-11 RX ADMIN — BUSPIRONE HYDROCHLORIDE 10 MG: 10 TABLET ORAL at 17:40

## 2024-07-11 RX ADMIN — Medication 21 MG: at 15:02

## 2024-07-11 RX ADMIN — FOLIC ACID: 5 INJECTION, SOLUTION INTRAMUSCULAR; INTRAVENOUS; SUBCUTANEOUS at 14:47

## 2024-07-11 RX ADMIN — LORAZEPAM 1 MG: 2 INJECTION INTRAMUSCULAR; INTRAVENOUS at 17:39

## 2024-07-11 NOTE — ASSESSMENT & PLAN NOTE
Patient presents wanting alcohol detox    He drinks a handle of vodka per day  He states he has been doing that daily for the last 12 days  Last drink was this morning    Breathalyzer in the ER was 224    He wants to stop drinking alcohol.    No beds at detox    We are asked to admit to the medical floor at [facility]    Will put him on CIWA protocol, around-the-clock Librium and breakthrough Ativan for alcohol withdrawals    Start MVI, thiamine and folic acid    Will ask toxicology to assist

## 2024-07-11 NOTE — PLAN OF CARE

## 2024-07-11 NOTE — H&P
[facility]  H&P  Name: Eliud Clarke 33 y.o. male I MRN: [id number]  Unit/Bed#: ED-05 I Date of Admission: 7/11/2024   Date of Service: 7/11/2024  Hospital Day: 0      Assessment & Plan   Alcohol use disorder, severe, dependence (HCC)  Assessment & Plan  Patient presents wanting alcohol detox    He drinks a handle of vodka per day  He states he has been doing that daily for the last 12 days  Last drink was this morning    Breathalyzer in the ER was 224    He wants to stop drinking alcohol.    No beds at detox    We are asked to admit to the medical floor at [facility]    Will put him on CIWA protocol, around-the-clock Librium and breakthrough Ativan for alcohol withdrawals    Start MVI, thiamine and folic acid    Will ask toxicology to assist    Nicotine dependence due to vaping tobacco product  Assessment & Plan  He does vape    Will add nicotine patch.             Chief Complaint:   I want to quit drinking alcohol      History of Present Illness:    Eliud Clarke is a 33 y.o. male who presents with wanting to quit alcohol.  He has had a long history of alcohol abuse.  But he states it was really bad the last 12 days.  Drinking a handle of vodka per day.  Last drink was this morning.  In the ER his breathalyzer 224.  He is a little bit sleepy.  But he is protecting his airway.  They did not give anything sedating because he is not yet withdrawing.  States when he does withdrawal he gets a lot of tremor and anxiety.  They tried to get him into the detox unit but they do not have any available beds.  We are can admit him to the medical floor.  I will asked toxicology to assist.  I started him on CIWA protocol and around-the-clock benzodiazepines..      Review of Systems:    Review of Systems   Constitutional:  Negative for chills and fever.   HENT:  Negative for ear pain and sore throat.    Eyes:  Negative for pain and visual disturbance.   Respiratory:   Negative for cough and shortness of breath.    Cardiovascular:  Negative for chest pain and palpitations.   Gastrointestinal:  Negative for abdominal pain and vomiting.   Genitourinary:  Negative for dysuria and hematuria.   Musculoskeletal:  Negative for arthralgias and back pain.   Skin:  Negative for color change and rash.   Neurological:  Positive for tremors. Negative for seizures and syncope.   Psychiatric/Behavioral:  Positive for sleep disturbance.         Anxiety     All other systems reviewed and are negative.        Past Medical and Surgical History:     Past Medical History:   Diagnosis Date    Alcohol withdrawal seizure (HCC)     Anxiety     Depression     High triglycerides     Pancreatitis        History reviewed. No pertinent surgical history.      Home Medications:    Prior to Admission medications    Medication Sig Start Date End Date Taking? Authorizing Provider   busPIRone (BUSPAR) 30 MG tablet Take 10 mg by mouth 3 (three) times a day   Yes Historical Provider, MD   calcium carbonate (TUMS) 500 mg chewable tablet Chew 500 mg   Yes Historical Provider, MD   chlordiazePOXIDE (LIBRIUM) 25 mg capsule Take 2 capsules (50 mg total) by mouth 3 (three) times a day as needed for withdrawal 6/23/24  Yes FARZANEH Padilla   PARoxetine (PAXIL-CR) 25 mg 24 hr tablet Take 1 tablet (25 mg total) by mouth every morning 4/18/24  Yes FARZANEH Padilla   traZODone (DESYREL) 50 mg tablet Take 1 tablet (50 mg total) by mouth daily at bedtime 5/30/24  Yes FARZANEH Padilla   potassium chloride (Klor-Con M20) 20 mEq tablet Take 1 tablet (20 mEq total) by mouth 2 (two) times a day  Patient not taking: Reported on 7/11/2024 3/25/24   Moshe Krishnamurthy MD   thiamine 100 MG tablet Take 1 tablet (100 mg total) by mouth daily Do not start before December 10, 2023. 12/10/23 4/18/24  Godwin Valdez MD     I have reviewed home medications with patient personally.      Allergies:   Allergies    Allergen Reactions    Erythromycin Rash         Social History:    Substance Use History:   Social History     Substance and Sexual Activity   Alcohol Use Yes    Alcohol/week: 17.0 standard drinks of alcohol    Types: 17 Shots of liquor per week     Social History     Tobacco Use   Smoking Status Never    Passive exposure: Never   Smokeless Tobacco Never     Social History     Substance and Sexual Activity   Drug Use Never         Family History:    non-contributory      Physical Exam:     Vitals:   Blood Pressure: 106/66 (07/11/24 1600)  Pulse: 76 (07/11/24 1600)  Temperature: 97.7 °F (36.5 °C) (07/11/24 1408)  Temp Source: Oral (07/11/24 1408)  Respirations: 15 (07/11/24 1600)  Weight - Scale: 89.2 kg (196 lb 10.4 oz) (07/11/24 1408)  SpO2: 94 % (07/11/24 1600)    Physical Exam    .    Additional Data:     Lab Results: I have personally reviewed pertinent reports.      Results from last 7 days   Lab Units 07/11/24  1440   WBC Thousand/uL 2.84*   HEMOGLOBIN g/dL 15.4   HEMATOCRIT % 44.5   PLATELETS Thousands/uL 219   SEGS PCT % 27*   LYMPHO PCT % 62*   MONO PCT % 9   EOS PCT % 1     Results from last 7 days   Lab Units 07/11/24  1440   POTASSIUM mmol/L 5.0   CHLORIDE mmol/L 104   CO2 mmol/L 26   BUN mg/dL 9   CREATININE mg/dL 0.90   CALCIUM mg/dL 8.3*   ALK PHOS U/L 45   ALT U/L 35   AST U/L 51*                     Imaging: I have personally reviewed pertinent reports.      No orders to display             VTE Prophylaxis: Enoxaparin (Lovenox)        Anticipated Length of Stay:  Patient will be admitted on an Inpatient basis with an anticipated length of stay of greater than 2 midnights.   Justification for Hospital Stay: Patient needs treatment for alcoholism.  He is going to go into withdrawals in the next 24 to 72 hours and will need treatment with IV benzodiazepines.  Length of stay to be greater than 48 hours        ** Please Note: This note has been constructed using a voice recognition system. **

## 2024-07-11 NOTE — ED PROVIDER NOTES
"History  Chief Complaint   Patient presents with    Alcohol Intoxication     PT reports binge drinking for a long period of time and \"looking for detox\". Last drink 8am today. Vodka use for about 12 days straight. Hx of withdrawal seizure.     34 yo M h/o alcohol abuse presenting requesting detox.     States drinking for the past 2 weeks straight- about a handle of vodka/day. Last drink 8am. Reports that he developed etoh withdrawal sx, feels shaky currently. H/o withdrawal seizures in the past, last was about 2 years ago. Last did rehab about 2 years ago. Reports poor po intake. Denies drug use. Reports anxiety/depression, denies SI/HI/AH/VH              Per independent review of external records:   - 7/4 ER visit- ETOH intox  - 6/18 ER visit- ETOH intox requesting detox, wanted to be discharged, went home with recently prescribed Librium      Prior to Admission Medications   Prescriptions Last Dose Informant Patient Reported? Taking?   PARoxetine (PAXIL-CR) 25 mg 24 hr tablet 7/11/2024  No Yes   Sig: Take 1 tablet (25 mg total) by mouth every morning   busPIRone (BUSPAR) 30 MG tablet 7/11/2024  Yes Yes   Sig: Take 10 mg by mouth 3 (three) times a day   calcium carbonate (TUMS) 500 mg chewable tablet   Yes Yes   Sig: Chew 500 mg   chlordiazePOXIDE (LIBRIUM) 25 mg capsule   No Yes   Sig: Take 2 capsules (50 mg total) by mouth 3 (three) times a day as needed for withdrawal   potassium chloride (Klor-Con M20) 20 mEq tablet Not Taking  No No   Sig: Take 1 tablet (20 mEq total) by mouth 2 (two) times a day   Patient not taking: Reported on 7/11/2024   thiamine 100 MG tablet   No No   Sig: Take 1 tablet (100 mg total) by mouth daily Do not start before December 10, 2023.   traZODone (DESYREL) 50 mg tablet   No Yes   Sig: Take 1 tablet (50 mg total) by mouth daily at bedtime      Facility-Administered Medications: None       Past Medical History:   Diagnosis Date    Alcohol withdrawal seizure (HCC)     Anxiety     " Depression     High triglycerides     Pancreatitis        History reviewed. No pertinent surgical history.    Family History   Problem Relation Age of Onset    Heart disease Mother     Diabetes Mother      I have reviewed and agree with the history as documented.    E-Cigarette/Vaping    E-Cigarette Use Current Every Day User      E-Cigarette/Vaping Substances    Nicotine Yes     THC No     CBD No     Flavoring No     Other No     Unknown No      Social History     Tobacco Use    Smoking status: Never     Passive exposure: Never    Smokeless tobacco: Never   Vaping Use    Vaping status: Every Day    Substances: Nicotine   Substance Use Topics    Alcohol use: Yes     Alcohol/week: 17.0 standard drinks of alcohol     Types: 17 Shots of liquor per week    Drug use: Never       Review of Systems    Physical Exam  Physical Exam  Vitals and nursing note reviewed.   Constitutional:       General: He is not in acute distress.     Appearance: Normal appearance. He is well-developed.   HENT:      Head: Normocephalic and atraumatic.      Nose: Nose normal.   Eyes:      Extraocular Movements: Extraocular movements intact.      Conjunctiva/sclera: Conjunctivae normal.   Cardiovascular:      Rate and Rhythm: Regular rhythm. Tachycardia present.      Heart sounds: Normal heart sounds.   Pulmonary:      Effort: Pulmonary effort is normal. No respiratory distress.      Breath sounds: Normal breath sounds. No stridor. No wheezing or rales.   Chest:      Chest wall: No tenderness.   Abdominal:      General: There is no distension.      Palpations: Abdomen is soft.      Tenderness: There is no abdominal tenderness. There is no guarding or rebound.   Musculoskeletal:         General: No swelling, tenderness or deformity.      Cervical back: Normal range of motion and neck supple.   Skin:     General: Skin is warm and dry.      Findings: No rash.   Neurological:      General: No focal deficit present.      Mental Status: He is alert and  oriented to person, place, and time.      Motor: No abnormal muscle tone.      Coordination: Coordination normal.      Comments: Tremulous, oriented x3, strength equal throughout, normal coordination   Psychiatric:         Thought Content: Thought content normal.         Judgment: Judgment normal.         Vital Signs  ED Triage Vitals [07/11/24 1408]   Temperature Pulse Respirations Blood Pressure SpO2   97.7 °F (36.5 °C) 97 18 140/91 97 %      Temp Source Heart Rate Source Patient Position - Orthostatic VS BP Location FiO2 (%)   Oral Monitor Sitting Right arm --      Pain Score       4           Vitals:    07/11/24 1645 07/11/24 1647 07/11/24 1721 07/11/24 1722   BP: 109/75 109/75 128/83    Pulse: 74 83 87    Patient Position - Orthostatic VS:    Lying         Visual Acuity      ED Medications  Medications   nicotine (NICODERM CQ) 21 mg/24 hr TD 24 hr patch 21 mg (21 mg Transdermal Medication Applied 7/11/24 1502)   busPIRone (BUSPAR) tablet 10 mg (10 mg Oral Given 7/11/24 1740)   PARoxetine (PAXIL-CR) 24 hr tablet 25 mg (has no administration in time range)   enoxaparin (LOVENOX) subcutaneous injection 40 mg (has no administration in time range)   ondansetron (ZOFRAN) injection 4 mg (has no administration in time range)   multi-electrolyte (PLASMALYTE-A/ISOLYTE-S PH 7.4) IV solution (75 mL/hr Intravenous New Bag 7/11/24 1739)   thiamine tablet 100 mg (has no administration in time range)   folic acid (FOLVITE) tablet 1 mg (has no administration in time range)   multivitamin-minerals (CENTRUM) tablet 1 tablet (has no administration in time range)   chlordiazePOXIDE (LIBRIUM) capsule 5 mg (5 mg Oral Given 7/11/24 1740)   LORazepam (ATIVAN) injection 1 mg (1 mg Intravenous Given 7/11/24 1739)   thiamine (VITAMIN B1) 100 mg, folic acid 1 mg, magnesium sulfate 2 g in dextrose 5 % and sodium chloride 0.9 % (D5-NS) 1,000 mL infusion ( Intravenous Stopped 7/11/24 1629)   diazepam (VALIUM) injection 10 mg (10 mg  Intravenous Given 7/11/24 1441)       Diagnostic Studies  Results Reviewed       Procedure Component Value Units Date/Time    Platelet count [[id number]]     Lab Status: No result Specimen: Blood     Comprehensive metabolic panel [[id number]]  (Abnormal) Collected: 07/11/24 1440    Lab Status: Final result Specimen: Blood from Hand, Right Updated: 07/11/24 1502     Sodium 140 mmol/L      Potassium 5.0 mmol/L      Chloride 104 mmol/L      CO2 26 mmol/L      ANION GAP 10 mmol/L      BUN 9 mg/dL      Creatinine 0.90 mg/dL      Glucose 83 mg/dL      Calcium 8.3 mg/dL      AST 51 U/L      ALT 35 U/L      Alkaline Phosphatase 45 U/L      Total Protein 7.3 g/dL      Albumin 4.8 g/dL      Total Bilirubin 0.73 mg/dL      eGFR 113 ml/min/1.73sq m     Narrative:      National Kid[employer] Foundation guidelines for Chronic Kidney Disease (CKD):     Stage 1 with normal or high GFR (GFR > 90 mL/min/1.73 square meters)    Stage 2 Mild CKD (GFR = 60-89 mL/min/1.73 square meters)    Stage 3A Moderate CKD (GFR = 45-59 mL/min/1.73 square meters)    Stage 3B Moderate CKD (GFR = 30-44 mL/min/1.73 square meters)    Stage 4 Severe CKD (GFR = 15-29 mL/min/1.73 square meters)    Stage 5 End Stage CKD (GFR <15 mL/min/1.73 square meters)  Note: GFR calculation is accurate only with a steady state creatinine    Lipase [[id number]]  (Normal) Collected: 07/11/24 1440    Lab Status: Final result Specimen: Blood from Hand, Right Updated: 07/11/24 1502     Lipase 40 u/L     Ethanol [[id number]]  (Abnormal) Collected: 07/11/24 1440    Lab Status: Final result Specimen: Blood from Hand, Right Updated: 07/11/24 1501     Ethanol Lvl 312 mg/dL     CBC and differential [[id number]]  (Abnormal) Collected: 07/11/24 1440    Lab Status: Final result Specimen: Blood from Hand, Right Updated: 07/11/24 1446     WBC 2.84 Thousand/uL      RBC 4.95 Million/uL      Hemoglobin 15.4 g/dL      Hematocrit 44.5 %      MCV 90 fL      MCH 31.1 pg      MCHC 34.6 g/dL      RDW  13.2 %      MPV 8.8 fL      Platelets 219 Thousands/uL      nRBC 0 /100 WBCs      Segmented % 27 %      Immature Grans % 0 %      Lymphocytes % 62 %      Monocytes % 9 %      Eosinophils Relative 1 %      Basophils Relative 1 %      Absolute Neutrophils 0.77 Thousands/µL      Absolute Immature Grans 0.01 Thousand/uL      Absolute Lymphocytes 1.77 Thousands/µL      Absolute Monocytes 0.24 Thousand/µL      Eosinophils Absolute 0.02 Thousand/µL      Basophils Absolute 0.03 Thousands/µL                    No orders to display              Procedures  Procedures         ED Course  ED Course as of 07/11/24 1856   Thu Jul 11, 2024   1441 Discussed with detox unit, no available beds   1507 ETHANOL(!): 312   1509 EKG independently interpreted by myself:  NSR @ 81 bpm, RAD, qtc 476, no sig st changes            CIWA-Ar Score       Row Name 07/11/24 17:21:23 07/11/24 1647 07/11/24 1408       CIWA-Ar    Blood Pressure -- 109/75 --    Pulse -- 83 --    Nausea and Vomiting 0 2 2    Tactile Disturbances 0 4 2    Tremor 4 2 3    Auditory Disturbances 0 0 0    Paroxysmal Sweats 1 0 1    Visual Disturbances 0 4 0    Anxiety 3 3 2    Headache, Fullness in Head 0 0 0    Agitation 0 1 2    Orientation and Clouding of Sensorium 0 0 0    CIWA-Ar Total 8 16 12                                      SBIRT 20yo+      Flowsheet Row Most Recent Value   Initial Alcohol Screen: US AUDIT-C     1. How often do you have a drink containing alcohol? 6 Filed at: 07/11/2024 1457   2. How many drinks containing alcohol do you have on a typical day you are drinking?  3 Filed at: 07/11/2024 1457   3a. Male UNDER 65: How often do you have five or more drinks on one occasion? 0 Filed at: 07/11/2024 1457   3b. FEMALE Any Age, or MALE 65+: How often do you have 4 or more drinks on one occassion? 0 Filed at: 07/11/2024 1457   Audit-C Score 9 Filed at: 07/11/2024 1457   Full Alcohol Screen: US AUDIT    4. How often during the last year have you found that you  were not able to stop drinking once you had started? 2 Filed at: 07/11/2024 1457   5. How often during past year have you failed to do what was normally expected of you because of drinking?  2 Filed at: 07/11/2024 1457   6. How often in past year have you needed a first drink in the morning to get yourself going after a heavy drinking session?  4 Filed at: 07/11/2024 1457   7. How often in past year have you had feeling of guilt or remorse after drinking?  4 Filed at: 07/11/2024 1457   8. How often in past year have you been unable to remember what happened night before because you had been drinking?  0 Filed at: 07/11/2024 1457   9. Have you or someone else been injured as a result of your drinking?  4 Filed at: 07/11/2024 1457   10. Has a relative, friend, doctor or other health worker been concerned about your drinking and suggested you cut down?  4 Filed at: 07/11/2024 1457   AUDIT Total Score 29 Filed at: 07/11/2024 1457   ANNALISE: How many times in the past year have you...    Used an illegal drug or used a prescription medication for non-medical reasons? Never Filed at: 07/11/2024 1457                      Medical Decision Making  34 yo M with h/o alcohol abuse requesting help with detox- will treat sx, banana bag, labs to r/o metabolic derangement  Discussed with detox unit, not current beds, therefore admitted to [facility] for further management    Problems Addressed:  Alcohol intoxication (HCC): acute illness or injury    Amount and/or Complexity of Data Reviewed  External Data Reviewed: labs.  Labs: ordered. Decision-making details documented in ED Course.  ECG/medicine tests: ordered and independent interpretation performed. Decision-making details documented in ED Course.    Risk  OTC drugs.  Prescription drug management.  Decision regarding hospitalization.                 Disposition  Final diagnoses:   Alcohol intoxication (HCC)   Alcohol withdrawal (HCC)     Time reflects when diagnosis was documented in  both MDM as applicable and the Disposition within this note       Time User Action Codes Description Comment    7/11/2024  3:08 PM Danelle Redmond Add [F10.929] Alcohol intoxication (HCC)     7/11/2024  3:09 PM Danelle Redmond Add [F10.939] Alcohol withdrawal (HCC)           ED Disposition       ED Disposition   Admit    Condition   Stable    Date/Time   Thu Jul 11, 2024 3561    Comment   Case was discussed with [place] and the patient's admission status was agreed to be Admission Status: inpatient status to the service of Dr. Fung .               Follow-up Information    None         Current Discharge Medication List        CONTINUE these medications which have NOT CHANGED    Details   busPIRone (BUSPAR) 30 MG tablet Take 10 mg by mouth 3 (three) times a day      calcium carbonate (TUMS) 500 mg chewable tablet Chew 500 mg      chlordiazePOXIDE (LIBRIUM) 25 mg capsule Take 2 capsules (50 mg total) by mouth 3 (three) times a day as needed for withdrawal  Qty: 45 capsule, Refills: 0    Associated Diagnoses: Alcohol abuse      PARoxetine (PAXIL-CR) 25 mg 24 hr tablet Take 1 tablet (25 mg total) by mouth every morning  Qty: 90 tablet, Refills: 1    Associated Diagnoses: Anxiety      traZODone (DESYREL) 50 mg tablet Take 1 tablet (50 mg total) by mouth daily at bedtime  Qty: 90 tablet, Refills: 1    Associated Diagnoses: Anxiety      potassium chloride (Klor-Con M20) 20 mEq tablet Take 1 tablet (20 mEq total) by mouth 2 (two) times a day  Qty: 20 tablet, Refills: 0    Associated Diagnoses: Hypokalemia      thiamine 100 MG tablet Take 1 tablet (100 mg total) by mouth daily Do not start before December 10, 2023.  Qty: 30 tablet, Refills: 0    Associated Diagnoses: Alcoholic intoxication with complication (HCC)             No discharge procedures on file.    PDMP Review         Value Time User    PDMP Reviewed  Yes 6/23/2024  2:54 PM FARZANEH Padilla            ED Provider  Electronically Signed by             Danelle  Gricelda Redmond DO  07/11/24 1851

## 2024-07-11 NOTE — ASSESSMENT & PLAN NOTE
Patient presented wanting alcohol detox  He drinks a handle of vodka per day. He had been doing that daily for the last 12 days. Last drink was morning of 7/11  Breathalyzer in the ER was 224  Plan was to transition to detox unit however unfortunately no beds were available and therefore was admitted to the medical floor at [facility]  He was started on CIWA protocol, around-the-clock Librium and breakthrough Ativan for alcohol withdrawals and MVI, thiamine and folic acid  Today patient decided that he did not wish to be transferred to detox unit and wished to leave  Toxicology was consulted however consult was taken out patient no longer wish to transition to detox unit  Patient left AGAINST MEDICAL ADVICE as he was still having active withdrawal symptoms requiring additional IV doses of Ativan  Send patient with Librium taper and thiamine and folate supplements

## 2024-07-12 ENCOUNTER — HOSPITAL ENCOUNTER (INPATIENT)
Facility: HOSPITAL | Age: 34
LOS: 2 days | Discharge: HOME | DRG: 897 | End: 2024-07-14
Attending: EMERGENCY MEDICINE | Admitting: INTERNAL MEDICINE
Payer: COMMERCIAL

## 2024-07-12 VITALS
TEMPERATURE: 97.9 F | SYSTOLIC BLOOD PRESSURE: 133 MMHG | BODY MASS INDEX: 27.72 KG/M2 | HEIGHT: 71 IN | RESPIRATION RATE: 16 BRPM | HEART RATE: 91 BPM | DIASTOLIC BLOOD PRESSURE: 84 MMHG | OXYGEN SATURATION: 96 % | WEIGHT: 197.97 LBS

## 2024-07-12 DIAGNOSIS — Z78.9 ADMITTED TO SUBSTANCE MISUSE DETOXIFICATION CENTER: ICD-10-CM

## 2024-07-12 DIAGNOSIS — F10.939 ALCOHOL WITHDRAWAL (HCC): Primary | ICD-10-CM

## 2024-07-12 LAB
ALBUMIN SERPL BCG-MCNC: 4.6 G/DL (ref 3.5–5)
ALBUMIN SERPL BCG-MCNC: 4.7 G/DL (ref 3.5–5)
ALP SERPL-CCNC: 43 U/L (ref 34–104)
ALP SERPL-CCNC: 44 U/L (ref 34–104)
ALT SERPL W P-5'-P-CCNC: 31 U/L (ref 7–52)
ALT SERPL W P-5'-P-CCNC: 33 U/L (ref 7–52)
AMPHETAMINES SERPL QL SCN: NEGATIVE
ANION GAP SERPL CALCULATED.3IONS-SCNC: 10 MMOL/L (ref 4–13)
ANION GAP SERPL CALCULATED.3IONS-SCNC: 12 MMOL/L (ref 4–13)
AST SERPL W P-5'-P-CCNC: 35 U/L (ref 13–39)
AST SERPL W P-5'-P-CCNC: 38 U/L (ref 13–39)
BARBITURATES UR QL: NEGATIVE
BASOPHILS # BLD AUTO: 0.04 THOUSANDS/ÂΜL (ref 0–0.1)
BASOPHILS # BLD AUTO: 0.04 THOUSANDS/ÂΜL (ref 0–0.1)
BASOPHILS NFR BLD AUTO: 1 % (ref 0–1)
BASOPHILS NFR BLD AUTO: 1 % (ref 0–1)
BENZODIAZ UR QL: POSITIVE
BILIRUB DIRECT SERPL-MCNC: 0.3 MG/DL (ref 0–0.2)
BILIRUB SERPL-MCNC: 1.04 MG/DL (ref 0.2–1)
BILIRUB SERPL-MCNC: 1.5 MG/DL (ref 0.2–1)
BUN SERPL-MCNC: 9 MG/DL (ref 5–25)
BUN SERPL-MCNC: 9 MG/DL (ref 5–25)
CALCIUM SERPL-MCNC: 8.4 MG/DL (ref 8.4–10.2)
CALCIUM SERPL-MCNC: 8.8 MG/DL (ref 8.4–10.2)
CHLORIDE SERPL-SCNC: 101 MMOL/L (ref 96–108)
CHLORIDE SERPL-SCNC: 99 MMOL/L (ref 96–108)
CO2 SERPL-SCNC: 25 MMOL/L (ref 21–32)
CO2 SERPL-SCNC: 25 MMOL/L (ref 21–32)
COCAINE UR QL: NEGATIVE
CREAT SERPL-MCNC: 0.83 MG/DL (ref 0.6–1.3)
CREAT SERPL-MCNC: 0.84 MG/DL (ref 0.6–1.3)
EOSINOPHIL # BLD AUTO: 0.01 THOUSAND/ÂΜL (ref 0–0.61)
EOSINOPHIL # BLD AUTO: 0.03 THOUSAND/ÂΜL (ref 0–0.61)
EOSINOPHIL NFR BLD AUTO: 0 % (ref 0–6)
EOSINOPHIL NFR BLD AUTO: 1 % (ref 0–6)
ERYTHROCYTE [DISTWIDTH] IN BLOOD BY AUTOMATED COUNT: 13.2 % (ref 11.6–15.1)
ERYTHROCYTE [DISTWIDTH] IN BLOOD BY AUTOMATED COUNT: 13.2 % (ref 11.6–15.1)
ETHANOL SERPL-MCNC: <10 MG/DL
FENTANYL UR QL SCN: NEGATIVE
GFR SERPL CREATININE-BSD FRML MDRD: 116 ML/MIN/1.73SQ M
GFR SERPL CREATININE-BSD FRML MDRD: 117 ML/MIN/1.73SQ M
GLUCOSE SERPL-MCNC: 104 MG/DL (ref 65–140)
GLUCOSE SERPL-MCNC: 104 MG/DL (ref 65–140)
HCT VFR BLD AUTO: 39.5 % (ref 36.5–49.3)
HCT VFR BLD AUTO: 41.5 % (ref 36.5–49.3)
HGB BLD-MCNC: 13.7 G/DL (ref 12–17)
HGB BLD-MCNC: 14.4 G/DL (ref 12–17)
HYDROCODONE UR QL SCN: NEGATIVE
IMM GRANULOCYTES # BLD AUTO: 0.01 THOUSAND/UL (ref 0–0.2)
IMM GRANULOCYTES # BLD AUTO: 0.01 THOUSAND/UL (ref 0–0.2)
IMM GRANULOCYTES NFR BLD AUTO: 0 % (ref 0–2)
IMM GRANULOCYTES NFR BLD AUTO: 0 % (ref 0–2)
LYMPHOCYTES # BLD AUTO: 0.74 THOUSANDS/ÂΜL (ref 0.6–4.47)
LYMPHOCYTES # BLD AUTO: 1.09 THOUSANDS/ÂΜL (ref 0.6–4.47)
LYMPHOCYTES NFR BLD AUTO: 19 % (ref 14–44)
LYMPHOCYTES NFR BLD AUTO: 23 % (ref 14–44)
MAGNESIUM SERPL-MCNC: 2 MG/DL (ref 1.9–2.7)
MAGNESIUM SERPL-MCNC: 2.1 MG/DL (ref 1.9–2.7)
MCH RBC QN AUTO: 31.4 PG (ref 26.8–34.3)
MCH RBC QN AUTO: 32 PG (ref 26.8–34.3)
MCHC RBC AUTO-ENTMCNC: 34.7 G/DL (ref 31.4–37.4)
MCHC RBC AUTO-ENTMCNC: 34.7 G/DL (ref 31.4–37.4)
MCV RBC AUTO: 90 FL (ref 82–98)
MCV RBC AUTO: 92 FL (ref 82–98)
METHADONE UR QL: NEGATIVE
MONOCYTES # BLD AUTO: 0.24 THOUSAND/ÂΜL (ref 0.17–1.22)
MONOCYTES # BLD AUTO: 0.38 THOUSAND/ÂΜL (ref 0.17–1.22)
MONOCYTES NFR BLD AUTO: 6 % (ref 4–12)
MONOCYTES NFR BLD AUTO: 8 % (ref 4–12)
NEUTROPHILS # BLD AUTO: 2.96 THOUSANDS/ÂΜL (ref 1.85–7.62)
NEUTROPHILS # BLD AUTO: 3.2 THOUSANDS/ÂΜL (ref 1.85–7.62)
NEUTS SEG NFR BLD AUTO: 67 % (ref 43–75)
NEUTS SEG NFR BLD AUTO: 74 % (ref 43–75)
NRBC BLD AUTO-RTO: 0 /100 WBCS
NRBC BLD AUTO-RTO: 0 /100 WBCS
OPIATES UR QL SCN: NEGATIVE
OXYCODONE+OXYMORPHONE UR QL SCN: NEGATIVE
PCP UR QL: NEGATIVE
PLATELET # BLD AUTO: 187 THOUSANDS/UL (ref 149–390)
PLATELET # BLD AUTO: 214 THOUSANDS/UL (ref 149–390)
PMV BLD AUTO: 8.6 FL (ref 8.9–12.7)
PMV BLD AUTO: 8.7 FL (ref 8.9–12.7)
POTASSIUM SERPL-SCNC: 3.1 MMOL/L (ref 3.5–5.3)
POTASSIUM SERPL-SCNC: 3.6 MMOL/L (ref 3.5–5.3)
PROT SERPL-MCNC: 7 G/DL (ref 6.4–8.4)
PROT SERPL-MCNC: 7.4 G/DL (ref 6.4–8.4)
RBC # BLD AUTO: 4.28 MILLION/UL (ref 3.88–5.62)
RBC # BLD AUTO: 4.59 MILLION/UL (ref 3.88–5.62)
SODIUM SERPL-SCNC: 134 MMOL/L (ref 135–147)
SODIUM SERPL-SCNC: 138 MMOL/L (ref 135–147)
THC UR QL: NEGATIVE
WBC # BLD AUTO: 4 THOUSAND/UL (ref 4.31–10.16)
WBC # BLD AUTO: 4.75 THOUSAND/UL (ref 4.31–10.16)

## 2024-07-12 PROCEDURE — 80307 DRUG TEST PRSMV CHEM ANLYZR: CPT | Performed by: EMERGENCY MEDICINE

## 2024-07-12 PROCEDURE — 96361 HYDRATE IV INFUSION ADD-ON: CPT

## 2024-07-12 PROCEDURE — 99223 1ST HOSP IP/OBS HIGH 75: CPT | Performed by: INTERNAL MEDICINE

## 2024-07-12 PROCEDURE — 93005 ELECTROCARDIOGRAM TRACING: CPT

## 2024-07-12 PROCEDURE — 85025 COMPLETE CBC W/AUTO DIFF WBC: CPT | Performed by: EMERGENCY MEDICINE

## 2024-07-12 PROCEDURE — 99239 HOSP IP/OBS DSCHRG MGMT >30: CPT

## 2024-07-12 PROCEDURE — 80053 COMPREHEN METABOLIC PANEL: CPT | Performed by: HOSPITALIST

## 2024-07-12 PROCEDURE — 99284 EMERGENCY DEPT VISIT MOD MDM: CPT

## 2024-07-12 PROCEDURE — 99291 CRITICAL CARE FIRST HOUR: CPT | Performed by: EMERGENCY MEDICINE

## 2024-07-12 PROCEDURE — 83735 ASSAY OF MAGNESIUM: CPT | Performed by: HOSPITALIST

## 2024-07-12 PROCEDURE — 85025 COMPLETE CBC W/AUTO DIFF WBC: CPT | Performed by: HOSPITALIST

## 2024-07-12 PROCEDURE — 83735 ASSAY OF MAGNESIUM: CPT | Performed by: EMERGENCY MEDICINE

## 2024-07-12 PROCEDURE — 80048 BASIC METABOLIC PNL TOTAL CA: CPT | Performed by: EMERGENCY MEDICINE

## 2024-07-12 PROCEDURE — 80076 HEPATIC FUNCTION PANEL: CPT | Performed by: EMERGENCY MEDICINE

## 2024-07-12 PROCEDURE — 82077 ASSAY SPEC XCP UR&BREATH IA: CPT | Performed by: EMERGENCY MEDICINE

## 2024-07-12 PROCEDURE — 96374 THER/PROPH/DIAG INJ IV PUSH: CPT

## 2024-07-12 RX ORDER — CHLORDIAZEPOXIDE HYDROCHLORIDE 25 MG/1
CAPSULE, GELATIN COATED ORAL
Qty: 15 CAPSULE | Refills: 0 | Status: SHIPPED | OUTPATIENT
Start: 2024-07-12 | End: 2024-07-14

## 2024-07-12 RX ORDER — NICOTINE 21 MG/24HR
14 PATCH, TRANSDERMAL 24 HOURS TRANSDERMAL DAILY
Status: DISCONTINUED | OUTPATIENT
Start: 2024-07-13 | End: 2024-07-12

## 2024-07-12 RX ORDER — DIAZEPAM 5 MG/ML
10 INJECTION, SOLUTION INTRAMUSCULAR; INTRAVENOUS ONCE
Status: COMPLETED | OUTPATIENT
Start: 2024-07-12 | End: 2024-07-12

## 2024-07-12 RX ORDER — CHLORDIAZEPOXIDE HYDROCHLORIDE 25 MG/1
25 CAPSULE, GELATIN COATED ORAL EVERY 8 HOURS SCHEDULED
Status: DISCONTINUED | OUTPATIENT
Start: 2024-07-12 | End: 2024-07-12 | Stop reason: HOSPADM

## 2024-07-12 RX ORDER — LANOLIN ALCOHOL/MO/W.PET/CERES
100 CREAM (GRAM) TOPICAL DAILY
Qty: 30 TABLET | Refills: 0 | Status: SHIPPED | OUTPATIENT
Start: 2024-07-12 | End: 2024-08-11

## 2024-07-12 RX ORDER — FOLIC ACID 1 MG/1
1 TABLET ORAL DAILY
Qty: 30 TABLET | Refills: 0 | Status: SHIPPED | OUTPATIENT
Start: 2024-07-13 | End: 2024-08-12

## 2024-07-12 RX ORDER — NICOTINE 21 MG/24HR
14 PATCH, TRANSDERMAL 24 HOURS TRANSDERMAL DAILY
Status: DISCONTINUED | OUTPATIENT
Start: 2024-07-12 | End: 2024-07-14 | Stop reason: HOSPADM

## 2024-07-12 RX ORDER — LORAZEPAM 1 MG/1
2 TABLET ORAL ONCE
Status: COMPLETED | OUTPATIENT
Start: 2024-07-12 | End: 2024-07-12

## 2024-07-12 RX ORDER — LANOLIN ALCOHOL/MO/W.PET/CERES
6 CREAM (GRAM) TOPICAL
Status: DISCONTINUED | OUTPATIENT
Start: 2024-07-12 | End: 2024-07-12 | Stop reason: HOSPADM

## 2024-07-12 RX ORDER — HEPARIN SODIUM 5000 [USP'U]/ML
5000 INJECTION, SOLUTION INTRAVENOUS; SUBCUTANEOUS EVERY 8 HOURS SCHEDULED
Status: DISCONTINUED | OUTPATIENT
Start: 2024-07-12 | End: 2024-07-14 | Stop reason: HOSPADM

## 2024-07-12 RX ORDER — NICOTINE 21 MG/24HR
21 PATCH, TRANSDERMAL 24 HOURS TRANSDERMAL DAILY
Status: DISCONTINUED | OUTPATIENT
Start: 2024-07-12 | End: 2024-07-12 | Stop reason: HOSPADM

## 2024-07-12 RX ORDER — TRAZODONE HYDROCHLORIDE 50 MG/1
50 TABLET ORAL
Qty: 90 TABLET | Refills: 0 | Status: SHIPPED | OUTPATIENT
Start: 2024-07-12

## 2024-07-12 RX ORDER — TRAZODONE HYDROCHLORIDE 50 MG/1
50 TABLET ORAL
Status: DISCONTINUED | OUTPATIENT
Start: 2024-07-12 | End: 2024-07-14 | Stop reason: HOSPADM

## 2024-07-12 RX ORDER — BUSPIRONE HYDROCHLORIDE 10 MG/1
10 TABLET ORAL 3 TIMES DAILY
COMMUNITY
Start: 2024-07-01

## 2024-07-12 RX ORDER — BUSPIRONE HYDROCHLORIDE 10 MG/1
10 TABLET ORAL 3 TIMES DAILY
Status: DISCONTINUED | OUTPATIENT
Start: 2024-07-12 | End: 2024-07-14 | Stop reason: HOSPADM

## 2024-07-12 RX ORDER — LORAZEPAM 2 MG/ML
4 INJECTION INTRAMUSCULAR ONCE
Status: COMPLETED | OUTPATIENT
Start: 2024-07-12 | End: 2024-07-12

## 2024-07-12 RX ORDER — LANOLIN ALCOHOL/MO/W.PET/CERES
100 CREAM (GRAM) TOPICAL DAILY
Status: DISCONTINUED | OUTPATIENT
Start: 2024-07-13 | End: 2024-07-14 | Stop reason: HOSPADM

## 2024-07-12 RX ORDER — PAROXETINE HYDROCHLORIDE HEMIHYDRATE 25 MG/1
25 TABLET, FILM COATED, EXTENDED RELEASE ORAL EVERY MORNING
Status: DISCONTINUED | OUTPATIENT
Start: 2024-07-13 | End: 2024-07-14 | Stop reason: HOSPADM

## 2024-07-12 RX ORDER — CHLORDIAZEPOXIDE HYDROCHLORIDE 25 MG/1
25 CAPSULE, GELATIN COATED ORAL EVERY 6 HOURS SCHEDULED
Status: DISCONTINUED | OUTPATIENT
Start: 2024-07-12 | End: 2024-07-13

## 2024-07-12 RX ORDER — FOLIC ACID 1 MG/1
1 TABLET ORAL DAILY
Status: DISCONTINUED | OUTPATIENT
Start: 2024-07-13 | End: 2024-07-14 | Stop reason: HOSPADM

## 2024-07-12 RX ADMIN — DIAZEPAM 10 MG: 5 INJECTION, SOLUTION INTRAMUSCULAR; INTRAVENOUS at 18:18

## 2024-07-12 RX ADMIN — CHLORDIAZEPOXIDE HYDROCHLORIDE 25 MG: 25 CAPSULE ORAL at 19:43

## 2024-07-12 RX ADMIN — LORAZEPAM 1 MG: 2 INJECTION INTRAMUSCULAR; INTRAVENOUS at 11:02

## 2024-07-12 RX ADMIN — BUSPIRONE HYDROCHLORIDE 10 MG: 10 TABLET ORAL at 08:10

## 2024-07-12 RX ADMIN — MULTIPLE VITAMINS W/ MINERALS TAB 1 TABLET: TAB ORAL at 08:10

## 2024-07-12 RX ADMIN — PAROXETINE 25 MG: 25 TABLET, FILM COATED, EXTENDED RELEASE ORAL at 09:22

## 2024-07-12 RX ADMIN — NICOTINE POLACRILEX 2 MG: 2 GUM, CHEWING ORAL at 11:04

## 2024-07-12 RX ADMIN — POTASSIUM CHLORIDE: 2 INJECTION, SOLUTION, CONCENTRATE INTRAVENOUS at 19:44

## 2024-07-12 RX ADMIN — DIAZEPAM 10 MG: 5 INJECTION, SOLUTION INTRAMUSCULAR; INTRAVENOUS at 17:18

## 2024-07-12 RX ADMIN — TRAZODONE HYDROCHLORIDE 50 MG: 50 TABLET ORAL at 21:59

## 2024-07-12 RX ADMIN — SODIUM CHLORIDE 1000 ML: 0.9 INJECTION, SOLUTION INTRAVENOUS at 17:15

## 2024-07-12 RX ADMIN — LORAZEPAM 4 MG: 2 INJECTION INTRAMUSCULAR; INTRAVENOUS at 19:43

## 2024-07-12 RX ADMIN — LORAZEPAM 1 MG: 2 INJECTION INTRAMUSCULAR; INTRAVENOUS at 00:03

## 2024-07-12 RX ADMIN — THIAMINE HCL TAB 100 MG 100 MG: 100 TAB at 08:10

## 2024-07-12 RX ADMIN — NICOTINE POLACRILEX 2 MG: 2 GUM, CHEWING ORAL at 08:10

## 2024-07-12 RX ADMIN — LORAZEPAM 2 MG: 1 TABLET ORAL at 02:46

## 2024-07-12 RX ADMIN — CHLORDIAZEPOXIDE HYDROCHLORIDE 5 MG: 5 CAPSULE ORAL at 05:05

## 2024-07-12 RX ADMIN — CHLORDIAZEPOXIDE HYDROCHLORIDE 25 MG: 25 CAPSULE ORAL at 13:52

## 2024-07-12 RX ADMIN — FOLIC ACID 1 MG: 1 TABLET ORAL at 08:10

## 2024-07-12 RX ADMIN — NICOTINE 14 MG: 14 PATCH, EXTENDED RELEASE TRANSDERMAL at 20:38

## 2024-07-12 RX ADMIN — BUSPIRONE HYDROCHLORIDE 10 MG: 10 TABLET ORAL at 20:38

## 2024-07-12 NOTE — H&P
[facility]  H&P  Name: Eliud Clarke 33 y.o. male I MRN: [id number]  Unit/Bed#: ED 03 I Date of Admission: 7/12/2024   Date of Service: 7/12/2024 I Hospital Day: 0      Assessment & Plan   * Admitted to substance misuse detoxification center  Assessment & Plan  History of anxiety/depression and alcohol use presents for detox evaluation  Patient was admitted yesterday at [facility] for similar but left AMA.  Patient reports consuming one third handle of vodka daily last drink prior to yesterday's admission.  Due to lack of staffing on detox unit patient being admitted to medical for now.  [facility] protocol and will schedule chlordiazepoxide.    Nicotine dependence due to vaping tobacco product  Assessment & Plan  Patient vapes.  Will order nicotine patch    Hypokalemia  Assessment & Plan  Hyponatremia: Will order D5 NS + KCl  Hypokalemia: Replace and IV fluids and recheck tomorrow    Results from last 7 days   Lab Units 07/12/24  1714 07/12/24  0507 07/11/24  1440   SODIUM mmol/L 134* 138 140   POTASSIUM mmol/L 3.1* 3.6 5.0       Anxiety  Assessment & Plan  Continue paroxetine 25 mg and trazodone 50 mg at bedtime    VTE Pharmacologic Prophylaxis:   Moderate Risk (Score 3-4) - Pharmacological DVT Prophylaxis Ordered: heparin.  Code Status: Level 1 - Full Code  Discussion with family:     Anticipated Length of Stay: Patient will be admitted on an inpatient basis with an anticipated length of stay of greater than 2 midnights secondary to need for detox.    Total Time Spent on Date of Encounter in care of patient: This time was spent on one or more of the following: performing physical exam; counseling and coordination of care; obtaining or reviewing history; documenting in the medical record; reviewing/ordering tests, medications or procedures; communicating with other healthcare professionals and discussing with patient's family/caregivers.    Chief Complaint:      Detox Evaluation (Pt rpts 1/3 handle of vodka daily for 10-12days. Last drink 7/11 0830. Pt requesting detox, HX withdrawal seizures. )    History of Present Illness:    Eliud Clarke is a 33 y.o. male with a past medical history of anxiety/depression and alcohol use who presents with need for detox.  The patient consumes one third handle of vodka daily.  He last drank yesterday and went to [facility] but due to lack of detox staffing he was admitted there.  Unfortunately he left AMA this morning but cannot handle his withdrawal symptoms so he came here.  Unfortunately there is still lack of detox staffing so he is being admitted to medical service until detox is available to see him.  He complains of withdrawal symptoms and does have a history of alcohol withdrawal seizure.  He denies any fevers or chills but does feel like bugs are crawling on his skin.    Review of Systems:  Review of Systems   Constitutional:  Negative for fever.   HENT:  Negative for facial swelling.    Eyes:  Negative for visual disturbance.   Respiratory:  Negative for shortness of breath.    Cardiovascular:  Negative for chest pain and palpitations.   Gastrointestinal:  Negative for abdominal distention and abdominal pain.   Genitourinary:  Negative for hematuria and urgency.   Musculoskeletal:  Negative for myalgias.   Skin:  Negative for rash.   Neurological:  Negative for speech difficulty.   Psychiatric/Behavioral:  The patient is nervous/anxious.    All other systems reviewed and are negative.        Past Medical and Surgical History:   Past Medical History:   Diagnosis Date    Alcohol withdrawal seizure (HCC)     Anxiety     Depression     High triglycerides     Pancreatitis      History reviewed. No pertinent surgical history.  Meds/Allergies:  Allergies:   Allergies   Allergen Reactions    Erythromycin Rash     Prior to Admission Medications   Prescriptions Last Dose Informant Patient Reported? Taking?   PARoxetine  (PAXIL-CR) 25 mg 24 hr tablet   No No   Sig: Take 1 tablet (25 mg total) by mouth every morning   busPIRone (BUSPAR) 10 mg tablet   Yes Yes   Sig: Take 10 mg by mouth 3 (three) times a day   chlordiazePOXIDE (LIBRIUM) 25 mg capsule   No No   Sig: Take 1 capsule (25 mg total) by mouth every 8 (eight) hours for 3 days, THEN 1 capsule (25 mg total) 2 (two) times a day for 3 days.   folic acid (FOLVITE) 1 mg tablet   No No   Sig: Take 1 tablet (1 mg total) by mouth daily   thiamine 100 MG tablet   No No   Sig: Take 1 tablet (100 mg total) by mouth daily   traZODone (DESYREL) 50 mg tablet   No No   Sig: Take 1 tablet (50 mg total) by mouth daily at bedtime      Facility-Administered Medications: None     Social History:     Social History     Socioeconomic History    Marital status: Single     Spouse name: Not on file    Number of children: Not on file    Years of education: Not on file    Highest education level: Not on file   Occupational History    Not on file   Tobacco Use    Smoking status: Never     Passive exposure: Never    Smokeless tobacco: Never   Vaping Use    Vaping status: Every Day    Substances: Nicotine   Substance and Sexual Activity    Alcohol use: Yes     Alcohol/week: 17.0 standard drinks of alcohol     Types: 17 Shots of liquor per week    Drug use: Never    Sexual activity: Not Currently   Other Topics Concern    Not on file   Social History Narrative    Not on file     Social Determinants of Health     Financial Resource Strain: Low Risk  (10/28/2023)    [facility]    Overall Financial Resource Strain (CARDIA)     Difficulty of Paying Living Expenses: Not hard at all   Food Insecurity: No Food Insecurity (7/12/2024)    Hunger Vital Sign     Worried About Running Out of Food in the Last Year: Never true     Ran Out of Food in the Last Year: Never true   Transportation Needs: No Transportation Needs (7/12/2024)    PRAPARE - Transportation      Lack of Transportation (Medical): No     Lack of Transportation (Non-Medical): No   Physical Activity: Not on file   Stress: Not on file   Social Connections: Unknown (6/18/2024)    Received from [facility]     How often do you feel lonely or isolated from those around you? (Adult - for ages 18 years and over): Not on file   Intimate Partner Violence: Not At Risk (10/28/2023)    [facility]    Humiliation, Afraid, Rape, and Kick questionnaire     Fear of Current or Ex-Partner: No     Emotionally Abused: No     Physically Abused: No     Sexually Abused: No   Housing Stability: Low Risk  (7/12/2024)    Housing Stability Vital Sign     Unable to Pay for Housing in the Last Year: No     Number of Times Moved in the Last Year: 1     Homeless in the Last Year: No     Patient Pre-hospital Living Situation:   Patient Pre-hospital Level of Mobility:   Patient Pre-hospital Diet Restrictions:     Family History:  Family History   Problem Relation Age of Onset    Heart disease Mother     Diabetes Mother      Physical Exam:   Vitals:   Blood Pressure: 125/56 (07/12/24 1836)  Pulse: 81 (07/12/24 1836)  Temperature: 99 °F (37.2 °C) (07/12/24 1649)  Temp Source: Tympanic (07/12/24 1649)  Respirations: 20 (07/12/24 1836)  Weight - Scale: 92 kg (202 lb 12.8 oz) (07/12/24 1649)  SpO2: 98 % (07/12/24 1836)    Physical Exam  Vitals reviewed.   Constitutional:       General: He is not in acute distress.  HENT:      Head: Atraumatic.   Eyes:      Extraocular Movements: Extraocular movements intact.      Conjunctiva/sclera: Conjunctivae normal.   Cardiovascular:      Rate and Rhythm: Regular rhythm.      Heart sounds: Normal heart sounds.   Pulmonary:      Effort: Pulmonary effort is normal.      Breath sounds: No wheezing.   Abdominal:      General: Bowel sounds are normal.      Palpations: Abdomen is soft.      Tenderness: There is no abdominal  tenderness.   Musculoskeletal:         General: No swelling or tenderness.   Skin:     General: Skin is warm and dry.   Neurological:      General: No focal deficit present.      Mental Status: He is alert.      Motor: No weakness.   Psychiatric:         Mood and Affect: Mood is anxious.       Lab Results: I have personally reviewed pertinent reports.    Results from last 7 days   Lab Units 07/12/24  1714   WBC Thousand/uL 4.75   HEMOGLOBIN g/dL 13.7   HEMATOCRIT % 39.5   PLATELETS Thousands/uL 187   SEGS PCT % 67   LYMPHO PCT % 23   MONO PCT % 8   EOS PCT % 1     Results from last 7 days   Lab Units 07/12/24  1714 07/12/24  0507 07/11/24  1440   SODIUM mmol/L 134* 138 140   POTASSIUM mmol/L 3.1* 3.6 5.0   CHLORIDE mmol/L 99 101 104   CO2 mmol/L 25 25 26   ANION GAP mmol/L 10 12 10   BUN mg/dL 9 9 9   CREATININE mg/dL 0.83 0.84 0.90   CALCIUM mg/dL 8.8 8.4 8.3*   ALBUMIN g/dL 4.6 4.7 4.8   TOTAL BILIRUBIN mg/dL 1.50* 1.04* 0.73   ALK PHOS U/L 43 44 45   ALT U/L 31 33 35   AST U/L 35 38 51*   EGFR ml/min/1.73sq m 117 116 113   GLUCOSE RANDOM mg/dL 104 104 83         Lines/Drains  Invasive Devices       Peripheral Intravenous Line  Duration             Peripheral IV 07/12/24 Left;Ventral (anterior) Hand <1 day                    Imaging:   No results found.    EKG, Pathology, and Other Studies Reviewed on Admission:       ** Please Note: This note has been constructed using a voice recognition system. **

## 2024-07-12 NOTE — ASSESSMENT & PLAN NOTE
Hyponatremia: Will order D5 NS + KCl  Hypokalemia: Replace and IV fluids and recheck tomorrow    Results from last 7 days   Lab Units 07/12/24  1714 07/12/24  0507 07/11/24  1440   SODIUM mmol/L 134* 138 140   POTASSIUM mmol/L 3.1* 3.6 5.0

## 2024-07-12 NOTE — PLAN OF CARE

## 2024-07-12 NOTE — DISCHARGE SUMMARY
[facility]  Discharge- Eliud Clarke 1990, 33 y.o. male MRN: [id number]  Unit/Bed#: [place] [address] -01 Encounter: [id number]  Primary Care Provider: FARZANEH Padilla   Date and time admitted to hospital: 7/11/2024  2:11 PM    * Alcohol use disorder, severe, dependence (HCC)  Assessment & Plan  Patient presented wanting alcohol detox  He drinks a handle of vodka per day. He had been doing that daily for the last 12 days. Last drink was morning of 7/11  Breathalyzer in the ER was 224  Plan was to transition to detox unit however unfortunately no beds were available and therefore was admitted to the medical floor at [facility]  He was started on CIWA protocol, around-the-clock Librium and breakthrough Ativan for alcohol withdrawals and MVI, thiamine and folic acid  Today patient decided that he did not wish to be transferred to detox unit and wished to leave  Toxicology was consulted however consult was taken out patient no longer wish to transition to detox unit  Patient left AGAINST MEDICAL ADVICE as he was still having active withdrawal symptoms requiring additional IV doses of Ativan  Send patient with Librium taper and thiamine and folate supplements    Nicotine dependence due to vaping tobacco product  Assessment & Plan  Patient vapes  Utilized nicotine patches and as needed Nicorette while inpatient      Medical Problems       Resolved Problems  Date Reviewed: 7/12/2024   None       Discharging Physician / Practitioner: Susan Muñoz PA-C  PCP: FARZANEH Padilla  Admission Date:   Admission Orders (From admission, onward)       Ordered        07/11/24 1512  INPATIENT ADMISSION  Once                          Discharge Date: 07/12/24    Consultations During Hospital Stay:  None    Procedures Performed:   None    Significant Findings / Test Results:   None    Incidental Findings:   None    Test Results Pending at Discharge (will  require follow up):   None     Outpatient Tests Requested  Recommend repeat BMP in 1 week  Complications: None    Reason for Admission: Alcohol use disorder with severe dependence    Hospital Course:   Eliud Clarke is a 33 y.o. male patient who originally presented to the hospital on 7/11/2024  wanting alcohol detox.  He drinks a handle of vodka per day and had been drinking that much for the past 12 days.  Patient's last drink was the morning of 7/11/2024.  Patient's breathalyzer in the ER was 224.  Unfortunately there was no beds available in the detox unit so patient was admitted to the medical floor at [facility].  He was started on CIWA protocol, scheduled Librium and Ativan for breakthrough alcohol withdrawal symptoms.  He was started on IV thiamine and folate acid.  Toxicology was consulted to assist.  Unfortunately patient requested to leave the hospital the following day.  He no longer wished to be transferred to the detox unit.  He was requiring as needed doses of Ativan to control his alcohol withdrawal symptoms.  He left AGAINST MEDICAL ADVICE.  Patient has had a history of alcoholic withdrawal seizures.  Advised patient return to the hospital with worsening symptoms.  He was sent with Librium taper which he will continue on at discharge, refills of patient's thiamine and folate supplements were sent with him as well.  Patient is high risk for readmission.      Please see above list of diagnoses and related plan for additional information.     Condition at Discharge: fair    Discharge Day Visit / Exam:   Subjective: Seen sitting in chair at bedside today.  He reported feeling extremely anxious, agitated, and had visible tremor.  He declined having nausea/vomiting, headache, auditory disturbances visual disturbances.  He requested to leave the hospital he stated that he could not stay here he no longer wished to go to the detox unit  Vitals: Blood Pressure: 133/84 (07/12/24  "1450)  Pulse: 91 (07/12/24 1450)  Temperature: 97.9 °F (36.6 °C) (07/12/24 1450)  Temp Source: Oral (07/12/24 1056)  Respirations: 16 (07/12/24 1450)  Height: 5' 11\" (180.3 cm) (07/11/24 1714)  Weight - Scale: 89.8 kg (197 lb 15.6 oz) (07/11/24 1714)  SpO2: 96 % (07/12/24 1450)  Exam:   Physical Exam  Vitals and nursing note reviewed.   Constitutional:       Appearance: Normal appearance.   HENT:      Head: Normocephalic and atraumatic.   Eyes:      General: No scleral icterus.  Cardiovascular:      Rate and Rhythm: Normal rate and regular rhythm.   Pulmonary:      Effort: Pulmonary effort is normal.      Breath sounds: Normal breath sounds.   Abdominal:      General: Abdomen is flat. Bowel sounds are normal.      Palpations: Abdomen is soft.   Neurological:      Mental Status: He is alert.      Comments: Tremor   Psychiatric:      Comments: Anxious        Discussion with Family: Updated  (father) via phone.    Discharge instructions/Information to patient and family:   See after visit summary for information provided to patient and family.      Provisions for Follow-Up Care:  See after visit summary for information related to follow-up care and any pertinent home health orders.      Mobility at time of Discharge:   Basic Mobility Inpatient Raw Score: 17  JH-HLM Goal: 5: Stand one or more mins  JH-HLM Achieved: 8: Walk 250 feet ot more  HLM Goal achieved. Continue to encourage appropriate mobility.     Disposition:   Home    Planned Readmission: none     Discharge Statement:  I spent 60 minutes discharging the patient. This time was spent on the day of discharge. I had direct contact with the patient on the day of discharge. Greater than 50% of the total time was spent examining patient, answering all patient questions, arranging and discussing plan of care with patient as well as directly providing post-discharge instructions.  Additional time then spent on discharge activities.    Discharge " Medications:  See after visit summary for reconciled discharge medications provided to patient and/or family.      **Please Note: This note may have been constructed using a voice recognition system**

## 2024-07-12 NOTE — LETTER
[address] INPATIENT DETOX  [address] [facility] [address]  [phone]  Dept: [phone]    July 14, 2024     Patient: Eliud Clarke   YOB: 1990   Date of Visit: 7/12/2024       To Whom it May Concern:    Eliud Clarke is under my professional care. He was seen in the hospital from 7/11/2024 to 07/14/24. He may return to work on 07/15 without limitations.    If you have any questions or concerns, please don't hesitate to call.         Sincerely,          Jenny Payan MD

## 2024-07-12 NOTE — PLAN OF CARE

## 2024-07-12 NOTE — NURSING NOTE
Patient requesting to sign out AMA. SLIM provider notified and paperwork completed.   Discussed medication instructions with patient. Patient verbalized understanding of instruction. IV removed.

## 2024-07-12 NOTE — UTILIZATION REVIEW
NOTIFICATION OF INPATIENT ADMISSION   AUTHORIZATION REQUEST   SERVICING FACILITY:   [place] [address]  Tax ID: [id number]  NPI: [id number] ATTENDING PROVIDER:  Attending Name and NPI#: Gary Huggins Md [[id number]]  Address: [address] [place] [address]  Phone: [phone]     ADMISSION INFORMATION:  Place of Service: Inpatient [facility]  Place of Service Code: 21  Inpatient Admission Date/Time: 7/11/24  3:12 PM  Discharge Date/Time: No discharge date for patient encounter.  Admitting Diagnosis Code/Description:  Alcohol withdrawal (HCC) [F10.939]  Alcohol intoxication (HCC) [F10.929]     UTILIZATION REVIEW CONTACT:  Soraida Flores Utilization   Network Utilization Review Department  Phone: [phone]  Fax [phone]  Email: [email or web address]  Contact for approvals/pending authorizations, clinical reviews, and discharge.     PHYSICIAN ADVISORY SERVICES:  Medical Necessity Denial & Xzrg-em-Eqtr Review  Phone: [phone]  Fax: [phone]  Email: [email or web address]     DISCHARGE SUPPORT TEAM:  For Patients Discharge Needs & Updates  Phone: [phone] opt. 2 Fax: [phone]  Email: [email or web address]

## 2024-07-12 NOTE — CASE MANAGEMENT
Case Management Discharge Planning Note    Patient name Eliud Clarke  Location South 2 /South 2 M* MRN [id number]  : 1990 Date 2024       Current Admission Date: 2024  Current Admission Diagnosis:Alcohol use disorder, severe, dependence (HCC)   Patient Active Problem List    Diagnosis Date Noted Date Diagnosed    Psychophysiological insomnia 2024     Nicotine dependence due to vaping tobacco product 2023     Alcohol use disorder, severe, dependence (HCC) 10/16/2021     Alcohol withdrawal syndrome without complication (HCC) 10/16/2021     COVID-19 10/16/2021     Anxiety 10/16/2021       LOS (days): 1  Geometric Mean LOS (GMLOS) (days):   Days to GMLOS:     OBJECTIVE:  Risk of Unplanned Readmission Score: 16.36         Current admission status: Inpatient   Preferred Pharmacy:   [facility]/pharmacy #[id number] [place] [address]  Phone: [phone] Fax: [phone]    Primary Care Provider: FARZANEH Padilla    Primary Insurance: [employer]  Secondary Insurance:     DISCHARGE DETAILS:    Discharge planning discussed with:: Patient        CM contacted family/caregiver?: No- see comments (Patient alert)  Were Treatment Team discharge recommendations reviewed with patient/caregiver?: Yes  Did patient/caregiver verbalize understanding of patient care needs?: Yes  Were patient/caregiver advised of the risks associated with not following Treatment Team discharge recommendations?: Yes    Contacts  Patient Contacts: Gustavo Clarke (Father)  [phone] (Mobile)  Relationship to Patient:: Family  Contact Method: Phone  Phone Number: [phone] (Mobile)  Reason/Outcome: Emergency Contact    Requested Home Health Care         Is the patient interested in HHC at discharge?: No    DME Referral Provided  Referral made for DME?: No    Treatment Team Recommendation: Home  Discharge Destination Plan:: Home  Transport at  Discharge : Other (Comment) (Friend)       Additional Comments: CM met with patient at bedside to complete assessment; patient alert. Patient denies having a POA or LW. Patient currently resides with his parents in [place] and has an apartment with his brother in [place]. Patient lives on the second floor. Patient indicated he receives MH treatment for depression and anxiety, patient recieves medication from his PCP. Patient drives himself and completes ADLs. Patient denies use of DME at this time. CM inquired patient has been to alcohol detox one time prior and alcohol rehab treatment a few times. Patient denies HOST referral at this time. CM encouraged patient to continue to seek treatment and to seek further treatment with  [facility] as needed, patient agreeable. CM provided card with contact information should patient have any further discharge needs at this time.       Angela Posey,

## 2024-07-12 NOTE — ASSESSMENT & PLAN NOTE
History of anxiety/depression and alcohol use presents for detox evaluation  Patient was admitted yesterday at [facility] for similar but left AMA.  Patient reports consuming one third handle of vodka daily last drink prior to yesterday's admission.  Due to lack of staffing on detox unit patient being admitted to medical for now.  DENISEWA protocol and will schedule chlordiazepoxide.

## 2024-07-12 NOTE — UTILIZATION REVIEW
"Initial Clinical Review    Admission: Date/Time/Statement:   Admission Orders (From admission, onward)       Ordered        07/11/24 1512  INPATIENT ADMISSION  Once                          Orders Placed This Encounter   Procedures    INPATIENT ADMISSION     Standing Status:   Standing     Number of Occurrences:   1     Order Specific Question:   Level of Care     Answer:   Med Surg [16]     Order Specific Question:   Estimated length of stay     Answer:   More than 2 Midnights     Order Specific Question:   Certification     Answer:   I certify that inpatient services are medically necessary for this patient for a duration of greater than two midnights. See H&P and MD Progress Notes for additional information about the patient's course of treatment.     ED Arrival Information     Expected  -  Arrival  7/11/2024 14:04  Acuity  Emergent       Means of arrival  Walk-In  Escorted by  Family Member  Service  Hospitalist  Admission type  Emergency       Arrival complaint  alcohol withdraw      Chief Complaint   Patient presents with    Alcohol Intoxication     PT reports binge drinking for a long period of time and \"looking for detox\". Last drink 8am today. Vodka use for about 12 days straight. Hx of withdrawal seizure.       Initial Presentation: 33 y.o. male presents to the ED from home with alcohol intoxication and withdrawal and seeking detox. There are currently on beds in the detox unit.  Has been drinking handle of Vodka daily, last drink 0800 today 7/11.  Is having withdrawal symptoms - shaky, poor oral intake.  PMH: withdrawal seizures in past, past rehab 2 yr ago, anxiety, depression, tobacco use  In the ED Labs - ETOH 312, leukopenia.  ECG - NSR. Treated with IV fluids, IV Valium, IV Thiamine, folic acid, Mag, Nicoderm CQ.  On exam tachycardic, tremulous, oriented x 3, normal coordination and strength, sleep disturbance.  Initial CIWA 12.  Admitted to INPATIENT status with severe AUD, alcohol intoxication - " PLAN: CIWA, RTC Librium, PRN Ativan, MRI, thiamine, folic acid, Medical Toxicology Consult, Nicotine patch.     Anticipated Length of Stay/Certification Statement:  Patient will be admitted on an Inpatient basis with an anticipated length of stay of greater than 2 midnights.   Justification for Hospital Stay: Patient needs treatment for alcoholism.  He is going to go into withdrawals in the next 24 to 72 hours and will need treatment with IV benzodiazepines.  Length of stay to be greater than 48 hours     Date: 7/12   Day 2:   CIWA today 9-5-4.  VS stable.  On IV fluids. Using PRN IV Ativan, elevated T bili.  PT LEFT AMA TODAY.       ED Triage Vitals [07/11/24 1408]   Temperature Pulse Respirations Blood Pressure SpO2 Pain Score   97.7 °F (36.5 °C) 97 18 140/91 97 % 4     Weight (last 2 days)       Date/Time Weight    07/11/24 1714 89.8 (197.97)    07/11/24 1408 89.2 (196.65)            Vital Signs (last 3 days)       Date/Time Temp Pulse Resp BP MAP (mmHg) SpO2 O2 Device Patient Position - Orthostatic VS CIWA-Ar Total Pain    07/12/24 14:50:43 97.9 °F (36.6 °C) 91 16 133/84 100 96 % -- -- -- --    07/12/24 10:56:32 98.3 °F (36.8 °C) -- 16 135/92 106 -- None (Room air) Lying 5 --    07/12/24 07:39:24 98.1 °F (36.7 °C) 108 16 133/86 102 95 % None (Room air) Lying 4 No Pain    07/12/24 0344 -- -- -- -- -- -- -- -- 5 --    07/12/24 03:15:46 98 °F (36.7 °C) 93 18 136/88 104 97 % -- -- -- --    07/12/24 0240 -- -- -- -- -- -- -- -- 9 --    07/11/24 8730 -- -- -- -- -- -- -- -- 7 --    07/11/24 23:08:22 98.3 °F (36.8 °C) 78 18 112/69 83 97 % None (Room air) Lying -- --    07/11/24 2135 -- -- -- -- -- -- None (Room air) -- -- No Pain    07/11/24 2100 -- -- -- -- -- -- -- -- 3 --    07/11/24 19:21:54 97.4 °F (36.3 °C) 87 18 114/72 86 94 % -- -- -- --    07/11/24 19:20:30 97.4 °F (36.3 °C) 86 18 99/58 72 95 % -- -- -- --    07/11/24 1722 -- -- -- -- -- -- -- Lying -- No Pain    07/11/24 17:21:23 96.9 °F (36.1 °C) 87 --  128/83 98 96 % -- -- 8 --    07/11/24 1647 -- 83 -- 109/75 -- -- -- -- 16 --    07/11/24 1645 -- 74 15 109/75 -- 96 % -- -- -- --    07/11/24 1622 -- -- -- -- -- 96 % None (Room air) -- -- --    07/11/24 1600 -- 76 15 106/66 82 94 % None (Room air) Sitting -- --    07/11/24 1530 -- 74 -- 111/79 91 94 % -- -- -- --    07/11/24 1500 -- 79 15 112/88 93 96 % -- -- -- --    07/11/24 1408 97.7 °F (36.5 °C) 97 18 140/91 -- 97 % None (Room air) Sitting 12 4           CIWA-Ar Score       [employer] Name 07/12/24 10:56:32 07/12/24 07:39:24 07/12/24 0344       CIWA-Ar    Nausea and Vomiting 0 0 0    Tactile Disturbances 0 0 0    Tremor 1 2 1    Auditory Disturbances 0 0 0    Paroxysmal Sweats 0 0 1    Visual Disturbances 0 0 0    Anxiety 4 2 2    Headache, Fullness in Head 0 0 1    Agitation 0 0 0    Orientation and Clouding of Sensorium 0 0 0    CIWA-Ar Total 5 4 5      [employer] Name 07/12/24 0240 07/11/24 2355 07/11/24 2100       CIWA-Ar    Nausea and Vomiting 0 0 0    Tactile Disturbances 0 0 0    Tremor 1 1 0    Auditory Disturbances 0 0 0    Paroxysmal Sweats 1 1 1    Visual Disturbances 0 0 0    Anxiety 4 3 0    Headache, Fullness in Head 3 2 2    Agitation 0 0 0    Orientation and Clouding of Sensorium 0 0 0    CIWA-Ar Total 9 7 3      Row Name 07/11/24 17:21:23 07/11/24 1647 07/11/24 1408       CIWA-Ar    BP -- 109/75 --    Pulse -- 83 --    Nausea and Vomiting 0 2 2    Tactile Disturbances 0 4 2    Tremor 4 2 3    Auditory Disturbances 0 0 0    Paroxysmal Sweats 1 0 1    Visual Disturbances 0 4 0    Anxiety 3 3 2    Headache, Fullness in Head 0 0 0    Agitation 0 1 2    Orientation and Clouding of Sensorium 0 0 0    CIWA-Ar Total 8 16 12                   CIWA-Ar Score       Row Name 07/12/24 10:56:32 07/12/24 07:39:24 07/12/24 0344       CIWA-Ar    Nausea and Vomiting 0 0 0    Tactile Disturbances 0 0 0    Tremor 1 2 1    Auditory Disturbances 0 0 0    Paroxysmal Sweats 0 0 1    Visual Disturbances 0 0 0    Anxiety 4 2 2     Headache, Fullness in Head 0 0 1    Agitation 0 0 0    Orientation and Clouding of Sensorium 0 0 0    CIWA-Ar Total 5 4 5      Row Name 07/12/24 0240 07/11/24 2355 07/11/24 2100       CIWA-Ar    Nausea and Vomiting 0 0 0    Tactile Disturbances 0 0 0    Tremor 1 1 0    Auditory Disturbances 0 0 0    Paroxysmal Sweats 1 1 1    Visual Disturbances 0 0 0    Anxiety 4 3 0    Headache, Fullness in Head 3 2 2    Agitation 0 0 0    Orientation and Clouding of Sensorium 0 0 0    CIWA-Ar Total 9 7 3      Row Name 07/11/24 17:21:23 07/11/24 1647 07/11/24 1408       CIWA-Ar    BP -- 109/75 --    Pulse -- 83 --    Nausea and Vomiting 0 2 2    Tactile Disturbances 0 4 2    Tremor 4 2 3    Auditory Disturbances 0 0 0    Paroxysmal Sweats 1 0 1    Visual Disturbances 0 4 0    Anxiety 3 3 2    Headache, Fullness in Head 0 0 0    Agitation 0 1 2    Orientation and Clouding of Sensorium 0 0 0    CIWA-Ar Total 8 16 12                   CIWA-Ar Score       Row Name 07/12/24 10:56:32 07/12/24 07:39:24 07/12/24 0344       CIWA-Ar    Nausea and Vomiting 0 0 0    Tactile Disturbances 0 0 0    Tremor 1 2 1    Auditory Disturbances 0 0 0    Paroxysmal Sweats 0 0 1    Visual Disturbances 0 0 0    Anxiety 4 2 2    Headache, Fullness in Head 0 0 1    Agitation 0 0 0    Orientation and Clouding of Sensorium 0 0 0    CIWA-Ar Total 5 4 5      Row Name 07/12/24 0240 07/11/24 2355 07/11/24 2100       CIWA-Ar    Nausea and Vomiting 0 0 0    Tactile Disturbances 0 0 0    Tremor 1 1 0    Auditory Disturbances 0 0 0    Paroxysmal Sweats 1 1 1    Visual Disturbances 0 0 0    Anxiety 4 3 0    Headache, Fullness in Head 3 2 2    Agitation 0 0 0    Orientation and Clouding of Sensorium 0 0 0    CIWA-Ar Total 9 7 3      Row Name 07/11/24 17:21:23 07/11/24 1647 07/11/24 1408       CIWA-Ar    BP -- 109/75 --    Pulse -- 83 --    Nausea and Vomiting 0 2 2    Tactile Disturbances 0 4 2    Tremor 4 2 3    Auditory Disturbances 0 0 0    Paroxysmal Sweats 1 0 1     Visual Disturbances 0 4 0    Anxiety 3 3 2    Headache, Fullness in Head 0 0 0    Agitation 0 1 2    Orientation and Clouding of Sensorium 0 0 0    CIWA-Ar Total 8 16 12                   CIWA-Ar Score       Row Name 07/12/24 10:56:32 07/12/24 07:39:24 07/12/24 0344       CIWA-Ar    Nausea and Vomiting 0 0 0    Tactile Disturbances 0 0 0    Tremor 1 2 1    Auditory Disturbances 0 0 0    Paroxysmal Sweats 0 0 1    Visual Disturbances 0 0 0    Anxiety 4 2 2    Headache, Fullness in Head 0 0 1    Agitation 0 0 0    Orientation and Clouding of Sensorium 0 0 0    CIWA-Ar Total 5 4 5      Row Name 07/12/24 0240 07/11/24 2355 07/11/24 2100       CIWA-Ar    Nausea and Vomiting 0 0 0    Tactile Disturbances 0 0 0    Tremor 1 1 0    Auditory Disturbances 0 0 0    Paroxysmal Sweats 1 1 1    Visual Disturbances 0 0 0    Anxiety 4 3 0    Headache, Fullness in Head 3 2 2    Agitation 0 0 0    Orientation and Clouding of Sensorium 0 0 0    CIWA-Ar Total 9 7 3      Row Name 07/11/24 17:21:23 07/11/24 1647 07/11/24 1408       CIWA-Ar    BP -- 109/75 --    Pulse -- 83 --    Nausea and Vomiting 0 2 2    Tactile Disturbances 0 4 2    Tremor 4 2 3    Auditory Disturbances 0 0 0    Paroxysmal Sweats 1 0 1    Visual Disturbances 0 4 0    Anxiety 3 3 2    Headache, Fullness in Head 0 0 0    Agitation 0 1 2    Orientation and Clouding of Sensorium 0 0 0    CIWA-Ar Total 8 16 12                    Pertinent Labs/Diagnostic Test Results:   Radiology:  No orders to display     Cardiology:   7/11 ECG - Normal sinus rhythm  Rightward axis  Borderline ECG  GI:  No orders to display           Results from last 7 days   Lab Units 07/12/24  0507 07/11/24  1440   WBC Thousand/uL 4.00* 2.84*   HEMOGLOBIN g/dL 14.4 15.4   HEMATOCRIT % 41.5 44.5   PLATELETS Thousands/uL 214 219   TOTAL NEUT ABS Thousands/µL 2.96 0.77*         Results from last 7 days   Lab Units 07/12/24  0507 07/11/24  1440   SODIUM mmol/L 138 140   POTASSIUM mmol/L 3.6 5.0   CHLORIDE  mmol/L 101 104   CO2 mmol/L 25 26   ANION GAP mmol/L 12 10   BUN mg/dL 9 9   CREATININE mg/dL 0.84 0.90   EGFR ml/min/1.73sq m 116 113   CALCIUM mg/dL 8.4 8.3*   MAGNESIUM mg/dL 2.1  --      Results from last 7 days   Lab Units 07/12/24  0507 07/11/24  1440   AST U/L 38 51*   ALT U/L 33 35   ALK PHOS U/L 44 45   TOTAL PROTEIN g/dL 7.4 7.3   ALBUMIN g/dL 4.7 4.8   TOTAL BILIRUBIN mg/dL 1.04* 0.73         Results from last 7 days   Lab Units 07/12/24  0507 07/11/24  1440   GLUCOSE RANDOM mg/dL 104 83           Results from last 7 days   Lab Units 07/11/24  1440   LIPASE u/L 40         Results from last 7 days   Lab Units 07/11/24  1440   ETHANOL LVL mg/dL 312*         ED Treatment-Medication Administration from 07/11/2024 1404 to 07/11/2024 1714        Date/Time  Order  Dose  Route  Action  07/11/2024 1447  thiamine (VITAMIN B1) 100 mg, folic acid 1 mg, magnesium sulfate 2 g in dextrose 5 % and sodium chloride 0.9 % (D5-NS) 1,000 mL infusion  --  Intravenous  New Bag  07/11/2024 1441  diazepam (VALIUM) injection 10 mg  10 mg  Intravenous  Given  07/11/2024 1502  nicotine (NICODERM CQ) 21 mg/24 hr TD 24 hr patch 21 mg  21 mg  Transdermal  Medication Applied        Past Medical History:   Diagnosis Date    Alcohol withdrawal seizure ([facility])     Anxiety     Depression     High triglycerides     Pancreatitis      Present on Admission:   Nicotine dependence due to vaping tobacco product   Alcohol use disorder, severe, dependence ([facility]CC)      Admitting Diagnosis: Alcohol withdrawal ([facility]) [F10.939]  Alcohol intoxication ([facility]) [F10.929]  Age/Sex: 33 y.o. male  Admission Orders:  Scheduled Medications:  busPIRone, 10 mg, Oral, TID  chlordiazePOXIDE, 25 mg, Oral, Q8H BRANDIN  enoxaparin, 40 mg, Subcutaneous, Daily  folic acid, 1 mg, Oral, Daily  melatonin, 6 mg, Oral, HS  multivitamin-minerals, 1 tablet, Oral, Daily  nicotine, 21 mg, Transdermal, Daily  PARoxetine, 25 mg, Oral, QAM  thiamine, 100 mg, Oral, Daily      Continuous IV  Infusions:  multi-electrolyte, 75 mL/hr, Intravenous, Continuous      PRN Meds:  LORazepam, 1 mg, Intravenous, Q4H PRN - x 1 7/11, x 2 7/12  nicotine polacrilex, 2 mg, Oral, Q2H PRN - x 2 7/12  ondansetron, 4 mg, Intravenous, Q6H PRN    PO Thiamine, folic acid  IV fluids  Librium  CIWA  Seizure prec  Regular diet   IP CONSULT TO TOXICOLOGY    Network Utilization Review Department  ATTENTION: Please call with any questions or concerns to [phone] and carefully listen to the prompts so that you are directed to the right person. All voicemails are confidential.   For Discharge needs, contact Care Management DC Support Team at [phone] opt. 2  Send all requests for admission clinical reviews, approved or denied determinations and any other requests to dedicated fax number below belonging to the [place] where the patient is receiving treatment. List of dedicated fax numbers for the Facilities:  FACILITY NAME  UR FAX NUMBER  ADMISSION DENIALS (Administrative/Medical Necessity)  [phone]  DISCHARGE SUPPORT TEAM (NETWORK)  [phone]  PARENT CHILD HEALTH (Maternity/NICU/Pediatrics)  [phone]  [facility]  [phone]  [facility]  [phone]  [facility]  [phone]  [facility]  [phone]  [facility]  [phone]  [facility]  [phone]  [facility]  [phone]  [facility]  [phone]  [facility]  [phone]  [facility]  [phone]  [facility]  [phone]  [facility]  [phone]

## 2024-07-13 LAB
ALBUMIN SERPL BCG-MCNC: 3.9 G/DL (ref 3.5–5)
ALP SERPL-CCNC: 41 U/L (ref 34–104)
ALT SERPL W P-5'-P-CCNC: 23 U/L (ref 7–52)
ANION GAP SERPL CALCULATED.3IONS-SCNC: 9 MMOL/L (ref 4–13)
AST SERPL W P-5'-P-CCNC: 27 U/L (ref 13–39)
ATRIAL RATE: 85 BPM
BILIRUB SERPL-MCNC: 1.38 MG/DL (ref 0.2–1)
BUN SERPL-MCNC: 6 MG/DL (ref 5–25)
CALCIUM SERPL-MCNC: 8.2 MG/DL (ref 8.4–10.2)
CHLORIDE SERPL-SCNC: 105 MMOL/L (ref 96–108)
CO2 SERPL-SCNC: 23 MMOL/L (ref 21–32)
CREAT SERPL-MCNC: 0.73 MG/DL (ref 0.6–1.3)
ERYTHROCYTE [DISTWIDTH] IN BLOOD BY AUTOMATED COUNT: 13.2 % (ref 11.6–15.1)
GFR SERPL CREATININE-BSD FRML MDRD: 123 ML/MIN/1.73SQ M
GLUCOSE SERPL-MCNC: 106 MG/DL (ref 65–140)
HCT VFR BLD AUTO: 39.9 % (ref 36.5–49.3)
HGB BLD-MCNC: 14.1 G/DL (ref 12–17)
MCH RBC QN AUTO: 31.8 PG (ref 26.8–34.3)
MCHC RBC AUTO-ENTMCNC: 35.3 G/DL (ref 31.4–37.4)
MCV RBC AUTO: 90 FL (ref 82–98)
P AXIS: 42 DEGREES
PLATELET # BLD AUTO: 188 THOUSANDS/UL (ref 149–390)
PMV BLD AUTO: 9.5 FL (ref 8.9–12.7)
POTASSIUM SERPL-SCNC: 3.7 MMOL/L (ref 3.5–5.3)
PR INTERVAL: 140 MS
PROT SERPL-MCNC: 6.2 G/DL (ref 6.4–8.4)
QRS AXIS: 49 DEGREES
QRSD INTERVAL: 86 MS
QT INTERVAL: 332 MS
QTC INTERVAL: 395 MS
RBC # BLD AUTO: 4.43 MILLION/UL (ref 3.88–5.62)
SODIUM SERPL-SCNC: 137 MMOL/L (ref 135–147)
T WAVE AXIS: 51 DEGREES
VENTRICULAR RATE: 85 BPM
WBC # BLD AUTO: 3.34 THOUSAND/UL (ref 4.31–10.16)

## 2024-07-13 PROCEDURE — 85027 COMPLETE CBC AUTOMATED: CPT | Performed by: INTERNAL MEDICINE

## 2024-07-13 PROCEDURE — 99232 SBSQ HOSP IP/OBS MODERATE 35: CPT | Performed by: INTERNAL MEDICINE

## 2024-07-13 PROCEDURE — 80053 COMPREHEN METABOLIC PANEL: CPT | Performed by: INTERNAL MEDICINE

## 2024-07-13 PROCEDURE — 99222 1ST HOSP IP/OBS MODERATE 55: CPT | Performed by: EMERGENCY MEDICINE

## 2024-07-13 PROCEDURE — 93010 ELECTROCARDIOGRAM REPORT: CPT | Performed by: INTERNAL MEDICINE

## 2024-07-13 RX ORDER — CHLORDIAZEPOXIDE HYDROCHLORIDE 25 MG/1
25 CAPSULE, GELATIN COATED ORAL EVERY 8 HOURS SCHEDULED
Status: DISCONTINUED | OUTPATIENT
Start: 2024-07-13 | End: 2024-07-13

## 2024-07-13 RX ORDER — CHLORDIAZEPOXIDE HYDROCHLORIDE 25 MG/1
25 CAPSULE, GELATIN COATED ORAL
Status: DISCONTINUED | OUTPATIENT
Start: 2024-07-15 | End: 2024-07-13

## 2024-07-13 RX ORDER — CHLORDIAZEPOXIDE HYDROCHLORIDE 10 MG/1
10 CAPSULE, GELATIN COATED ORAL
Status: DISCONTINUED | OUTPATIENT
Start: 2024-07-15 | End: 2024-07-13

## 2024-07-13 RX ORDER — CHLORDIAZEPOXIDE HYDROCHLORIDE 25 MG/1
25 CAPSULE, GELATIN COATED ORAL EVERY 12 HOURS
Status: DISCONTINUED | OUTPATIENT
Start: 2024-07-14 | End: 2024-07-14 | Stop reason: HOSPADM

## 2024-07-13 RX ORDER — CHLORDIAZEPOXIDE HYDROCHLORIDE 10 MG/1
10 CAPSULE, GELATIN COATED ORAL
Status: DISCONTINUED | OUTPATIENT
Start: 2024-07-16 | End: 2024-07-14 | Stop reason: HOSPADM

## 2024-07-13 RX ORDER — CHLORDIAZEPOXIDE HYDROCHLORIDE 25 MG/1
25 CAPSULE, GELATIN COATED ORAL EVERY 8 HOURS SCHEDULED
Status: COMPLETED | OUTPATIENT
Start: 2024-07-13 | End: 2024-07-13

## 2024-07-13 RX ORDER — CHLORDIAZEPOXIDE HYDROCHLORIDE 25 MG/1
25 CAPSULE, GELATIN COATED ORAL EVERY 12 HOURS
Status: DISCONTINUED | OUTPATIENT
Start: 2024-07-14 | End: 2024-07-13

## 2024-07-13 RX ORDER — CHLORDIAZEPOXIDE HYDROCHLORIDE 25 MG/1
25 CAPSULE, GELATIN COATED ORAL
Status: DISCONTINUED | OUTPATIENT
Start: 2024-07-15 | End: 2024-07-14 | Stop reason: HOSPADM

## 2024-07-13 RX ADMIN — THIAMINE HCL TAB 100 MG 100 MG: 100 TAB at 08:26

## 2024-07-13 RX ADMIN — FOLIC ACID 1 MG: 1 TABLET ORAL at 08:26

## 2024-07-13 RX ADMIN — NICOTINE 14 MG: 14 PATCH, EXTENDED RELEASE TRANSDERMAL at 08:25

## 2024-07-13 RX ADMIN — MULTIPLE VITAMINS W/ MINERALS TAB 1 TABLET: TAB ORAL at 08:26

## 2024-07-13 RX ADMIN — POTASSIUM CHLORIDE: 2 INJECTION, SOLUTION, CONCENTRATE INTRAVENOUS at 03:42

## 2024-07-13 RX ADMIN — CHLORDIAZEPOXIDE HYDROCHLORIDE 25 MG: 25 CAPSULE ORAL at 05:10

## 2024-07-13 RX ADMIN — CHLORDIAZEPOXIDE HYDROCHLORIDE 25 MG: 25 CAPSULE ORAL at 00:24

## 2024-07-13 RX ADMIN — BUSPIRONE HYDROCHLORIDE 10 MG: 10 TABLET ORAL at 08:26

## 2024-07-13 RX ADMIN — BUSPIRONE HYDROCHLORIDE 10 MG: 10 TABLET ORAL at 16:41

## 2024-07-13 RX ADMIN — CHLORDIAZEPOXIDE HYDROCHLORIDE 25 MG: 25 CAPSULE ORAL at 21:12

## 2024-07-13 RX ADMIN — TRAZODONE HYDROCHLORIDE 50 MG: 50 TABLET ORAL at 21:12

## 2024-07-13 RX ADMIN — BUSPIRONE HYDROCHLORIDE 10 MG: 10 TABLET ORAL at 21:12

## 2024-07-13 RX ADMIN — CHLORDIAZEPOXIDE HYDROCHLORIDE 25 MG: 25 CAPSULE ORAL at 13:59

## 2024-07-13 NOTE — ED PROVIDER NOTES
History  Chief Complaint   Patient presents with    Detox Evaluation     Pt rpts 1/3 handle of vodka daily for 10-12days. Last drink 7/11 0830. Pt requesting detox, HX withdrawal seizures.      33-year-old male here for evaluation of alcohol withdrawal.  He has a history of alcohol abuse and was recently admitted to [facility] for withdrawal symptoms.  Prior to that he had been on 10 to 12-day binge, drinking over a bottle of vodka per day.  He was admitted to [facility] and was receiving basal Librium with boluses of IV Ativan for breakthrough symptoms.  Apparently this afternoon he signed out AGAINST MEDICAL ADVICE though he was still requiring IV Ativan at that time.  The patient states that shortly after he left the hospital he began to feel worse and felt that he needed to be readmitted.  He presented to our emergency department at that time.  Currently reports feeling tremulous and anxious.  No chest pain or shortness of breath.  No focal neurologic complaints.  No fevers.      Detox Evaluation  Associated symptoms: no abdominal pain, no nausea, no palpitations, no seizures, no shortness of breath, no suicidal ideation and no vomiting        Prior to Admission Medications   Prescriptions Last Dose Informant Patient Reported? Taking?   PARoxetine (PAXIL-CR) 25 mg 24 hr tablet   No No   Sig: Take 1 tablet (25 mg total) by mouth every morning   busPIRone (BUSPAR) 10 mg tablet   Yes No   Sig: Take 10 mg by mouth 3 (three) times a day   chlordiazePOXIDE (LIBRIUM) 25 mg capsule   No No   Sig: Take 1 capsule (25 mg total) by mouth every 8 (eight) hours for 3 days, THEN 1 capsule (25 mg total) 2 (two) times a day for 3 days.   folic acid (FOLVITE) 1 mg tablet   No No   Sig: Take 1 tablet (1 mg total) by mouth daily   thiamine 100 MG tablet   No No   Sig: Take 1 tablet (100 mg total) by mouth daily   traZODone (DESYREL) 50 mg tablet   No No   Sig: Take 1 tablet (50 mg total) by mouth daily at  bedtime      Facility-Administered Medications: None       Past Medical History:   Diagnosis Date    Alcohol withdrawal seizure (HCC)     Anxiety     Depression     High triglycerides     Pancreatitis        History reviewed. No pertinent surgical history.    Family History   Problem Relation Age of Onset    Heart disease Mother     Diabetes Mother      I have reviewed and agree with the history as documented.    E-Cigarette/Vaping    E-Cigarette Use Current Every Day User      E-Cigarette/Vaping Substances    Nicotine Yes     THC No     CBD No     Flavoring No     Other No     Unknown No      Social History     Tobacco Use    Smoking status: Never     Passive exposure: Never    Smokeless tobacco: Never   Vaping Use    Vaping status: Every Day    Substances: Nicotine   Substance Use Topics    Alcohol use: Yes     Alcohol/week: 17.0 standard drinks of alcohol     Types: 17 Shots of liquor per week    Drug use: Never       Review of Systems   Constitutional:  Negative for chills and fever.   HENT:  Negative for sore throat.    Eyes:  Negative for visual disturbance.   Respiratory:  Negative for cough and shortness of breath.    Cardiovascular:  Negative for chest pain and palpitations.   Gastrointestinal:  Negative for abdominal pain, nausea and vomiting.   Genitourinary:  Negative for dysuria and hematuria.   Musculoskeletal:  Negative for arthralgias and back pain.   Skin:  Negative for color change and rash.   Neurological:  Positive for tremors. Negative for seizures, syncope and light-headedness.   Psychiatric/Behavioral:  Negative for suicidal ideas. The patient is nervous/anxious.    All other systems reviewed and are negative.      Physical Exam  Physical Exam  Vitals and nursing note reviewed.   Constitutional:       General: He is not in acute distress.     Appearance: He is well-developed.   HENT:      Head: Normocephalic and atraumatic.   Eyes:      Conjunctiva/sclera: Conjunctivae normal.    Cardiovascular:      Rate and Rhythm: Regular rhythm. Tachycardia present.      Heart sounds: No murmur heard.  Pulmonary:      Effort: Pulmonary effort is normal. No respiratory distress.      Breath sounds: Normal breath sounds.   Abdominal:      Palpations: Abdomen is soft.      Tenderness: There is no abdominal tenderness.   Musculoskeletal:         General: No swelling.      Cervical back: Neck supple.   Skin:     General: Skin is warm and dry.      Capillary Refill: Capillary refill takes less than 2 seconds.   Neurological:      General: No focal deficit present.      Mental Status: He is alert and oriented to person, place, and time.      Comments: Mild to moderate resting tremor   Psychiatric:         Mood and Affect: Mood normal.         Vital Signs  ED Triage Vitals [07/12/24 1649]   Temperature Pulse Respirations Blood Pressure SpO2   99 °F (37.2 °C) 105 22 131/96 99 %      Temp Source Heart Rate Source Patient Position - Orthostatic VS BP Location FiO2 (%)   Tympanic Monitor Lying Left arm --      Pain Score       No Pain           Vitals:    07/12/24 1649 07/12/24 1836 07/12/24 1915 07/12/24 1925   BP: 131/96 125/56 134/89 157/100   Pulse: 105 81 100 83   Patient Position - Orthostatic VS: Lying  Sitting Sitting         Visual Acuity      ED Medications  Medications   busPIRone (BUSPAR) tablet 10 mg (has no administration in time range)   PARoxetine (PAXIL-CR) 24 hr tablet 25 mg (has no administration in time range)   traZODone (DESYREL) tablet 50 mg (has no administration in time range)   heparin (porcine) subcutaneous injection 5,000 Units (has no administration in time range)   chlordiazePOXIDE (LIBRIUM) capsule 25 mg (25 mg Oral Given 7/12/24 1943)   thiamine tablet 100 mg (has no administration in time range)   folic acid (FOLVITE) tablet 1 mg (has no administration in time range)   multivitamin-minerals (CENTRUM) tablet 1 tablet (has no administration in time range)   potassium chloride 20 mEq  in dextrose 5 % and sodium chloride 0.9 % (D5-NS) ( Intravenous New Bag 7/12/24 1944)   nicotine (NICODERM CQ) 14 mg/24hr TD 24 hr patch 14 mg (has no administration in time range)   sodium chloride 0.9 % bolus 1,000 mL (0 mL Intravenous Stopped 7/12/24 1850)   diazepam (VALIUM) injection 10 mg (10 mg Intravenous Given 7/12/24 1718)   diazepam (VALIUM) injection 10 mg (10 mg Intravenous Given 7/12/24 1818)   LORazepam (ATIVAN) injection 4 mg (4 mg Intravenous Given 7/12/24 1943)       Diagnostic Studies  Results Reviewed       Procedure Component Value Units Date/Time    Rapid drug screen, urine [[id number]]  (Abnormal) Collected: 07/12/24 1731    Lab Status: Final result Specimen: Urine, Clean Catch Updated: 07/12/24 1803     Amph/Meth UR Negative     Barbiturate Ur Negative     Benzodiazepine Urine Positive     Cocaine Urine Negative     Methadone Urine Negative     Opiate Urine Negative     PCP Ur Negative     THC Urine Negative     Oxycodone Urine Negative     Fentanyl Urine Negative     HYDROCODONE URINE Negative    Narrative:      Presumptive report. If requested, specimen will be sent to reference lab for confirmation.  FOR MEDICAL PURPOSES ONLY.   IF CONFIRMATION NEEDED PLEASE CONTACT THE LAB WITHIN 5 DAYS.    Drug Screen Cutoff Levels:  AMPHETAMINE/METHAMPHETAMINES  1000 ng/mL  BARBITURATES     200 ng/mL  BENZODIAZEPINES     200 ng/mL  COCAINE      300 ng/mL  METHADONE      300 ng/mL  OPIATES      300 ng/mL  PHENCYCLIDINE     25 ng/mL  THC       50 ng/mL  OXYCODONE      100 ng/mL  FENTANYL      5 ng/mL  HYDROCODONE     300 ng/mL    Basic metabolic panel [[id number]]  (Abnormal) Collected: 07/12/24 1714    Lab Status: Final result Specimen: Blood from Arm, Left Updated: 07/12/24 1742     Sodium 134 mmol/L      Potassium 3.1 mmol/L      Chloride 99 mmol/L      CO2 25 mmol/L      ANION GAP 10 mmol/L      BUN 9 mg/dL      Creatinine 0.83 mg/dL      Glucose 104 mg/dL      Calcium 8.8 mg/dL      eGFR 117  ml/min/1.73sq m     Narrative:      National Kid[employer] guidelines for Chronic Kidney Disease (CKD):     Stage 1 with normal or high GFR (GFR > 90 mL/min/1.73 square meters)    Stage 2 Mild CKD (GFR = 60-89 mL/min/1.73 square meters)    Stage 3A Moderate CKD (GFR = 45-59 mL/min/1.73 square meters)    Stage 3B Moderate CKD (GFR = 30-44 mL/min/1.73 square meters)    Stage 4 Severe CKD (GFR = 15-29 mL/min/1.73 square meters)    Stage 5 End Stage CKD (GFR <15 mL/min/1.73 square meters)  Note: GFR calculation is accurate only with a steady state creatinine    Hepatic function panel [[id number]]  (Abnormal) Collected: 07/12/24 1714    Lab Status: Final result Specimen: Blood from Arm, Left Updated: 07/12/24 1742     Total Bilirubin 1.50 mg/dL      Bilirubin, Direct 0.30 mg/dL      Alkaline Phosphatase 43 U/L      AST 35 U/L      ALT 31 U/L      Total Protein 7.0 g/dL      Albumin 4.6 g/dL     Magnesium [[id number]]  (Normal) Collected: 07/12/24 1714    Lab Status: Final result Specimen: Blood from Arm, Left Updated: 07/12/24 1742     Magnesium 2.0 mg/dL     Ethanol [[id number]]  (Normal) Collected: 07/12/24 1714    Lab Status: Final result Specimen: Blood from Arm, Left Updated: 07/12/24 1742     Ethanol Lvl <10 mg/dL     CBC and differential [[id number]]  (Abnormal) Collected: 07/12/24 1714    Lab Status: Final result Specimen: Blood from Hand, Left Updated: 07/12/24 1722     WBC 4.75 Thousand/uL      RBC 4.28 Million/uL      Hemoglobin 13.7 g/dL      Hematocrit 39.5 %      MCV 92 fL      MCH 32.0 pg      MCHC 34.7 g/dL      RDW 13.2 %      MPV 8.6 fL      Platelets 187 Thousands/uL      nRBC 0 /100 WBCs      Segmented % 67 %      Immature Grans % 0 %      Lymphocytes % 23 %      Monocytes % 8 %      Eosinophils Relative 1 %      Basophils Relative 1 %      Absolute Neutrophils 3.20 Thousands/µL      Absolute Immature Grans 0.01 Thousand/uL      Absolute Lymphocytes 1.09 Thousands/µL      Absolute Monocytes  0.38 Thousand/µL      Eosinophils Absolute 0.03 Thousand/µL      Basophils Absolute 0.04 Thousands/µL                    No orders to display              Procedures  CriticalCare Time    Date/Time: 7/12/2024 6:14 PM    Performed by: Diaz Gallegos MD  Authorized by: Diaz Gallegos MD    Critical care provider statement:     Critical care time (minutes):  35    Critical care time was exclusive of:  Separately billable procedures and treating other patients and teaching time    Critical care was necessary to treat or prevent imminent or life-threatening deterioration of the following conditions:  Toxidrome    Critical care was time spent personally by me on the following activities:  Blood draw for specimens, development of treatment plan with patient or surrogate, obtaining history from patient or surrogate, discussions with consultants, evaluation of patient's response to treatment, ordering and performing treatments and interventions, ordering and review of laboratory studies, re-evaluation of patient's condition, ordering and review of radiographic studies, review of old charts and examination of patient    I assumed direction of critical care for this patient from another provider in my specialty: no    Comments:      Acute alcohol withdrawal requiring IV benzodiazepines           ED Course  ED Course as of 07/12/24 2012 Fri Jul 12, 2024   1729 EKG independently interpreted by me: Normal sinus rhythm at rate of 85, normal axis, normal intervals, nonspecific ST abnormality likely related to artifact.  No significant ST elevation or depression to suggest cardiac ischemia.            CIWA-Ar Score       Row Name 07/12/24 1925 07/12/24 1656          CIWA-Ar    Nausea and Vomiting 0 0     Tactile Disturbances 2 0     Tremor 4 4     Auditory Disturbances 2 0     Paroxysmal Sweats 0 0     Visual Disturbances 0 0     Anxiety 6 4     Headache, Fullness in Head 1 0     Agitation 4 0     Orientation and Clouding of  Sensorium 0 0     CIWA-Ar Total 19 8                                       SBIRT 22yo+      Flowsheet Row Most Recent Value   Initial Alcohol Screen: US AUDIT-C     1. How often do you have a drink containing alcohol? 6 Filed at: 07/12/2024 1650   2. How many drinks containing alcohol do you have on a typical day you are drinking?  6 Filed at: 07/12/2024 1650   3a. Male UNDER 65: How often do you have five or more drinks on one occasion? 6 Filed at: 07/12/2024 1650   Audit-C Score 18 Filed at: 07/12/2024 1650   Full Alcohol Screen: US AUDIT    4. How often during the last year have you found that you were not able to stop drinking once you had started? 4 Filed at: 07/12/2024 1650   5. How often during past year have you failed to do what was normally expected of you because of drinking?  4 Filed at: 07/12/2024 1650   6. How often in past year have you needed a first drink in the morning to get yourself going after a heavy drinking session?  4 Filed at: 07/12/2024 1650   7. How often in past year have you had feeling of guilt or remorse after drinking?  4 Filed at: 07/12/2024 1650   8. How often in past year have you been unable to remember what happened night before because you had been drinking?  4 Filed at: 07/12/2024 1650   9. Have you or someone else been injured as a result of your drinking?  4 Filed at: 07/12/2024 1650   10. Has a relative, friend, doctor or other health worker been concerned about your drinking and suggested you cut down?  4 Filed at: 07/12/2024 1650   AUDIT Total Score 46 Filed at: 07/12/2024 1650   ANNALISE: How many times in the past year have you...    Used an illegal drug or used a prescription medication for non-medical reasons? Never Filed at: 07/12/2024 1650                      Medical Decision Making  33-year-old male with acute alcohol withdrawal.  Initially with tachycardia, hypertension, resting tremor.  Started on CIWA given 2 doses of 10 mg IV diazepam in ED.  Discussed with  medical toxicology.  Currently no detox beds are available.  Will admit to internal medicine with tox consult until a detox bed is available.    Amount and/or Complexity of Data Reviewed  Labs: ordered. Decision-making details documented in ED Course.    Risk  Prescription drug management.  Decision regarding hospitalization.                 Disposition  Final diagnoses:   Alcohol withdrawal (HCC)     Time reflects when diagnosis was documented in both MDM as applicable and the Disposition within this note       Time User Action Codes Description Comment    7/12/2024  6:12 PM Diaz Gallegos Add [F10.930] Alcohol withdrawal syndrome without complication (HCC)     7/12/2024  6:12 PM Diaz Gallegos Remove [F10.930] Alcohol withdrawal syndrome without complication (HCC)     7/12/2024  6:12 PM Diaz Gallegos Add [F10.939] Alcohol withdrawal (HCC)     7/12/2024  6:44 PM Chuck Love Add [Z78.9] Admitted to substance misuse detoxification center           ED Disposition       ED Disposition   Admit    Condition   Stable    Date/Time   Fri Jul 12, 2024 1812    Comment   Case was discussed with [place] and the patient's admission status was agreed to be Admission Status: inpatient status to the service of Dr. Love.               Follow-up Information    None         Current Discharge Medication List        CONTINUE these medications which have NOT CHANGED    Details   busPIRone (BUSPAR) 10 mg tablet Take 10 mg by mouth 3 (three) times a day      chlordiazePOXIDE (LIBRIUM) 25 mg capsule Take 1 capsule (25 mg total) by mouth every 8 (eight) hours for 3 days, THEN 1 capsule (25 mg total) 2 (two) times a day for 3 days.  Qty: 15 capsule, Refills: 0    Associated Diagnoses: Alcohol intoxication (HCC)      folic acid (FOLVITE) 1 mg tablet Take 1 tablet (1 mg total) by mouth daily  Qty: 30 tablet, Refills: 0    Associated Diagnoses: Alcohol intoxication (HCC)      PARoxetine (PAXIL-CR) 25 mg 24 hr tablet Take 1 tablet (25 mg  total) by mouth every morning  Qty: 90 tablet, Refills: 1    Associated Diagnoses: Anxiety      thiamine 100 MG tablet Take 1 tablet (100 mg total) by mouth daily  Qty: 30 tablet, Refills: 0    Associated Diagnoses: Alcoholic intoxication with complication (HCC)      traZODone (DESYREL) 50 mg tablet Take 1 tablet (50 mg total) by mouth daily at bedtime  Qty: 90 tablet, Refills: 0    Associated Diagnoses: Anxiety             No discharge procedures on file.    PDMP Review         Value Time User    PDMP Reviewed  Yes 6/23/2024  2:54 PM FARZANEH Padilla            ED Provider  Electronically Signed by             Diaz Gallegos MD  07/12/24 2016

## 2024-07-13 NOTE — PLAN OF CARE
Problem: Potential for Falls  Goal: Patient will remain free of falls  Description: INTERVENTIONS:  - Educate patient/family on patient safety including physical limitations  - Instruct patient to call for assistance with activity   - Consult OT/PT to assist with strengthening/mobility   - Keep Call bell within reach  - Keep bed low and locked with side rails adjusted as appropriate  - Keep care items and personal belongings within reach  - Initiate and maintain comfort rounds  - Make Fall Risk Sign visible to staff  - Apply yellow socks and bracelet for high fall risk patients  - Consider moving patient to room near nurses station  7/12/2024 2023 by iNtin Guidry, RN  Outcome: Progressing  7/12/2024 2023 by Nitin Guidry, RN  Outcome: Progressing     Problem: SUBSTANCE USE/ABUSE  Goal: By discharge, will develop insight into their chemical dependency and sustain motivation to continue in recovery  Description: INTERVENTIONS:  - Attends all daily group sessions and scheduled AA groups  - Actively practices coping skills through participation in the therapeutic community and adherence to program rules  - Reviews and completes assignments from individual treatment plan  - Assist patient development of understanding of their personal cycle of addiction and relapse triggers  Outcome: Progressing  Goal: By discharge, patient will have ongoing treatment plan addressing chemical dependency  Description: INTERVENTIONS:  - Assist patient with resources and/or appointments for ongoing recovery based living  Outcome: Progressing

## 2024-07-13 NOTE — CONSULTS
Consultation - Medical Toxicology  Eliud Patrick Clarke 33 y.o. male MRN: [id number]  Unit/Bed#: 5T DETOX 512-01 Encounter: [id number]    Reason for Consult / Principal Problem: withdrawal    Inpatient consult to Toxicology  Consult performed by: Jenny Payan MD  Consult ordered by: Chuck Love DO        07/13/24    ASSESSMENT:  Alcohol withdrawal  Alcohol use disorder  Hypocalcemia  Elevated T. Bili  Leukopenia    RECOMMENDATIONS:    Patient reports being very well-managed on chlordiazepoxide at this time.  I would not change this.    Can maintain patient on CIWA/benzodiazepines for breakthrough symptoms.    Plan for Librium taper, likely able to be discharged by tomorrow.  An example librium taper would be:   Day 1 25 mg every 6 hr  Day 2 25 mg every 8 hr  Day 3 25 mg every 12 hr  Day 4 25 mg bedtime  Day 5 12.5 mg bedtime  Day 6 12.5 mg bedtime     At this point in time, patient can be spaced out from every 6 to every 8 hours.  Ablation of calcium per primary team.    For further questions, please call [facility]  Service or Patient Access Center to reach the medical  on call.     Please see additional teaching note below (if available)    Ethanol Withdrawal Discussion  Sudden discontinuation of chronic ethanol use can precipitate a severe withdrawal syndrome that include symptoms tremor, anxiety, headache, palpitations, insomnia, nausea and vomiting, tachycardia and hypertension. Seizures may also occur, generally within 6-12 hours after cessation of ethanol use.  As a result of chronic ethanol abuse, NANCY activity down regulatese while NMDA activity upregulates. Sympathetic nervous system overactivity results and may progress to delirium tremens.  Delirium tremens is a life-threatening condition that is characterized by tachycardia, diaphoresis, hyperthermia, delirium and hallucinations.  It usually occurs about 48-72 hours after discontinuation of heavy ethanol use.  If not treated  appropriately, significant morbidity and mortality can be associated. Early treatment is most effective and options include benzodiazepines or barbiturates.       Hx and PE limited by: None    HPI: Eliud Clarke is a 33 y.o. year old male who was admitted for management of alcohol withdrawal.  Past medical history significant for alcohol use disorder, alcohol withdrawal, anxiety, depression.  Patient drinks approximately one third handle of vodka daily, with his last drink being on 7/11.  Patient was initially admitted to [facility] for detox management, however left AMA and came to [facility] for admission.  Patient was started on Librium and overall feels better this morning.  Patient wants outpatient resources at this time and feels like he can go home by tomorrow.  Denies chest pain, shortness of breath, dizziness, weakness, tremors, vomiting, nausea, diarrhea.    Review of Systems   Constitutional:  Negative for appetite change, chills and fever.   Eyes:  Negative for visual disturbance.   Respiratory:  Negative for shortness of breath.    Cardiovascular:  Negative for chest pain.   Gastrointestinal:  Negative for abdominal pain, nausea and vomiting.   Genitourinary:  Negative for dysuria, flank pain, frequency and urgency.   Musculoskeletal:  Negative for myalgias and neck stiffness.   Skin:  Negative for color change.   Neurological:  Negative for dizziness, tremors and weakness.   Psychiatric/Behavioral:  Negative for agitation. The patient is not nervous/anxious.        Historical Information   Past Medical History:   Diagnosis Date    Alcohol withdrawal seizure (HCC)     Anxiety     Depression     High triglycerides     Pancreatitis      History reviewed. No pertinent surgical history.  Social History   Social History     Substance and Sexual Activity   Alcohol Use Yes    Alcohol/week: 17.0 standard drinks of alcohol    Types: 17 Shots of liquor per week     Social History     Substance and  Sexual Activity   Drug Use Never     Social History     Tobacco Use   Smoking Status Never    Passive exposure: Never   Smokeless Tobacco Never     Family History   Problem Relation Age of Onset    Heart disease Mother     Diabetes Mother      Prior to Admission medications    Medication Sig Start Date End Date Taking? Authorizing Provider   busPIRone (BUSPAR) 10 mg tablet Take 10 mg by mouth 3 (three) times a day 7/1/24   Historical Provider, MD   chlordiazePOXIDE (LIBRIUM) 25 mg capsule Take 1 capsule (25 mg total) by mouth every 8 (eight) hours for 3 days, THEN 1 capsule (25 mg total) 2 (two) times a day for 3 days. 7/12/24 7/18/24  Susan Muñoz PA-C   folic acid (FOLVITE) 1 mg tablet Take 1 tablet (1 mg total) by mouth daily 7/13/24 8/12/24  Susan Muñoz PA-C   PARoxetine (PAXIL-CR) 25 mg 24 hr tablet Take 1 tablet (25 mg total) by mouth every morning 4/18/24   FARZANEH Padilla   thiamine 100 MG tablet Take 1 tablet (100 mg total) by mouth daily 7/12/24 8/11/24  Susan Muñoz PA-C   traZODone (DESYREL) 50 mg tablet Take 1 tablet (50 mg total) by mouth daily at bedtime 7/12/24   Susan Muñoz PA-C     Current Facility-Administered Medications   Medication Dose Route Frequency    busPIRone (BUSPAR) tablet 10 mg  10 mg Oral TID    chlordiazePOXIDE (LIBRIUM) capsule 25 mg  25 mg Oral Q6H [facility]    folic acid (FOLVITE) tablet 1 mg  1 mg Oral Daily    heparin (porcine) subcutaneous injection 5,000 Units  5,000 Units Subcutaneous Q8H [facility]    multivitamin-minerals (CENTRUM) tablet 1 tablet  1 tablet Oral Daily    nicotine (NICODERM CQ) 14 mg/24hr TD 24 hr patch 14 mg  14 mg Transdermal Daily    PARoxetine (PAXIL-CR) 24 hr tablet 25 mg  25 mg Oral QAM    thiamine tablet 100 mg  100 mg Oral Daily    traZODone (DESYREL) tablet 50 mg  50 mg Oral HS     Allergies   Allergen Reactions    Erythromycin Rash     Objective     Intake/Output Summary (Last 24 hours) at 7/13/2024  1101  Last data filed at 7/12/2024 1850  Gross per 24 hour   Intake 1000 ml   Output 725 ml   Net 275 ml       Invasive Devices:        Vitals   Vitals:    07/12/24 2043 07/13/24 0030 07/13/24 0407 07/13/24 0822   BP: 103/72 106/64 119/80 131/87   TempSrc: Temporal Temporal Temporal Temporal   Pulse: 72 64 66 88   Resp: 18 18 18 16   Patient Position - Orthostatic VS: Lying Lying Lying Sitting   Temp: (!) 97 °F (36.1 °C) (!) 97.2 °F (36.2 °C) (!) 97.1 °F (36.2 °C) (!) 97 °F (36.1 °C)       Physical Exam  Vitals and nursing note reviewed.   Constitutional:       General: He is not in acute distress.     Appearance: Normal appearance. He is well-developed. He is not ill-appearing, toxic-appearing or diaphoretic.   HENT:      Head: Normocephalic and atraumatic.      Right Ear: External ear normal.      Left Ear: External ear normal.      Nose: Nose normal.      Mouth/Throat:      Mouth: Oropharynx is clear and moist. Mucous membranes are moist.      Pharynx: No oropharyngeal exudate.   Eyes:      General:         Left eye: No discharge.      Extraocular Movements: Extraocular movements intact and EOM normal.      Conjunctiva/sclera: Conjunctivae normal.      Pupils: Pupils are equal, round, and reactive to light.   Cardiovascular:      Rate and Rhythm: Normal rate.   Pulmonary:      Effort: Pulmonary effort is normal. No respiratory distress.      Breath sounds: No stridor.   Abdominal:      General: Abdomen is flat.   Musculoskeletal:         General: No deformity or edema. Normal range of motion.      Cervical back: Normal range of motion and neck supple.      Right lower leg: No edema.      Left lower leg: No edema.   Skin:     General: Skin is warm and dry.      Findings: No erythema or rash.      Comments: Appears flushed/slightly plethoric   Neurological:      Mental Status: He is alert and oriented to person, place, and time.      Motor: No weakness or tremor.   Psychiatric:         Attention and Perception:  "Attention and perception normal.         Mood and Affect: Mood normal.         Behavior: Behavior normal. Behavior is cooperative.         EKG, Pathology, and Other Studies: I have personally reviewed pertinent reports.    Normal sinus rhythm 85, QRS 86, QTc 395, no ST elevation or depression, no ectopy, no terminal R.  Overall impression: No STEMI.  No toxicologic findings    Lab Results: I have personally reviewed pertinent reports.      Labs:  Results from last 7 days   Lab Units 07/13/24  0426 07/12/24  1714 07/12/24  0507 07/11/24  1440   WBC Thousand/uL 3.34* 4.75 4.00* 2.84*   HEMOGLOBIN g/dL 14.1 13.7 14.4 15.4   HEMATOCRIT % 39.9 39.5 41.5 44.5   PLATELETS Thousands/uL 188 187 214 219   SEGS PCT %  --  67 74 27*   LYMPHO PCT %  --  23 19 62*   MONO PCT %  --  8 6 9   EOS PCT %  --  1 0 1      Results from last 7 days   Lab Units 07/13/24 0426 07/12/24  1714   SODIUM mmol/L 137 134*   POTASSIUM mmol/L 3.7 3.1*   CHLORIDE mmol/L 105 99   CO2 mmol/L 23 25   BUN mg/dL 6 9   CREATININE mg/dL 0.73 0.83   CALCIUM mg/dL 8.2* 8.8   ALK PHOS U/L 41 43   ALT U/L 23 31   AST U/L 27 35   MAGNESIUM mg/dL  --  2.0              No results found for: \"TROPONINI\"      Results from last 7 days   Lab Units 07/12/24  1714   ETHANOL LVL mg/dL <10     Invalid input(s): \"EXTPREGUR\"    Imaging Studies: I have personally reviewed pertinent reports.      Counseling / Coordination of Care  Total floor / unit time spent today 35 minutes. Greater than 50% of total time was spent with the patient and / or family counseling and / or coordination of care.     "

## 2024-07-13 NOTE — PROGRESS NOTES
[facility]  Progress Note  Name: Eliud Clarke I  MRN: [id number]  Unit/Bed#: 5T DETOX 512-01 I Date of Admission: 7/12/2024   Date of Service: 7/13/2024 I Hospital Day: 1    Assessment & Plan   * Admitted to substance misuse detoxification center  Assessment & Plan  History of anxiety/depression and alcohol use presents for detox evaluation  Patient was admitted 7/11 into 7/12 at [facility] for similar but left AMA.  Patient reports consuming one third handle of vodka daily last drink prior to yesterday's admission.  Due to lack of staffing on detox unit patient being admitted to medical for now.  Doing well on current CIWA protocol with scheduled chlordiazepoxide.  Awaiting toxicology evaluation and recommendations    Nicotine dependence due to vaping tobacco product  Assessment & Plan  Patient vapes.  Ordered nicotine patch    Hypokalemia  Assessment & Plan  Hyponatremia and hypokalemia: Currently on D5 NS + KCl.  Will discontinue IV fluids    Results from last 7 days   Lab Units 07/13/24  0426 07/12/24  1714 07/12/24  0507 07/11/24  1440   SODIUM mmol/L 137 134* 138 140   POTASSIUM mmol/L 3.7 3.1* 3.6 5.0       Anxiety  Assessment & Plan  Mood stable.  Continue paroxetine 25 mg and trazodone 50 mg at bedtime    VTE Pharmacologic Prophylaxis:   Moderate Risk (Score 3-4) - Pharmacological DVT Prophylaxis Ordered: heparin.    Mobility:  Basic Mobility Inpatient Raw Score: 22  JH-HLM Goal: 7: Walk 25 feet or more  JH-HLM Achieved: 8: Walk 250 feet ot more  JH-HLM Goal achieved. Continue to encourage appropriate mobility.    Patient Centered Rounds: I have performed bedside rounds with nursing staff today.  Discussions with Specialists or Other Care Team Provider:     Education and Discussions with Family / Patient:     Time Spent for Care:   This time was spent on one or more of the following: performing physical exam; counseling and coordination of care;  "obtaining or reviewing history; documenting in the medical record; reviewing/ordering tests, medications or procedures; communicating with other healthcare professionals and discussing with patient's family/caregivers.    Current Length of Stay: 1 day(s)  Current Patient Status: Inpatient   Certification Statement:   Discharge Plan: Toxicology to evaluate for further recommendations    Code Status: Level 1 - Full Code      Subjective:   Patient seen and examined.  Slept very well.  Brushing teeth no complaints.    Objective:   Vitals: Blood pressure 131/87, pulse 88, temperature (!) 97 °F (36.1 °C), temperature source Temporal, resp. rate 16, height 5' 11\" (1.803 m), weight 90.7 kg (200 lb), SpO2 96%.    Intake/Output Summary (Last 24 hours) at 7/13/2024 0907  Last data filed at 7/12/2024 1850  Gross per 24 hour   Intake 1000 ml   Output 725 ml   Net 275 ml       Physical Exam  Vitals reviewed.   Constitutional:       General: He is not in acute distress.  HENT:      Head: Atraumatic.   Cardiovascular:      Rate and Rhythm: Regular rhythm.   Pulmonary:      Effort: Pulmonary effort is normal.      Breath sounds: No wheezing.   Abdominal:      General: Bowel sounds are normal.      Palpations: Abdomen is soft.      Tenderness: There is no abdominal tenderness.   Musculoskeletal:         General: No swelling.   Skin:     General: Skin is warm and dry.   Neurological:      General: No focal deficit present.      Mental Status: He is alert.      Motor: No weakness.   Psychiatric:         Mood and Affect: Mood normal.       Additional Data:   Labs:  Results from last 7 days   Lab Units 07/13/24  0426 07/12/24  1714 07/12/24  0507   WBC Thousand/uL 3.34* 4.75 4.00*   HEMOGLOBIN g/dL 14.1 13.7 14.4   PLATELETS Thousands/uL 188 187 214   MCV fL 90 92 90     Results from last 7 days   Lab Units 07/13/24  0426 07/12/24  1714 07/12/24  0507   SODIUM mmol/L 137 134* 138   POTASSIUM mmol/L 3.7 3.1* 3.6   CHLORIDE mmol/L 105 99 " 101   CO2 mmol/L 23 25 25   ANION GAP mmol/L 9 10 12   BUN mg/dL 6 9 9   CREATININE mg/dL 0.73 0.83 0.84   CALCIUM mg/dL 8.2* 8.8 8.4   ALBUMIN g/dL 3.9 4.6 4.7   TOTAL BILIRUBIN mg/dL 1.38* 1.50* 1.04*   ALK PHOS U/L 41 43 44   ALT U/L 23 31 33   AST U/L 27 35 38   EGFR ml/min/1.73sq m 123 117 116   GLUCOSE RANDOM mg/dL 106 104 104     Results from last 7 days   Lab Units 07/12/24  1714 07/12/24  0507   MAGNESIUM mg/dL 2.0 2.1                                  * I Have Reviewed All Lab Data Listed Above.    Recent cultures:                   Lines/Drains:  Invasive Devices       None                         Telemetry:      Imaging:  Imaging Reports Reviewed Today Include:   No results found.    Scheduled Meds:  Current Facility-Administered Medications   Medication Dose Route Frequency Provider Last Rate    busPIRone  10 mg Oral TID Chuck Love DO      chlordiazePOXIDE  25 mg Oral Q6H [facility] Chuck Love DO      folic acid  1 mg Oral Daily Chuck Love DO      heparin (porcine)  5,000 Units Subcutaneous Q8H [facility] Chuck Love DO      multivitamin-minerals  1 tablet Oral Daily Chuck Love DO      nicotine  14 mg Transdermal Daily Yudy Kelly MD      PARoxetine  25 mg Oral QAM Chuck Love DO      potassium chloride 20 mEq in dextrose 5 % and sodium chloride 0.9 % (D5-NS)   Intravenous Continuous Chuck Love DO Stopped (07/13/24 0820)    thiamine  100 mg Oral Daily Chuck Love DO      traZODone  50 mg Oral HS Chuck Love DO         Today, Patient Was Seen By: Chuck Love DO    ** Please Note: Dictation voice to text software may have been used in the creation of this document. **

## 2024-07-13 NOTE — ASSESSMENT & PLAN NOTE
Hyponatremia and hypokalemia: Currently on D5 NS + KCl.  Will discontinue IV fluids    Results from last 7 days   Lab Units 07/13/24  0426 07/12/24  1714 07/12/24  0507 07/11/24  1440   SODIUM mmol/L 137 134* 138 140   POTASSIUM mmol/L 3.7 3.1* 3.6 5.0

## 2024-07-13 NOTE — TREATMENT PLAN
Treatment plan    Plan of care discussed with patient and his parents on telephone. Parents request case management eval for resources inpatient vs outpatient services because the patient has relapsed several times in the recent past. Patient agreeable to stay until tomorrow.    Chuck Love DO FACP

## 2024-07-13 NOTE — ASSESSMENT & PLAN NOTE
History of anxiety/depression and alcohol use presents for detox evaluation  Patient was admitted 7/11 into 7/12 at [facility] for similar but left AMA.  Patient reports consuming one third handle of vodka daily last drink prior to yesterday's admission.  Due to lack of staffing on detox unit patient being admitted to medical for now.  Doing well on current [facility] protocol with scheduled chlordiazepoxide.  Awaiting toxicology evaluation and recommendations

## 2024-07-14 ENCOUNTER — TRANSITIONAL CARE MANAGEMENT (OUTPATIENT)
Dept: FAMILY MEDICINE CLINIC | Facility: CLINIC | Age: 34
End: 2024-07-14

## 2024-07-14 VITALS
HEIGHT: 71 IN | WEIGHT: 200 LBS | HEART RATE: 89 BPM | RESPIRATION RATE: 16 BRPM | SYSTOLIC BLOOD PRESSURE: 138 MMHG | TEMPERATURE: 97.1 F | OXYGEN SATURATION: 95 % | DIASTOLIC BLOOD PRESSURE: 84 MMHG | BODY MASS INDEX: 28 KG/M2

## 2024-07-14 LAB
ALBUMIN SERPL BCG-MCNC: 4.6 G/DL (ref 3.5–5)
ALP SERPL-CCNC: 46 U/L (ref 34–104)
ALT SERPL W P-5'-P-CCNC: 31 U/L (ref 7–52)
ANION GAP SERPL CALCULATED.3IONS-SCNC: 10 MMOL/L (ref 4–13)
AST SERPL W P-5'-P-CCNC: 34 U/L (ref 13–39)
BILIRUB SERPL-MCNC: 1.16 MG/DL (ref 0.2–1)
BUN SERPL-MCNC: 6 MG/DL (ref 5–25)
CALCIUM SERPL-MCNC: 9.2 MG/DL (ref 8.4–10.2)
CHLORIDE SERPL-SCNC: 102 MMOL/L (ref 96–108)
CO2 SERPL-SCNC: 26 MMOL/L (ref 21–32)
CREAT SERPL-MCNC: 0.85 MG/DL (ref 0.6–1.3)
ERYTHROCYTE [DISTWIDTH] IN BLOOD BY AUTOMATED COUNT: 13.1 % (ref 11.6–15.1)
GFR SERPL CREATININE-BSD FRML MDRD: 116 ML/MIN/1.73SQ M
GLUCOSE SERPL-MCNC: 100 MG/DL (ref 65–140)
HCT VFR BLD AUTO: 45 % (ref 36.5–49.3)
HGB BLD-MCNC: 15.3 G/DL (ref 12–17)
MCH RBC QN AUTO: 31.7 PG (ref 26.8–34.3)
MCHC RBC AUTO-ENTMCNC: 34 G/DL (ref 31.4–37.4)
MCV RBC AUTO: 93 FL (ref 82–98)
PLATELET # BLD AUTO: 169 THOUSANDS/UL (ref 149–390)
PMV BLD AUTO: 9 FL (ref 8.9–12.7)
POTASSIUM SERPL-SCNC: 3.6 MMOL/L (ref 3.5–5.3)
PROT SERPL-MCNC: 7.4 G/DL (ref 6.4–8.4)
RBC # BLD AUTO: 4.82 MILLION/UL (ref 3.88–5.62)
SODIUM SERPL-SCNC: 138 MMOL/L (ref 135–147)
WBC # BLD AUTO: 4.59 THOUSAND/UL (ref 4.31–10.16)

## 2024-07-14 PROCEDURE — 80053 COMPREHEN METABOLIC PANEL: CPT | Performed by: FAMILY MEDICINE

## 2024-07-14 PROCEDURE — 85027 COMPLETE CBC AUTOMATED: CPT | Performed by: FAMILY MEDICINE

## 2024-07-14 PROCEDURE — 99239 HOSP IP/OBS DSCHRG MGMT >30: CPT | Performed by: INTERNAL MEDICINE

## 2024-07-14 RX ORDER — CHLORDIAZEPOXIDE HYDROCHLORIDE 5 MG/1
CAPSULE, GELATIN COATED ORAL
Qty: 19 CAPSULE | Refills: 0 | Status: SHIPPED | OUTPATIENT
Start: 2024-07-14 | End: 2024-07-18

## 2024-07-14 RX ORDER — LANOLIN ALCOHOL/MO/W.PET/CERES
3 CREAM (GRAM) TOPICAL
Status: DISCONTINUED | OUTPATIENT
Start: 2024-07-14 | End: 2024-07-14

## 2024-07-14 RX ORDER — LANOLIN ALCOHOL/MO/W.PET/CERES
3 CREAM (GRAM) TOPICAL
Status: DISCONTINUED | OUTPATIENT
Start: 2024-07-14 | End: 2024-07-14 | Stop reason: HOSPADM

## 2024-07-14 RX ADMIN — NICOTINE 14 MG: 14 PATCH, EXTENDED RELEASE TRANSDERMAL at 08:19

## 2024-07-14 RX ADMIN — BUSPIRONE HYDROCHLORIDE 10 MG: 10 TABLET ORAL at 08:19

## 2024-07-14 RX ADMIN — THIAMINE HCL TAB 100 MG 100 MG: 100 TAB at 08:19

## 2024-07-14 RX ADMIN — CHLORDIAZEPOXIDE HYDROCHLORIDE 25 MG: 25 CAPSULE ORAL at 05:26

## 2024-07-14 RX ADMIN — MELATONIN TAB 3 MG 3 MG: 3 TAB at 03:12

## 2024-07-14 RX ADMIN — FOLIC ACID 1 MG: 1 TABLET ORAL at 08:19

## 2024-07-14 RX ADMIN — MULTIPLE VITAMINS W/ MINERALS TAB 1 TABLET: TAB ORAL at 08:19

## 2024-07-14 NOTE — ASSESSMENT & PLAN NOTE
History of anxiety/depression and alcohol use presents for detox evaluation  Patient was admitted 7/11 into 7/12 at [facility] for similar but left AMA.  Patient reports consuming one third handle of vodka daily last drink prior to admission.  Was seen by toxicology on detox unit.  Doing well with chlordiazepoxide taper.  Will discharge with taper as recommended

## 2024-07-14 NOTE — ASSESSMENT & PLAN NOTE
Hyponatremia and hypokalemia: Was on D5 NS + KCl.  Electrolytes within limits at time of discharge    Results from last 7 days   Lab Units 07/14/24  0305 07/13/24  0426 07/12/24  1714 07/12/24  0507   SODIUM mmol/L 138 137 134* 138   POTASSIUM mmol/L 3.6 3.7 3.1* 3.6

## 2024-07-14 NOTE — UTILIZATION REVIEW
Initial Clinical Review      Admission: Date/Time/Statement:   Admission Orders (From admission, onward)       Ordered        07/12/24 1812  INPATIENT ADMISSION  Once                          Orders Placed This Encounter   Procedures    INPATIENT ADMISSION     Standing Status:   Standing     Number of Occurrences:   1     Order Specific Question:   Level of Care     Answer:   Med Surg [16]     Order Specific Question:   Estimated length of stay     Answer:   More than 2 Midnights     Order Specific Question:   Certification     Answer:   I certify that inpatient services are medically necessary for this patient for a duration of greater than two midnights. See H&P and MD Progress Notes for additional information about the patient's course of treatment.     ED Arrival Information       Expected   -    Arrival   7/12/2024 16:43    Acuity   Urgent              Means of arrival   Walk-In    Escorted by   Self    Service   Hospitalist    Admission type   Emergency              Arrival complaint   detox             Chief Complaint   Patient presents with    Detox Evaluation     Pt rpts 1/3 handle of vodka daily for 10-12days. Last drink 7/11 0830. Pt requesting detox, HX withdrawal seizures.        Initial Presentation: 33 y.o. male who presented to ED after leaving AMA earlier in the day from another facility. . Inpatient admission for evaluation and treatment of alcohol withdrawal syndrome. Presented w/ need for detox from alcohol. Serum ETOH: 312. Reports 1/3 handle daily, last drink on 7/11 @ 0830. Has prior rehab treatment for withdrawal. Reports hx of withdrawal seizures. On exam, nervous, anxious. CIWA 19 Plan: SEWS monitoring w/ phenobarbital management, PO thiamine/folic acid supplement, IVF, telemetry, continuous pulse ox, continue PTA meds, trend labs, replete electrolytes as needed.     Anticipated Length of Stay:  Patient will be admitted on an Inpatient basis with an anticipated length of stay of  >2   midnights.   This patient qualifies for Level IV medically managed intensive inpatient services under the criteria set by the American Society of Addiction Medicine, including dimensions 1-3. The patient is in withdrawal (or is intoxicated with high risk of withdrawal), with severe and unstable medical and/or psychiatric (dual diagnosis) problems, requiring requires 24-hour medical and nursing care and the full resources of a licensed hospital.      Date: 7/13       Day 2: Pt reports headache, tremor. On exam, nausea, vomiting, tachycardic, flushed . Plan: continue SEWS monitoring w/ phenobarbital management, PO thiamine/folic acid supplement, telemetry, continuous pulse ox, continue current meds, trend labs, replete electrolytes as needed.     ED Triage Vitals [07/12/24 1649]   Temperature Pulse Respirations Blood Pressure SpO2 Pain Score   99 °F (37.2 °C) 105 22 131/96 99 % No Pain     Weight (last 2 days)       Date/Time Weight    07/12/24 1925 90.7 (200)    07/12/24 1649 92 (202.8)            Vital Signs (last 3 days)       Date/Time Temp Pulse Resp BP MAP (mmHg) SpO2 O2 Device Patient Position - Orthostatic VS Betsy Coma Scale Score CIWA-Ar Total Pain    07/14/24 0757 -- -- -- -- -- -- -- -- -- -- No Pain    07/14/24 0252 97.1 °F (36.2 °C) 89 16 138/84 102 95 % None (Room air) Sitting -- 3 --    07/13/24 1932 96.9 °F (36.1 °C) 81 16 121/86 97 96 % None (Room air) Sitting 15 4 No Pain    07/13/24 1627 96.7 °F (35.9 °C) 72 16 128/88 -- 95 % None (Room air) Lying -- 0 --    07/13/24 1204 97.4 °F (36.3 °C) 71 16 122/80 -- 97 % None (Room air) -- -- -- --    07/13/24 0900 -- -- -- -- -- -- -- -- 15 -- No Pain    07/13/24 0822 97 °F (36.1 °C) 88 16 131/87 -- 96 % None (Room air) Sitting -- 4 --    07/13/24 0407 97.1 °F (36.2 °C) 66 18 119/80 93 97 % None (Room air) Lying -- 0 --    07/13/24 0030 97.2 °F (36.2 °C) 64 18 106/64 78 96 % None (Room air) Lying -- 4 --    07/12/24 2043 97 °F (36.1 °C) 72 18 103/72 82 96  % None (Room air) Lying -- 3 --    07/12/24 1932 -- -- -- -- -- -- -- -- 15 -- 2    07/12/24 1925 97.5 °F (36.4 °C) 83 18 157/100 119 96 % None (Room air) Sitting -- 19 --    07/12/24 1915 -- 100 20 134/89 -- -- -- Sitting -- -- --    07/12/24 1836 -- 81 20 125/56 -- 98 % None (Room air) -- -- -- --    07/12/24 1656 -- -- -- -- -- -- -- -- -- 8 --    07/12/24 1655 -- -- -- -- -- -- -- -- 15 -- --    07/12/24 1649 99 °F (37.2 °C) 105 22 131/96 -- 99 % None (Room air) Lying -- -- No Pain             CIWA-Ar Score       Row Name 07/14/24 0252 07/13/24 1932 07/13/24 1627       CIWA-Ar    Nausea and Vomiting 0 0 0    Tactile Disturbances 0 0 0    Tremor 0 0 0    Auditory Disturbances 0 0 0    Paroxysmal Sweats 0 0 0    Visual Disturbances 0 0 0    Anxiety 3 4 0    Headache, Fullness in Head 0 0 0    Agitation 0 0 0    Orientation and Clouding of Sensorium 0 0 0    CIWA-Ar Total 3 4 0      Row Name 07/13/24 0822 07/13/24 0407 07/13/24 0030       CIWA-Ar    Nausea and Vomiting 0 0 0    Tactile Disturbances 0 0 0    Tremor 0 0 0    Auditory Disturbances 0 0 0    Paroxysmal Sweats 0 0 2    Visual Disturbances 0 0 0    Anxiety 4 0 2    Headache, Fullness in Head 0 0 0    Agitation 0 0 0    Orientation and Clouding of Sensorium 0 0 0    CIWA-Ar Total 4 0 4      Row Name 07/12/24 2043 07/12/24 1925 07/12/24 1656       CIWA-Ar    Nausea and Vomiting 0 0 0    Tactile Disturbances 0 2 0    Tremor 0 4 4    Auditory Disturbances 0 2 0    Paroxysmal Sweats 3 0 0    Visual Disturbances 0 0 0    Anxiety 0 6 4    Headache, Fullness in Head 0 1 0    Agitation 0 4 0    Orientation and Clouding of Sensorium 0 0 0    CIWA-Ar Total 3 19 8                    Pertinent Labs/Diagnostic Test Results:   Radiology:  No orders to display     Cardiology:  ECG 12 lead   Final Result by Basilio Reyes MD (07/13 1802)   Normal sinus rhythm   Nonspecific ST abnormality   Abnormal ECG   When compared with ECG of 11-JUL-2024 14:51,   QT has shortened    Confirmed by Basilio Reyes ([id number]) on 7/13/2024 8:55:02 AM            Results from last 7 days   Lab Units 07/13/24 0426 07/12/24 1714 07/12/24 0507 07/11/24  1440   WBC Thousand/uL 3.34* 4.75 4.00* 2.84*   HEMOGLOBIN g/dL 14.1 13.7 14.4 15.4   HEMATOCRIT % 39.9 39.5 41.5 44.5   PLATELETS Thousands/uL 188 187 214 219   TOTAL NEUT ABS Thousands/µL  --  3.20 2.96 0.77*         Results from last 7 days   Lab Units 07/13/24 0426 07/12/24 1714 07/12/24 0507 07/11/24  1440   SODIUM mmol/L 137 134* 138 140   POTASSIUM mmol/L 3.7 3.1* 3.6 5.0   CHLORIDE mmol/L 105 99 101 104   CO2 mmol/L 23 25 25 26   ANION GAP mmol/L 9 10 12 10   BUN mg/dL 6 9 9 9   CREATININE mg/dL 0.73 0.83 0.84 0.90   EGFR ml/min/1.73sq m 123 117 116 113   CALCIUM mg/dL 8.2* 8.8 8.4 8.3*   MAGNESIUM mg/dL  --  2.0 2.1  --      Results from last 7 days   Lab Units 07/13/24 0426 07/12/24 1714 07/12/24 0507 07/11/24  1440   AST U/L 27 35 38 51*   ALT U/L 23 31 33 35   ALK PHOS U/L 41 43 44 45   TOTAL PROTEIN g/dL 6.2* 7.0 7.4 7.3   ALBUMIN g/dL 3.9 4.6 4.7 4.8   TOTAL BILIRUBIN mg/dL 1.38* 1.50* 1.04* 0.73   BILIRUBIN DIRECT mg/dL  --  0.30*  --   --          Results from last 7 days   Lab Units 07/13/24 0426 07/12/24 1714 07/12/24 0507 07/11/24  1440   GLUCOSE RANDOM mg/dL 106 104 104 83           Results from last 7 days   Lab Units 07/11/24  1440   LIPASE u/L 40     Results from last 7 days   Lab Units 07/12/24  1731   AMPH/METH  Negative   BARBITURATE UR  Negative   BENZODIAZEPINE UR  Positive*   COCAINE UR  Negative   METHADONE URINE  Negative   OPIATE UR  Negative   PCP UR  Negative   THC UR  Negative     Results from last 7 days   Lab Units 07/12/24  1714 07/11/24  1440   ETHANOL LVL mg/dL <10 312*     ED Treatment-Medication Administration from 07/12/2024 1643 to 07/12/2024 1925         Date/Time Order Dose Route Action     07/12/2024 1715 sodium chloride 0.9 % bolus 1,000 mL 1,000 mL Intravenous New Bag     07/12/2024 1718 diazepam  (VALIUM) injection 10 mg 10 mg Intravenous Given     07/12/2024 1818 diazepam (VALIUM) injection 10 mg 10 mg Intravenous Given          Past Medical History:   Diagnosis Date    Alcohol withdrawal seizure (HCC)     Anxiety     Depression     High triglycerides     Pancreatitis      Present on Admission:   Admitted to substance misuse detoxification center   Anxiety   Nicotine dependence due to vaping tobacco product   Hypokalemia      Admitting Diagnosis: Alcohol withdrawal (HCC) [F10.939]  Persons encountering health services in other specified circumstances [Z76.89]  Admitted to substance misuse detoxification center [Z78.9]  Age/Sex: 33 y.o. male  Admission Orders:  Regular Diet.   I&O. SCDs.  Fall & Seizure Precautions.  SEWS monitoring.  Telemetry & Continuous Pulse Ox.    Scheduled Medications:  busPIRone, 10 mg, Oral, TID  chlordiazePOXIDE, 25 mg, Oral, Q12H   Followed by  [START ON 7/15/2024] chlordiazePOXIDE, 25 mg, Oral, HS   Followed by  [START ON 7/16/2024] chlordiazePOXIDE, 10 mg, Oral, HS  folic acid, 1 mg, Oral, Daily  heparin (porcine), 5,000 Units, Subcutaneous, Q8H BRANDIN  melatonin, 3 mg, Oral, HS  multivitamin-minerals, 1 tablet, Oral, Daily  nicotine, 14 mg, Transdermal, Daily  PARoxetine, 25 mg, Oral, QAM  thiamine, 100 mg, Oral, Daily  traZODone, 50 mg, Oral, HS      Continuous IV Infusions:     PRN Meds:    Ativan 1 mg iv 7/12 x 2.     IP CONSULT TO TOXICOLOGY  IP CONSULT TO CASE MANAGEMENT    Network Utilization Review Department  ATTENTION: Please call with any questions or concerns to [phone] and carefully listen to the prompts so that you are directed to the right person. All voicemails are confidential.   For Discharge needs, contact Care Management DC Support Team at [phone] opt. 2  Send all requests for admission clinical reviews, approved or denied determinations and any other requests to dedicated fax number below belonging to the campus where the patient is receiving  treatment. List of dedicated fax numbers for the Facilities:  FACILITY NAME UR FAX NUMBER   ADMISSION DENIALS (Administrative/Medical Necessity) [phone]   DISCHARGE SUPPORT TEAM (NETWORK) [phone]   PARENT CHILD HEALTH (Maternity/NICU/Pediatrics) [phone]   General acute hospital [phone]   [facility] [phone]   [facility] [phone]   [facility] [phone]   Critical access hospital [phone]   [facility] [phone]   General acute hospital [phone]   [facility] [phone]   [facility] [phone]   [facility] [phone]   [facility] [phone]   [facility] ([place], [place]) [phone]

## 2024-07-14 NOTE — UTILIZATION REVIEW
NOTIFICATION OF INPATIENT MEDICAL ADMISSION   AUTHORIZATION REQUEST   SERVICING FACILITY:   [address] [address]  Tax ID: [id number]  NPI: [id number] ATTENDING PROVIDER:  Attending Name and NPI#: Chuck Love Do [[id number]]  Address: [address] [place] [address]  Phone: [phone]     ADMISSION INFORMATION:  Place of Service: Inpatient [facility]  Place of Service Code: 21  Inpatient Admission Date/Time: 7/12/24  6:12 PM  Discharge Date/Time: No discharge date for patient encounter.  Admitting Diagnosis Code/Description:  Alcohol withdrawal (HCC) [F10.939]  Persons encountering health services in other specified circumstances [Z76.89]  Admitted to substance misuse detoxification center [Z78.9]     UTILIZATION REVIEW CONTACT:  Soraida Flores Utilization   Network Utilization Review Department  Phone: [phone]  Fax [phone]  Email: [email or web address]  Contact for approvals/pending authorizations, clinical reviews, and discharge.     PHYSICIAN ADVISORY SERVICES:  Medical Necessity Denial & Rqiu-gs-Gnkn Review  Phone: [phone]  Fax: [phone]  Email: [email or web address]     DISCHARGE SUPPORT TEAM:  For Patients Discharge Needs & Updates  Phone: [phone] opt. 2 Fax: [phone]  Email: [email or web address]

## 2024-07-14 NOTE — DISCHARGE INSTR - OTHER ORDERS
The treatment team recommends ongoing substance use disorder recovery services such as inpatient or outpatient support. You declined referrals for both at this time. If you wish to seek this service in the future, please call your insurance carrier, [employer] [phone] for assistance in finding a provider.     You identified FARZANEH Coombs as your primary care physician. You declined a follow up appointment scheduled on your behalf. Please schedule an appointment as needed.     You requested a list of mental health therapy providers in your local area. A list was provided in your discharge packet.

## 2024-07-14 NOTE — PROGRESS NOTES
07/14/24 1012   [facility] Information   County [place]idence [place]   Patient Information   Mental Status Alert   Primary Caregiver Self   Support System Immediate family;Friends   Hinduism/Cultural Requests [employer]   Legal Information   Legal Issues denies current and hx   Activities of Daily Living Prior to Admission   Functional Status Independent   Assistive Device No device needed   Living Arrangement House;Apartment  (lives with brother in apartment; also lives with parents)   Ambulation Independent   Access to Firearms   Access to Firearms No   Income Information   Income Source Employed  (full-time; )   Means of Transportation   Means of Transport to Appts: Drives Self        07/14/24 1015   Substance Abuse Addendum Details   History of Withdrawal Symptoms Seizures;Other withdrawal symptoms (specify in comment)  (last seizure: 2/2024; hx of high anxiety, shakes, increased heart reate)   Medical Complications none identified   Sober Supports mom, dad, friend José Miguel   Present Treatment attends AA mtgs 6x/wk   Substance Abuse Treatment Hx Attends AA/NA;Past Tx, Inpatient;Past Tx, Outpatient;Past detox  (inpatient: [facility] x 3; IOP: [facility]; Detox x 1: [facility])   ASAM Level & Criteria 4.0 WM   Stage of Change   Stage of Change Contemplation     Additional Substance Use Detail    Questions Responses   Problems Due to Past Use of Alcohol? Yes   Problems Due to Past Use of Substances? Yes   Substance Use Assessment Substance use within the past 12 months   Alcohol Use Frequency Daily   Alcohol Drink of Choice vodka   1st Use of Alcohol 20 years old   Last Use of Alcohol & Amount 7/11/24; 1/3 handle   Longest Abstinence from Alcohol 5.5 months   Cocaine frequency Never used   Comment:  Never used on 7/14/2024      Pt is a 33 yr old, single male who admitted to the detox unit for alcohol withdrawal. Pt had presented to the [facility] ED requesting detoxification. Pt had  "presented to [facility] but signed self out AMA prior to detox admission day prior to this admission. Pt's name, address, date of birth and telephone number were confirmed with patient. Pt was informed of case management role and the purpose of completion of intake.      Pt states he lives with his bother in apartment in [facility] [place] [facility] during week and lives with mother, Jo and father, Gustavo, in their home \"when needed.\" Pt states trigger for relapse is increased anxiety and feelings of isolation. Pt was cooperative with no withdrawal symptoms noted. Pt endorses daily ETOH use to include 1/3 handle volda daily, last use: date of admission. age of first use: 20 yrs old with daily use starting at age 23, longest sobriety: 5.5 months. Pt admits daily tobacco use through vaping - declined cessation counseling. Pt admits previous inpt rehab services: [facility] x 3. Pt also has hx o/p services through [facility] and attends AA meetings (6 mtgs/week currently). Pt reports history of withdrawal symptoms to include high anxiety, increased heart rate and shaking. Pt admits hx withdrawal seizures (last known: 2/2022); denies black outs. Pt denies hx family substance abuse.    AUDIT: 46  PAWSS: 6  UDS: benzos (given librium in ED on 7/11 prior to AMA discharge)     Pt admits hx depression and anxiety; receives meds through PCP.  Pt denies current SI/HI/AH/VH and hx suicide attempt. Pt endorses continued depression - 7/10 and anxiety - 7/10. Pt admits hx family mental health to include mother - depression.     Pt has no chronic medical conditions. PCP is FARZANEH Padilla, Ph: [phone] - MAXIM obtained - S ok. Pt has current health insurance, [employer] [phone] and preferred pharmacy is [facility], Ph: [phone].     Pt denies current and hx legal issues. Pt denies access to firearms.    Pt reports he is currently employed FT and will need physician note to return to work. Pt denies housing " insecurity, food insecurity, domestic violence (N/A - no current relationship), daily ETOH use and no concerns related to utilities. Pt has no children. Pt completed h/s and has Bachelors degree in international business. No  hx. Pt states supports include mother, father and friend José Miguel (recovery partner). Pt has reliable transportation and drives himself to appointments/work. Pt did not sign MAXIM for mother or father.     Pt and SW completed relapse prevention plan, signed and a copy was provided to pt. Pt declined all referrals for inpatient or outpatient substance use treatment. Pt requested list of mental health therapy providers; SW provided list in discharge packet from Pt insurance carrier website. Pt presents in the contemplation stage of change.

## 2024-07-14 NOTE — DISCHARGE SUMMARY
[facility]  Discharge- Eliud Clarke 1990, 33 y.o. male MRN: [id number]  Unit/Bed#: 5T DETOX 512-01 Encounter: [id number]  Primary Care Provider: FARZANEH Padilla   Date and time admitted to hospital: 7/12/2024  4:44 PM    Discharge Diagnoses:  * Admitted to substance misuse detoxification center  Assessment & Plan  History of anxiety/depression and alcohol use presents for detox evaluation  Patient was admitted 7/11 into 7/12 at [facility] for similar but left AMA.  Patient reports consuming one third handle of vodka daily last drink prior to admission.  Was seen by toxicology on detox unit.  Doing well with chlordiazepoxide taper.  Will discharge with taper as recommended    Nicotine dependence due to vaping tobacco product  Assessment & Plan  Patient vapes.  Ordered nicotine patch during hospitalization    Hypokalemia  Assessment & Plan  Hyponatremia and hypokalemia: Was on D5 NS + KCl.  Electrolytes within limits at time of discharge    Results from last 7 days   Lab Units 07/14/24 0305 07/13/24 0426 07/12/24 1714 07/12/24  0507   SODIUM mmol/L 138 137 134* 138   POTASSIUM mmol/L 3.6 3.7 3.1* 3.6         Anxiety  Assessment & Plan  Mood stable.  Continue paroxetine 25 mg and trazodone 50 mg at bedtime      Medical Problems       Resolved Problems  Date Reviewed: 7/14/2024   None        Discharging Physician / Practitioner: Chuck Love DO  PCP: FARZANEH Padilla  Admission Date:   Admission Orders (From admission, onward)       Ordered        07/12/24 1812  INPATIENT ADMISSION  Once                        Discharge Date: 07/14/24    Consultations During Hospital Stay:  IP CONSULT TO TOXICOLOGY  IP CONSULT TO CASE MANAGEMENT     Procedures Performed:   * No surgery found *     Images:   No results found.    Lab Results:   Results from last 7 days   Lab Units 07/14/24 0305 07/13/24 0426 07/12/24 1714 07/12/24  0507  07/11/24  1440   WBC Thousand/uL 4.59 3.34* 4.75 4.00* 2.84*   HEMOGLOBIN g/dL 15.3 14.1 13.7 14.4 15.4   HEMATOCRIT % 45.0 39.9 39.5 41.5 44.5   MCV fL 93 90 92 90 90   PLATELETS Thousands/uL 169 188 187 214 219     Results from last 7 days   Lab Units 07/14/24  0305 07/13/24  0426 07/12/24  1714 07/12/24  0507 07/11/24  1440   SODIUM mmol/L 138 137 134* 138 140   POTASSIUM mmol/L 3.6 3.7 3.1* 3.6 5.0   CHLORIDE mmol/L 102 105 99 101 104   CO2 mmol/L 26 23 25 25 26   BUN mg/dL 6 6 9 9 9   CREATININE mg/dL 0.85 0.73 0.83 0.84 0.90   CALCIUM mg/dL 9.2 8.2* 8.8 8.4 8.3*   ALBUMIN g/dL 4.6 3.9 4.6 4.7 4.8   TOTAL BILIRUBIN mg/dL 1.16* 1.38* 1.50* 1.04* 0.73   ALK PHOS U/L 46 41 43 44 45   ALT U/L 31 23 31 33 35   AST U/L 34 27 35 38 51*   EGFR ml/min/1.73sq m 116 123 117 116 113   GLUCOSE RANDOM mg/dL 100 106 104 104 83         Incidental Findings:      Test Results Pending at Discharge (will require follow up):      Reason for Admission:   Detox Evaluation (Pt rpts 1/3 handle of vodka daily for 10-12days. Last drink 7/11 0830. Pt requesting detox, HX withdrawal seizures. )    Hospital Course:   Eliud Clarke is a 33 y.o. male patient who originally presented to the [facility] for alcohol detox.  He was admitted there but left AMA and shortly came here to seek detox help.  Unfortunately there was no detox provider for admission and he was admitted to medical service.  He was started on chlordiazepoxide and did quite well.  He was seen by  who recommended a chlordiazepoxide taper.  Electrolytes have been replaced.  He has been seen by case management and will be given proper resources at time of discharge.      Please see above list of diagnoses and related plan for additional information.     Condition at Discharge: stable     Discharge Day Visit / Exam:   Subjective: Patient seen and examined.  Still feeling good.  No further withdrawal symptoms.  Asking about discharge.    Vitals: Blood  "Pressure: 138/84 (07/14/24 0252)  Pulse: 89 (07/14/24 0252)  Temperature: (!) 97.1 °F (36.2 °C) (07/14/24 0252)  Temp Source: Temporal (07/14/24 0252)  Respirations: 16 (07/14/24 0252)  Height: 5' 11\" (180.3 cm) (07/12/24 1925)  Weight - Scale: 90.7 kg (200 lb) (07/12/24 1925)  SpO2: 95 % (07/14/24 0252)    Exam:   Physical Exam  Vitals reviewed.   Constitutional:       General: He is not in acute distress.  HENT:      Head: Atraumatic.   Cardiovascular:      Rate and Rhythm: Regular rhythm.      Heart sounds: Normal heart sounds.   Pulmonary:      Effort: Pulmonary effort is normal.      Breath sounds: No wheezing.   Abdominal:      General: Bowel sounds are normal.      Palpations: Abdomen is soft.      Tenderness: There is no abdominal tenderness. There is no rebound.   Musculoskeletal:         General: No swelling.   Skin:     General: Skin is warm.   Neurological:      General: No focal deficit present.      Mental Status: He is alert.   Psychiatric:         Mood and Affect: Mood normal.       Discussion with Family: Discussed with parents on telephone during hospitalization    Discharge instructions/Information to patient and family:   See after visit summary for information provided to patient and family.      Provisions for Follow-Up Care:  See after visit summary for information related to follow-up care and any pertinent home health orders.      Mobility at time of Discharge:  Basic Mobility Inpatient Raw Score: 24  JH-HLM Goal: 8: Walk 250 feet or more  JH-HLM Achieved: 8: Walk 250 feet ot more  JH-HLM Goal achieved. Continue to encourage appropriate mobility.    Disposition:   Home    Planned Readmission: No     Discharge Statement:  I spent 35 minutes discharging the patient. This time was spent on the day of discharge. I had direct contact with the patient on the day of discharge. Greater than 50% of the total time was spent examining patient, answering all patient questions, arranging and discussing " plan of care with patient as well as directly providing post-discharge instructions.  Additional time then spent on discharge activities.    Discharge Medications:  See after visit summary for reconciled discharge medications provided to patient and family.      ** Please Note: This note has been constructed using a voice recognition system **

## 2024-07-14 NOTE — CASE MANAGEMENT
"   Case Management Discharge Planning Note    Patient name Eliud Clarke  Location 5T DETOX 512/5T DETOX 51* MRN [id number]  : 1990 Date 2024       Current Admission Date: 2024  Current Admission Diagnosis:Admitted to substance misuse detoxification center   Patient Active Problem List    Diagnosis Date Noted Date Diagnosed    Admitted to substance misuse detoxification center 2024     Psychophysiological insomnia 2024     Nicotine dependence due to vaping tobacco product 2023     Hypokalemia 10/17/2021     Alcohol use disorder, severe, dependence (HCC) 10/16/2021     Alcohol withdrawal syndrome without complication (HCC) 10/16/2021     COVID-19 10/16/2021     Anxiety 10/16/2021       LOS (days): 2  Geometric Mean LOS (GMLOS) (days):   Days to GMLOS:     OBJECTIVE:  Risk of Unplanned Readmission Score: 20.64         Current admission status: Inpatient   Preferred Pharmacy:   [facility]/pharmacy #[id number] [place] [address]  Phone: [phone] Fax: [phone]    Primary Care Provider: FARZANEH Padilla    Primary Insurance: [employer]  Secondary Insurance:     DISCHARGE DETAILS:  Pt is being discharged to his parent's home located at [address] [place] [address]. Phone: [phone]. Pt parents will provide transportation around 12pm today. Pt declined contact with parents advising he will \"update them\" as necessary. No MAXIM on chart. Pt's pharmacy preference is listed above.                                                                                                                       "

## 2024-07-14 NOTE — PROGRESS NOTES
07/14/24 1003   Discharge Planning   Living Arrangements Lives w/ Family members;Lives w/ Parent(s)  (has apartment with brother and lives on/off with parents)   Support Systems Self   Type of Current Residence Private residence   Current Home Care Services No   Other Referral/Resources/Interventions Provided:   Referrals Provided: Crisis Hotline;IOP;Other (Specify);Peer Specialist;Support Group;Therapist   Discharge Communications   Discharge planning discussed with: Eliud   Family notified: pt declined notification   Transportation at Discharge? Yes  (family)   ETA of Transport 12pm   [facility]   Would you like to participate in our [facility] service program?   No - Declined

## 2024-07-14 NOTE — PROGRESS NOTES
"Discussed with patient: AUDIT score of 46  UDS/Identified Substance(s) used: ETOH   Risks discussed included: physical and mental health risks  Recommendations discussed: IOP and inpatient recovery services  Patient's response: Pt declined stating, \"Brianna done that before. I really just need a therapist (mental health) to talk to.\"  "

## 2024-07-14 NOTE — NURSING NOTE
Patient discharged. Belongings accounted for. AVS reviewed with patient, questions answered and understanding stated. Patient ambulated to lobby with RN.

## 2024-07-15 DIAGNOSIS — F10.10 ALCOHOL ABUSE: Primary | ICD-10-CM

## 2024-07-15 NOTE — UTILIZATION REVIEW
NOTIFICATION OF ADMISSION DISCHARGE   This is a Notification of Discharge from [facility]. Please be advised that this patient has been discharge from our facility. Below you will find the admission and discharge date and time including the patient’s disposition.   UTILIZATION REVIEW CONTACT:  Soraida Flores MA  Utilization   Network Utilization Review Department  Phone: [phone] x carefully listen to the prompts. All voicemails are confidential.  Email: [email or web address]     ADMISSION INFORMATION  PRESENTATION DATE: 7/11/2024  2:11 PM  OBERVATION ADMISSION DATE: N/A  INPATIENT ADMISSION DATE: 7/11/24  3:12 PM   DISCHARGE DATE: 7/12/2024  4:36 PM   DISPOSITION:Home/Self Care    Network Utilization Review Department  ATTENTION: Please call with any questions or concerns to [phone] and carefully listen to the prompts so that you are directed to the right person. All voicemails are confidential.   For Discharge needs, contact Care Management DC Support Team at [phone] opt. 2  Send all requests for admission clinical reviews, approved or denied determinations and any other requests to dedicated fax number below belonging to the campus where the patient is receiving treatment. List of dedicated fax numbers for the Facilities:  FACILITY NAME UR FAX NUMBER   ADMISSION DENIALS (Administrative/Medical Necessity) [phone]   DISCHARGE SUPPORT TEAM ([facility]) [phone]   PARENT CHILD HEALTH (Maternity/NICU/Pediatrics) [phone]   [facility] [phone]   [facility] [phone]   [facility] [phone]   [facility] [phone]   [facility] [phone]   [facility] [phone]   [facility] [phone]   [facility]  [phone]   [facility] [phone]   [facility] [phone]   [facility] [phone]   [facility] [phone]

## 2024-07-16 ENCOUNTER — PATIENT OUTREACH (OUTPATIENT)
Dept: FAMILY MEDICINE CLINIC | Facility: CLINIC | Age: 34
End: 2024-07-16

## 2024-07-16 ENCOUNTER — OFFICE VISIT (OUTPATIENT)
Dept: FAMILY MEDICINE CLINIC | Facility: CLINIC | Age: 34
End: 2024-07-16
Payer: COMMERCIAL

## 2024-07-16 VITALS
HEART RATE: 60 BPM | SYSTOLIC BLOOD PRESSURE: 110 MMHG | WEIGHT: 196.6 LBS | DIASTOLIC BLOOD PRESSURE: 70 MMHG | HEIGHT: 71 IN | OXYGEN SATURATION: 92 % | BODY MASS INDEX: 27.52 KG/M2

## 2024-07-16 DIAGNOSIS — F10.930 ALCOHOL WITHDRAWAL SYNDROME WITHOUT COMPLICATION (HCC): ICD-10-CM

## 2024-07-16 DIAGNOSIS — F51.04 PSYCHOPHYSIOLOGICAL INSOMNIA: Primary | ICD-10-CM

## 2024-07-16 DIAGNOSIS — F17.290 NICOTINE DEPENDENCE DUE TO VAPING TOBACCO PRODUCT: ICD-10-CM

## 2024-07-16 DIAGNOSIS — F10.20 ALCOHOL USE DISORDER, SEVERE, DEPENDENCE (HCC): ICD-10-CM

## 2024-07-16 DIAGNOSIS — Z09 HOSPITAL DISCHARGE FOLLOW-UP: ICD-10-CM

## 2024-07-16 DIAGNOSIS — F41.9 ANXIETY: ICD-10-CM

## 2024-07-16 PROBLEM — U07.1 COVID-19: Status: RESOLVED | Noted: 2021-10-16 | Resolved: 2024-07-16

## 2024-07-16 PROBLEM — Z78.9 ADMITTED TO SUBSTANCE MISUSE DETOXIFICATION CENTER: Status: RESOLVED | Noted: 2024-07-12 | Resolved: 2024-07-16

## 2024-07-16 PROBLEM — E87.6 HYPOKALEMIA: Status: RESOLVED | Noted: 2021-10-17 | Resolved: 2024-07-16

## 2024-07-16 PROCEDURE — 99496 TRANSJ CARE MGMT HIGH F2F 7D: CPT | Performed by: NURSE PRACTITIONER

## 2024-07-16 RX ORDER — CLONAZEPAM 0.5 MG/1
0.5 TABLET ORAL 2 TIMES DAILY PRN
Qty: 40 TABLET | Refills: 1 | Status: SHIPPED | OUTPATIENT
Start: 2024-07-16

## 2024-07-16 NOTE — LETTER
July 16, 2024     Patient: Eliud Clarke  YOB: 1990  Date of Visit: 7/16/2024      To Whom it May Concern:    Eliud Clarke is under my professional care. Eliud was seen in my office on 7/16/2024 for a follow-up.  Due to the nature of his recent medical issues, please allow him to remain off of work from 07/16/2024 to 07/19/2024 with a pending return date of 07/22/2024.     If you have any questions or concerns, please don't hesitate to call.         Sincerely,          FARZANEH Padilla

## 2024-07-16 NOTE — LETTER
July 16, 2024     Eliud LUDYJefry Clarke    Patient: Eliud Clarke   YOB: 1990   Date of Visit: 7/16/2024     Amber Kline,     I am the  that covers [facility].  I saw you are interested in having an outpatient mental health therapist.  I wanted to provide you some information for providers in your area.  You can also call [facility] and have them email you a list of in -network providers as well.       [facility] COMMERCIAL PROVIDERS    Anne Hernandez  [address] [id number] [phone]    [place] [address]  Call Lorena Johnston  [phone]    [facility]  [address] [place] [address] [phone]    [facility]  [address] [address]     [facility]  [address] [place] [address] [phone]    [facility]  [address] [place] [address] [phone]    [facility]  [address] [place] [address]  Call Anne German  [phone]    [facility]  [address] [place] [address] [phone]              [facility] Mental Health Resourcesfor Medicaid:      [facility] Crisis Line: [phone]      Service Access Management - offers case management   [phone]   [facility] [place] [address]      Outpatient Mental Health - psychiatry and therapy     [facility]   [phone]   [address] [place] [address]     [facility]   [phone]   [address] [place] [address]     [facility]  [address] [place], [place] [address] [phone]    [facility], [facility]  [address] [place] [address]  Call Lilian Ayala  [phone]    [facility]    [phone]   [place] [address]     [facility]   [phone]        [facility] SUBSTANCE ABUSE  COGI [address] [place] [address]- DOES SUBOXONE  [phone] or [phone]    Fax Number: [phone]    Daxa Pereira  [phone]

## 2024-07-16 NOTE — ASSESSMENT & PLAN NOTE
Currently sober.    Has finished with the librium and will change him to Klonopin BID PRN.      Due to recent detox, he will remain off of work until Monday 7/22.

## 2024-07-16 NOTE — PROGRESS NOTES
OP CM vd inbasket in regards to pt for HRR.  Pt was just dc from detox unit.  Pt drives his own vehicle and works as an .  Pt was discharged to his parents house in [facility].  Pt also has an apartment with his brother so he goes back and forth between the two.  OP D&A resources and crisis info were discussed with pt but he states he is not interested and has done this before.  Pt is interested in OP MH.  [employer] message and email sent with a list of OP MH providers.      [facility] COMMERCIAL PROVIDERS    Anne Hernandez  [address] [id number] [phone]    [place] [address]  Call Lorena Johnston  [phone]    [facility]  [address] [place] [address] [phone]    [facility]  [address] [address]    [facility]  [address] [place] [address] [phone]    [facility]  [address] [place] [address] [phone]    [facility]  [address] [place] [address]  Call Anne German  [phone]    [facility]  [address] [place] [address] [phone]              [facility] Mental Health Resourcesfor Medicaid:      [facility] Crisis Line: [phone]      Service Access Management - offers case management   [phone]   [facility] [place] [address]      Outpatient Mental Health - psychiatry and therapy     [facility]   [phone]   [address] [place] [address]     [facility]   [phone]   [address] [place] [address]     [facility]  [address]  [place], [place] [address] [phone]    [facility], [facility]  [address] [place] [address]  Call Lilian Ayala  [phone]    [facility]    [phone]   [place] [address]     [facility]   [phone]    [facility] SUBSTANCE ABUSE  COGI [address] [place] [address]- DOES SUBOXONE  [phone] or [phone]    Fax Number: [phone]

## 2024-07-16 NOTE — PROGRESS NOTES
TCM Call       Date and time call was made  7/15/2024  9:13 AM    Patient was hospitialized at  [facility]    Date of Admission  24    Date of discharge  24    Diagnosis  intoxication    Disposition  Home    Were the patients medications reviewed and updated  No    Current Symptoms  None          TCM Call       Should patient be enrolled in anticoag monitoring?  No    Scheduled for follow up?  Yes    Did you obtain your prescribed medications  Yes    Do you need help managing your prescriptions or medications  No    Is transportation to your appointment needed  No    I have advised the patient to call PCP with any new or worsening symptoms  Shanita Cloud MA    Are you recieving any outpatient services  No    Are you recieving home care services  No    Are you using any community resources  No    Current waiver services  No    Have you fallen in the last 12 months  No    Interperter language line needed  No    Counseling  Patient             Ambulatory Visit  Name: Eliud Clarke      : 1990      MRN: [id number]  Encounter Provider: FARZANEH Padilla  Encounter Date: 2024   Encounter department: [facility] PRIMARY CARE    Assessment & Plan   1. Psychophysiological insomnia  -     clonazePAM (KlonoPIN) 0.5 mg tablet; Take 1 tablet (0.5 mg total) by mouth 2 (two) times a day as needed for seizures  2. Anxiety  -     clonazePAM (KlonoPIN) 0.5 mg tablet; Take 1 tablet (0.5 mg total) by mouth 2 (two) times a day as needed for seizures  3. Alcohol withdrawal syndrome without complication (HCC)  -     clonazePAM (KlonoPIN) 0.5 mg tablet; Take 1 tablet (0.5 mg total) by mouth 2 (two) times a day as needed for seizures  4. Alcohol use disorder, severe, dependence (HCC)  5. Nicotine dependence due to vaping tobacco product  6. Hospital discharge follow-up       History of Present Illness     Here for a hospital discharge.  Recent admission due to alcohol  "intoxication.  Left the hospital after 24 hours and checked into detox.  Is now sober again.  ON a librium taper but is very tired on it.  Anxiety is high right now-  he does see a psychiatrist but reports that he is looking to switch his.          Review of Systems    Objective     /70 (BP Location: Left arm, Patient Position: Sitting, Cuff Size: Standard)   Pulse 60   Ht 5' 11\" (1.803 m)   Wt 89.2 kg (196 lb 9.6 oz)   SpO2 92%   BMI 27.42 kg/m²     Physical Exam  Constitutional:       Appearance: Normal appearance.      Comments: Feels tired right now from the librium   HENT:      Head: Normocephalic and atraumatic.   Cardiovascular:      Pulses:           Dorsalis pedis pulses are 2+ on the right side and 2+ on the left side.   Pulmonary:      Effort: Pulmonary effort is normal.   Musculoskeletal:      Cervical back: Neck supple.   Feet:      Right foot:      Skin integrity: No ulcer, skin breakdown, erythema, warmth, callus or dry skin.      Left foot:      Skin integrity: No ulcer, skin breakdown, erythema, warmth, callus or dry skin.   Neurological:      General: No focal deficit present.      Mental Status: He is alert and oriented to person, place, and time. Mental status is at baseline.   Psychiatric:         Mood and Affect: Mood normal.         Behavior: Behavior normal.         Thought Content: Thought content normal.         Judgment: Judgment normal.       Administrative Statements     "

## 2024-07-17 ENCOUNTER — PATIENT OUTREACH (OUTPATIENT)
Dept: FAMILY MEDICINE CLINIC | Facility: CLINIC | Age: 34
End: 2024-07-17

## 2024-07-17 NOTE — LETTER
July 17, 2024     Eliud Clarke    Patient: Eliud Clarke   YOB: 1990   Date of Visit: 7/17/2024       Amber Kline,     I am the  that is covering [facility].  I wanted to provide you a list of outpatient mental health providers for therapy in your area.  You can also call [facility] and request they email you their latest list of in network providers.      [facility],     Daxa   [phone]        CC: No Recipients

## 2024-07-17 NOTE — PROGRESS NOTES
OP CM called to pt again in regards to referral for OP MH.  Pt did not answer so LM and also sent [employer] message with a list of providers.

## 2024-07-18 NOTE — UTILIZATION REVIEW
Maria Luisa Cameron, RN   Registered Nurse  Specialty: Utilization Review     Utilization Review     Signed     Date of Service: 7/14/2024  9:09 AM     Signed       Expand All Collapse All    Initial Clinical Review        Admission: Date/Time/Statement:   Admission Orders (From admission, onward)          Ordered         07/12/24 1812   INPATIENT ADMISSION  Once                                     Orders Placed This Encounter   Procedures    INPATIENT ADMISSION       Standing Status:   Standing       Number of Occurrences:   1       Order Specific Question:   Level of Care       Answer:   Med Surg [16]       Order Specific Question:   Estimated length of stay       Answer:   More than 2 Midnights       Order Specific Question:   Certification       Answer:   I certify that inpatient services are medically necessary for this patient for a duration of greater than two midnights. See H&P and MD Progress Notes for additional information about the patient's course of treatment.      ED Arrival Information         Expected   -    Arrival   7/12/2024 16:43    Acuity   Urgent                 Means of arrival   Walk-In    Escorted by   Self    Service   Hospitalist    Admission type   Emergency                 Arrival complaint   detox                     Chief Complaint   Patient presents with    Detox Evaluation       Pt rpts 1/3 handle of vodka daily for 10-12days. Last drink 7/11 0830. Pt requesting detox, HX withdrawal seizures.          Initial Presentation: 33 y.o. male who presented to ED after leaving AMA earlier in the day from another facility. . Inpatient admission for evaluation and treatment of alcohol withdrawal syndrome. Presented w/ need for detox from alcohol. Serum ETOH: 312. Reports 1/3 handle daily, last drink on 7/11 @ 0830. Has prior rehab treatment for withdrawal. Reports hx of withdrawal seizures. On exam, nervous, anxious. CIWA 19 Plan: SEWS monitoring w/ phenobarbital management, PO  thiamine/folic acid supplement, IVF, telemetry, continuous pulse ox, continue PTA meds, trend labs, replete electrolytes as needed.      Anticipated Length of Stay:  Patient will be admitted on an Inpatient basis with an anticipated length of stay of  >2  midnights.   This patient qualifies for Level IV medically managed intensive inpatient services under the criteria set by the American Society of Addiction Medicine, including dimensions 1-3. The patient is in withdrawal (or is intoxicated with high risk of withdrawal), with severe and unstable medical and/or psychiatric (dual diagnosis) problems, requiring requires 24-hour medical and nursing care and the full resources of a [facility] hospital.       Date: 7/13       Day 2: Pt reports headache, tremor. On exam, nausea, vomiting, tachycardic, flushed . Plan: continue SEWS monitoring w/ phenobarbital management, PO thiamine/folic acid supplement, telemetry, continuous pulse ox, continue current meds, trend labs, replete electrolytes as needed.              ED Triage Vitals [07/12/24 1649]   Temperature Pulse Respirations Blood Pressure SpO2 Pain Score   99 °F (37.2 °C) 105 22 131/96 99 % No Pain      Weight (last 2 days)         Date/Time Weight     07/12/24 1925 90.7 (200)     07/12/24 1649 92 (202.8)                Vital Signs (last 3 days)         Date/Time Temp Pulse Resp BP MAP (mmHg) SpO2 O2 Device Patient Position - Orthostatic VS Betsy Coma Scale Score CIWA-Ar Total Pain     07/14/24 0757 -- -- -- -- -- -- -- -- -- -- No Pain     07/14/24 0252 97.1 °F (36.2 °C) 89 16 138/84 102 95 % None (Room air) Sitting -- 3 --     07/13/24 1932 96.9 °F (36.1 °C) 81 16 121/86 97 96 % None (Room air) Sitting 15 4 No Pain     07/13/24 1627 96.7 °F (35.9 °C) 72 16 128/88 -- 95 % None (Room air) Lying -- 0 --     07/13/24 1204 97.4 °F (36.3 °C) 71 16 122/80 -- 97 % None (Room air) -- -- -- --     07/13/24 0900 -- -- -- -- -- -- -- -- 15 -- No Pain     07/13/24 0822 97 °F  (36.1 °C) 88 16 131/87 -- 96 % None (Room air) Sitting -- 4 --     07/13/24 0407 97.1 °F (36.2 °C) 66 18 119/80 93 97 % None (Room air) Lying -- 0 --     07/13/24 0030 97.2 °F (36.2 °C) 64 18 106/64 78 96 % None (Room air) Lying -- 4 --     07/12/24 2043 97 °F (36.1 °C) 72 18 103/72 82 96 % None (Room air) Lying -- 3 --     07/12/24 1932 -- -- -- -- -- -- -- -- 15 -- 2     07/12/24 1925 97.5 °F (36.4 °C) 83 18 157/100 119 96 % None (Room air) Sitting -- 19 --     07/12/24 1915 -- 100 20 134/89 -- -- -- Sitting -- -- --     07/12/24 1836 -- 81 20 125/56 -- 98 % None (Room air) -- -- -- --     07/12/24 1656 -- -- -- -- -- -- -- -- -- 8 --     07/12/24 1655 -- -- -- -- -- -- -- -- 15 -- --     07/12/24 1649 99 °F (37.2 °C) 105 22 131/96 -- 99 % None (Room air) Lying -- -- No Pain                  CIWA-Ar Score         Row Name 07/14/24 0252 07/13/24 1932 07/13/24 1627             CIWA-Ar     Nausea and Vomiting 0 0 0     Tactile Disturbances 0 0 0     Tremor 0 0 0     Auditory Disturbances 0 0 0     Paroxysmal Sweats 0 0 0     Visual Disturbances 0 0 0     Anxiety 3 4 0     Headache, Fullness in Head 0 0 0     Agitation 0 0 0     Orientation and Clouding of Sensorium 0 0 0     CIWA-Ar Total 3 4 0        Row Name 07/13/24 0822 07/13/24 0407 07/13/24 0030             CIWA-Ar     Nausea and Vomiting 0 0 0     Tactile Disturbances 0 0 0     Tremor 0 0 0     Auditory Disturbances 0 0 0     Paroxysmal Sweats 0 0 2     Visual Disturbances 0 0 0     Anxiety 4 0 2     Headache, Fullness in Head 0 0 0     Agitation 0 0 0     Orientation and Clouding of Sensorium 0 0 0     CIWA-Ar Total 4 0 4        Row Name 07/12/24 2043 07/12/24 1925 07/12/24 1656             CIWA-Ar     Nausea and Vomiting 0 0 0     Tactile Disturbances 0 2 0     Tremor 0 4 4     Auditory Disturbances 0 2 0     Paroxysmal Sweats 3 0 0     Visual Disturbances 0 0 0     Anxiety 0 6 4     Headache, Fullness in Head 0 1 0     Agitation 0 4 0     Orientation and  Clouding of Sensorium 0 0 0     CIWA-Ar Total 3 19 8                          Pertinent Labs/Diagnostic Test Results:   Radiology:  No orders to display      Cardiology:  ECG 12 lead   Final Result by Basilio Reyes MD (07/13 0855)   Normal sinus rhythm   Nonspecific ST abnormality   Abnormal ECG   When compared with ECG of 11-JUL-2024 14:51,   QT has shortened   Confirmed by Basilio Reyes ([id number]) on 7/13/2024 8:55:02 AM                        Results from last 7 days   Lab Units 07/13/24 0426 07/12/24 1714 07/12/24 0507 07/11/24  1440   WBC Thousand/uL 3.34* 4.75 4.00* 2.84*   HEMOGLOBIN g/dL 14.1 13.7 14.4 15.4   HEMATOCRIT % 39.9 39.5 41.5 44.5   PLATELETS Thousands/uL 188 187 214 219   TOTAL NEUT ABS Thousands/µL  --  3.20 2.96 0.77*                  Results from last 7 days   Lab Units 07/13/24 0426 07/12/24 1714 07/12/24 0507 07/11/24  1440   SODIUM mmol/L 137 134* 138 140   POTASSIUM mmol/L 3.7 3.1* 3.6 5.0   CHLORIDE mmol/L 105 99 101 104   CO2 mmol/L 23 25 25 26   ANION GAP mmol/L 9 10 12 10   BUN mg/dL 6 9 9 9   CREATININE mg/dL 0.73 0.83 0.84 0.90   EGFR ml/min/1.73sq m 123 117 116 113   CALCIUM mg/dL 8.2* 8.8 8.4 8.3*   MAGNESIUM mg/dL  --  2.0 2.1  --               Results from last 7 days   Lab Units 07/13/24 0426 07/12/24 1714 07/12/24  0507 07/11/24  1440   AST U/L 27 35 38 51*   ALT U/L 23 31 33 35   ALK PHOS U/L 41 43 44 45   TOTAL PROTEIN g/dL 6.2* 7.0 7.4 7.3   ALBUMIN g/dL 3.9 4.6 4.7 4.8   TOTAL BILIRUBIN mg/dL 1.38* 1.50* 1.04* 0.73   BILIRUBIN DIRECT mg/dL  --  0.30*  --   --                   Results from last 7 days   Lab Units 07/13/24 0426 07/12/24  1714 07/12/24  0507 07/11/24  1440   GLUCOSE RANDOM mg/dL 106 104 104 83                  Results from last 7 days   Lab Units 07/11/24  1440   LIPASE u/L 40           Results from last 7 days   Lab Units 07/12/24  1731   AMPH/METH   Negative   BARBITURATE UR   Negative   BENZODIAZEPINE UR   Positive*   COCAINE UR   Negative    METHADONE URINE   Negative   OPIATE UR   Negative   PCP UR   Negative   THC UR   Negative            Results from last 7 days   Lab Units 07/12/24  1714 07/11/24  1440   ETHANOL LVL mg/dL <10 312*      ED Treatment-Medication Administration from 07/12/2024 1643 to 07/12/2024 1925           Date/Time Order Dose Route Action       07/12/2024 1715 sodium chloride 0.9 % bolus 1,000 mL 1,000 mL Intravenous New Bag       07/12/2024 1718 diazepam (VALIUM) injection 10 mg 10 mg Intravenous Given       07/12/2024 1818 diazepam (VALIUM) injection 10 mg 10 mg Intravenous Given             Medical History        Past Medical History:   Diagnosis Date    Alcohol withdrawal seizure (HCC)      Anxiety      Depression      High triglycerides      Pancreatitis           Present on Admission:   Admitted to substance misuse detoxification center   Anxiety   Nicotine dependence due to vaping tobacco product   Hypokalemia        Admitting Diagnosis: Alcohol withdrawal (HCC) [F10.939]  Persons encountering health services in other specified circumstances [Z76.89]  Admitted to substance misuse detoxification center [Z78.9]  Age/Sex: 33 y.o. male  Admission Orders:  Regular Diet.     I&O.SCDs.  Fall & Seizure Precautions.  SEWS monitoring.  Telemetry & Continuous Pulse Ox.     Scheduled Medications:    Scheduled Medications ONLY (does not pull in infusions nor PRN medications order   busPIRone, 10 mg, Oral, TID  chlordiazePOXIDE, 25 mg, Oral, Q12H   Followed by  [START ON 7/15/2024] chlordiazePOXIDE, 25 mg, Oral, HS   Followed by  [START ON 7/16/2024] chlordiazePOXIDE, 10 mg, Oral, HS  folic acid, 1 mg, Oral, Daily  heparin (porcine), 5,000 Units, Subcutaneous, Q8H BRANDIN  melatonin, 3 mg, Oral, HS  multivitamin-minerals, 1 tablet, Oral, Daily  nicotine, 14 mg, Transdermal, Daily  PARoxetine, 25 mg, Oral, QAM  thiamine, 100 mg, Oral, Daily  traZODone, 50 mg, Oral, HS         Continuous IV Infusions:  Infusions Meds - Displays dose, route, &  frequency only         PRN Meds:     Ativan 1 mg iv 7/12 x 2.      IP CONSULT TO TOXICOLOGY  IP CONSULT TO CASE MANAGEMENT     Network Utilization Review Department  ATTENTION: Please call with any questions or concerns to [phone] and carefully listen to the prompts so that you are directed to the right person. All voicemails are confidential.   For Discharge needs, contact Care Management DC Support Team at [phone] opt. 2  Send all requests for admission clinical reviews, approved or denied determinations and any other requests to dedicated fax number below belonging to the Drain where the patient is receiving treatment. List of dedicated fax numbers for the Facilities:  FACILITY NAME UR FAX NUMBER   ADMISSION DENIALS (Administrative/Medical Necessity) [phone]   DISCHARGE SUPPORT TEAM (NETWORK) [phone]   PARENT CHILD HEALTH (Maternity/NICU/Pediatrics) [phone]   [facility] [phone]   [facility] [phone]   [facility] [phone]   [facility] [phone]   [facility] [phone]   [facility] [phone]   [facility] [phone]   [facility] [phone]   [facility] [phone]   Critical access hospital [phone]   [facility] [phone]   [facility] [phone]

## 2024-07-22 NOTE — UTILIZATION REVIEW
NOTIFICATION OF ADMISSION DISCHARGE   This is a Notification of Discharge from [facility]. Please be advised that this patient has been discharge from our facility. Below you will find the admission and discharge date and time including the patient’s disposition.   UTILIZATION REVIEW CONTACT:  Soraida Flores MA  Utilization   Network Utilization Review Department  Phone: [phone] x carefully listen to the prompts. All voicemails are confidential.  Email: [email or web address]     ADMISSION INFORMATION  PRESENTATION DATE: 7/12/2024  4:44 PM  OBERVATION ADMISSION DATE: N/A  INPATIENT ADMISSION DATE: 7/12/24  6:12 PM   DISCHARGE DATE: 7/14/2024 12:29 PM   DISPOSITION:Home/Self Care    Network Utilization Review Department  ATTENTION: Please call with any questions or concerns to [phone] and carefully listen to the prompts so that you are directed to the right person. All voicemails are confidential.   For Discharge needs, contact Care Management DC Support Team at [phone] opt. 2  Send all requests for admission clinical reviews, approved or denied determinations and any other requests to dedicated fax number below belonging to the campus where the patient is receiving treatment. List of dedicated fax numbers for the Facilities:  FACILITY NAME UR FAX NUMBER   ADMISSION DENIALS (Administrative/Medical Necessity) [phone]   DISCHARGE SUPPORT TEAM ([facility]) [phone]   PARENT CHILD HEALTH (Maternity/NICU/Pediatrics) [phone]   [facility] [phone]   [facility] [phone]   [facility] [phone]   [facility] [phone]   [facility] [phone]   [facility] [phone]   [facility] [phone]   [facility]  [phone]   [facility] [phone]   [facility] [phone]   [facility] [phone]   [facility] [phone]

## 2024-07-23 ENCOUNTER — PATIENT MESSAGE (OUTPATIENT)
Dept: FAMILY MEDICINE CLINIC | Facility: CLINIC | Age: 34
End: 2024-07-23

## 2024-07-30 NOTE — PATIENT COMMUNICATION
Patient called In regards to his short term disability forms being filled out. Patient would like to know if provider had a chance to review them.

## 2024-07-30 NOTE — TELEPHONE ENCOUNTER
I completed the forms last week and I gave them to one of the MA's (deepthi) to fax and scan into his chart.  I do not see it scanned into his chart though.

## 2024-08-13 NOTE — PATIENT COMMUNICATION
Irasema calero[employer] [facility] [employer] called regarding pt's disability forms.     While attempting a warm transfer to clerical Irasema disconnected. Unable to callback as she was calling from a call center.    Spoke with Trina in clerical who confirmed that she is actively working this, that the forms are completed and are just awaiting the providers signature. Once signed she will be faxing back to [facility].

## 2024-08-16 ENCOUNTER — OFFICE VISIT (OUTPATIENT)
Dept: FAMILY MEDICINE CLINIC | Facility: CLINIC | Age: 34
End: 2024-08-16
Payer: COMMERCIAL

## 2024-08-16 VITALS
SYSTOLIC BLOOD PRESSURE: 110 MMHG | DIASTOLIC BLOOD PRESSURE: 72 MMHG | HEIGHT: 71 IN | HEART RATE: 78 BPM | BODY MASS INDEX: 27.16 KG/M2 | WEIGHT: 194 LBS | OXYGEN SATURATION: 99 %

## 2024-08-16 DIAGNOSIS — F51.04 PSYCHOPHYSIOLOGICAL INSOMNIA: ICD-10-CM

## 2024-08-16 DIAGNOSIS — F17.290 NICOTINE DEPENDENCE DUE TO VAPING TOBACCO PRODUCT: ICD-10-CM

## 2024-08-16 DIAGNOSIS — F41.9 ANXIETY: Primary | ICD-10-CM

## 2024-08-16 DIAGNOSIS — F10.20 ALCOHOL USE DISORDER, SEVERE, DEPENDENCE (HCC): ICD-10-CM

## 2024-08-16 DIAGNOSIS — F10.930 ALCOHOL WITHDRAWAL SYNDROME WITHOUT COMPLICATION (HCC): ICD-10-CM

## 2024-08-16 DIAGNOSIS — L30.9 DERMATITIS: ICD-10-CM

## 2024-08-16 PROCEDURE — 99213 OFFICE O/P EST LOW 20 MIN: CPT | Performed by: NURSE PRACTITIONER

## 2024-08-16 RX ORDER — TRAZODONE HYDROCHLORIDE 100 MG/1
100 TABLET ORAL
Start: 2024-08-16

## 2024-08-16 RX ORDER — MOMETASONE FUROATE 1 MG/G
CREAM TOPICAL DAILY
Qty: 45 G | Refills: 1 | Status: SHIPPED | OUTPATIENT
Start: 2024-08-16

## 2024-08-16 NOTE — PROGRESS NOTES
"Ambulatory Visit  Name: Eliud Clarke      : 1990      MRN: [id number]  Encounter Provider: FARZANEH Padilla  Encounter Date: 2024   Encounter department: [facility] PRIMARY CARE    Assessment & Plan   1. Anxiety  -     traZODone (DESYREL) 100 mg tablet; Take 1 tablet (100 mg total) by mouth daily at bedtime  2. Alcohol withdrawal syndrome without complication (HCC)  3. Nicotine dependence due to vaping tobacco product  4. Alcohol use disorder, severe, dependence (HCC)  5. Dermatitis  -     mometasone (ELOCON) 0.1 % cream; Apply topically daily       History of Present Illness     Here for a follow-up and re-evaluation related to anxiety and ETOH use.  He returned to work on  and is doing well.  He is now taking klonopin prn for anxiety. He has tapered off the librium.  Continues with the buspar and paxil.  Continues to go to AA meetings.          Review of Systems   Constitutional: Negative.    Respiratory: Negative.     Cardiovascular: Negative.    Neurological: Negative.    Psychiatric/Behavioral:  Positive for sleep disturbance. The patient is nervous/anxious.    All other systems reviewed and are negative.      Objective     /72 (BP Location: Left arm, Patient Position: Sitting, Cuff Size: Standard)   Pulse 78   Ht 5' 11\" (1.803 m)   Wt 88 kg (194 lb)   SpO2 99%   BMI 27.06 kg/m²     Physical Exam  Constitutional:       Appearance: Normal appearance.   HENT:      Head: Normocephalic and atraumatic.   Cardiovascular:      Rate and Rhythm: Normal rate and regular rhythm.      Pulses:           Dorsalis pedis pulses are 2+ on the right side and 2+ on the left side.      Heart sounds: No murmur heard.     No gallop.   Pulmonary:      Effort: Pulmonary effort is normal. No respiratory distress.      Breath sounds: Normal breath sounds. No wheezing or rales.   Musculoskeletal:      Cervical back: Neck supple.   Feet:      Right foot:      Skin integrity: No " ulcer, skin breakdown, erythema, warmth, callus or dry skin.      Left foot:      Skin integrity: No ulcer, skin breakdown, erythema, warmth, callus or dry skin.   Neurological:      General: No focal deficit present.      Mental Status: He is alert and oriented to person, place, and time. Mental status is at baseline.   Psychiatric:         Mood and Affect: Mood normal.         Behavior: Behavior normal.         Thought Content: Thought content normal.         Judgment: Judgment normal.       Administrative Statements

## 2024-10-06 DIAGNOSIS — F10.930 ALCOHOL WITHDRAWAL SYNDROME WITHOUT COMPLICATION (HCC): ICD-10-CM

## 2024-10-06 DIAGNOSIS — F41.9 ANXIETY: ICD-10-CM

## 2024-10-06 DIAGNOSIS — F51.04 PSYCHOPHYSIOLOGICAL INSOMNIA: ICD-10-CM

## 2024-10-08 RX ORDER — CLONAZEPAM 0.5 MG/1
0.5 TABLET ORAL 2 TIMES DAILY PRN
Qty: 40 TABLET | Refills: 0 | Status: SHIPPED | OUTPATIENT
Start: 2024-10-08

## 2024-10-08 RX ORDER — TRAZODONE HYDROCHLORIDE 100 MG/1
100 TABLET ORAL
Qty: 90 TABLET | Refills: 0 | Status: SHIPPED | OUTPATIENT
Start: 2024-10-08

## 2024-10-08 NOTE — TELEPHONE ENCOUNTER
Requested medication(s) are due for refill today: Yes  Patient has already received a courtesy refill: No  Other reason request has been forwarded to provider:    (4) excellent

## 2024-10-17 DIAGNOSIS — F41.9 ANXIETY: ICD-10-CM

## 2024-10-17 RX ORDER — PAROXETINE HYDROCHLORIDE HEMIHYDRATE 25 MG/1
25 TABLET, FILM COATED, EXTENDED RELEASE ORAL EVERY MORNING
Qty: 90 TABLET | Refills: 1 | Status: SHIPPED | OUTPATIENT
Start: 2024-10-17

## 2024-11-08 DIAGNOSIS — F41.9 ANXIETY: ICD-10-CM

## 2024-11-08 DIAGNOSIS — F10.930 ALCOHOL WITHDRAWAL SYNDROME WITHOUT COMPLICATION (HCC): ICD-10-CM

## 2024-11-08 DIAGNOSIS — F51.04 PSYCHOPHYSIOLOGICAL INSOMNIA: ICD-10-CM

## 2024-11-08 RX ORDER — CLONAZEPAM 0.5 MG/1
0.5 TABLET ORAL 2 TIMES DAILY PRN
Qty: 40 TABLET | Refills: 0 | Status: SHIPPED | OUTPATIENT
Start: 2024-11-08

## 2024-12-11 DIAGNOSIS — F51.04 PSYCHOPHYSIOLOGICAL INSOMNIA: ICD-10-CM

## 2024-12-11 DIAGNOSIS — F10.930 ALCOHOL WITHDRAWAL SYNDROME WITHOUT COMPLICATION (HCC): ICD-10-CM

## 2024-12-11 DIAGNOSIS — F41.9 ANXIETY: ICD-10-CM

## 2024-12-11 RX ORDER — CLONAZEPAM 0.5 MG/1
0.5 TABLET ORAL 2 TIMES DAILY PRN
Qty: 40 TABLET | Refills: 0 | Status: SHIPPED | OUTPATIENT
Start: 2024-12-11

## 2024-12-11 NOTE — TELEPHONE ENCOUNTER
Reason for call:   [x] Refill   [] Prior Auth  [] Other:     Office:   [x] PCP/Provider -   Ordering Department: [facility] PRIMARY CARE  Authorized By: FARZANEH Padilla  [] Specialty/Provider -     Medication: clonazePAM (KlonoPIN) 0.5 mg tablet     Dose/Frequency: Take 1 tablet (0.5 mg total) by mouth 2 (two) times a day as needed for seizures     Quantity: 40    Pharmacy: [facility]/pharmacy #[id number] [place] [address] [phone]    Does the patient have enough for 3 days?   [] Yes   [x] No - Send as HP to POD

## 2024-12-13 ENCOUNTER — DOCTOR'S OFFICE (OUTPATIENT)
Dept: URBAN - NONMETROPOLITAN AREA CLINIC 1 | Facility: CLINIC | Age: 34
Setting detail: OPHTHALMOLOGY
End: 2024-12-13
Payer: COMMERCIAL

## 2024-12-13 ENCOUNTER — OPTICAL OFFICE (OUTPATIENT)
Dept: URBAN - NONMETROPOLITAN AREA CLINIC 4 | Facility: CLINIC | Age: 34
Setting detail: OPHTHALMOLOGY
End: 2024-12-13
Payer: COMMERCIAL

## 2024-12-13 DIAGNOSIS — H52.13: ICD-10-CM

## 2024-12-13 PROBLEM — Z01.00 ENCOUNTER FOR EXAMINATION OF EYES AND VISION WITHOUT ABNORMAL FINDINGS 
- GOOD OCULAR HEALTH: Status: ACTIVE | Noted: 2024-12-13

## 2024-12-13 PROBLEM — H52.223 ASTIGMATISM, REGULAR; BOTH EYES: Status: ACTIVE | Noted: 2024-12-13

## 2024-12-13 PROCEDURE — S0500 DISPOS CONT LENS: HCPCS | Mod: RT

## 2024-12-13 PROCEDURE — 92015 DETERMINE REFRACTIVE STATE: CPT

## 2024-12-13 PROCEDURE — 92004 COMPRE OPH EXAM NEW PT 1/>: CPT

## 2024-12-13 PROCEDURE — S0500 DISPOS CONT LENS: HCPCS | Mod: LT

## 2024-12-13 PROCEDURE — 92310 CONTACT LENS FITTING OU: CPT

## 2024-12-13 ASSESSMENT — REFRACTION_AUTOREFRACTION
OS_AXIS: 004
OS_SPHERE: -1.00
OD_SPHERE: -0.75
OD_CYLINDER: -1.00
OD_AXIS: 173
OS_CYLINDER: -2.50

## 2024-12-13 ASSESSMENT — REFRACTION_MANIFEST
OS_AXIS: 005
OU_VA: 20/20
OD_VA1: 20/20
OS_VA2: 20/20
OD_AXIS: 160
OS_VA1: 20/20
OD_SPHERE: -1.00
OD_CYLINDER: -0.75
OS_SPHERE: -1.00
OD_VA2: 20/20
OS_CYLINDER: -1.75

## 2024-12-13 ASSESSMENT — TONOMETRY
OS_IOP_MMHG: 18
OD_IOP_MMHG: 18

## 2024-12-13 ASSESSMENT — CONFRONTATIONAL VISUAL FIELD TEST (CVF)
OD_FINDINGS: FULL
OS_FINDINGS: FULL

## 2024-12-13 ASSESSMENT — REFRACTION_CURRENTRX
OD_SPHERE: -1.00
OS_VPRISM_DIRECTION: SV
OS_CYLINDER: -1.75
OD_CYLINDER: -0.75
OS_AXIS: 007
OD_OVR_VA: 20/
OS_SPHERE: -1.25
OS_OVR_VA: 20/
OD_AXIS: 161
OD_VPRISM_DIRECTION: SV

## 2024-12-13 ASSESSMENT — VISUAL ACUITY
OD_BCVA: 20/20-2
OS_BCVA: 20/20

## 2024-12-19 ENCOUNTER — TELEPHONE (OUTPATIENT)
Dept: ADMINISTRATIVE | Facility: OTHER | Age: 34
End: 2024-12-19

## 2024-12-19 ENCOUNTER — OFFICE VISIT (OUTPATIENT)
Dept: FAMILY MEDICINE CLINIC | Facility: CLINIC | Age: 34
End: 2024-12-19
Payer: COMMERCIAL

## 2024-12-19 VITALS
HEART RATE: 79 BPM | OXYGEN SATURATION: 99 % | HEIGHT: 71 IN | BODY MASS INDEX: 26.88 KG/M2 | SYSTOLIC BLOOD PRESSURE: 112 MMHG | DIASTOLIC BLOOD PRESSURE: 68 MMHG | WEIGHT: 192 LBS

## 2024-12-19 DIAGNOSIS — F10.20 ALCOHOL USE DISORDER, SEVERE, DEPENDENCE (HCC): ICD-10-CM

## 2024-12-19 DIAGNOSIS — F10.930 ALCOHOL WITHDRAWAL SYNDROME WITHOUT COMPLICATION (HCC): Primary | ICD-10-CM

## 2024-12-19 DIAGNOSIS — F41.9 ANXIETY: ICD-10-CM

## 2024-12-19 DIAGNOSIS — Z23 ENCOUNTER FOR IMMUNIZATION: ICD-10-CM

## 2024-12-19 PROCEDURE — 99214 OFFICE O/P EST MOD 30 MIN: CPT | Performed by: NURSE PRACTITIONER

## 2024-12-19 PROCEDURE — 90471 IMMUNIZATION ADMIN: CPT | Performed by: NURSE PRACTITIONER

## 2024-12-19 PROCEDURE — 90656 IIV3 VACC NO PRSV 0.5 ML IM: CPT | Performed by: NURSE PRACTITIONER

## 2024-12-19 RX ORDER — TRAZODONE HYDROCHLORIDE 100 MG/1
100 TABLET ORAL
Qty: 90 TABLET | Refills: 3 | Status: SHIPPED | OUTPATIENT
Start: 2024-12-19

## 2024-12-19 NOTE — TELEPHONE ENCOUNTER
Upon review of the In Basket request we were able to locate, review, and update the patient chart as requested for Hepatitis C .    Any additional questions or concerns should be emailed to the Practice Liaisons via the appropriate education email address, please do not reply via In Basket.    Thank you  Candelaria Myers MA   [facility]

## 2024-12-19 NOTE — PROGRESS NOTES
"Name: Eliud Clarke      : 1990      MRN: [id number]  Encounter Provider: FARZANEH Padilla  Encounter Date: 2024   Encounter department: [facility] PRIMARY CARE  :  Assessment & Plan  Alcohol withdrawal syndrome without complication (HCC)    Orders:  •  CBC and differential; Future  •  Comprehensive metabolic panel; Future  •  Amylase; Future  •  Lipase; Future  •  Hemoglobin A1C; Future    Alcohol use disorder, severe, dependence (HCC)  Reports several set backs with ETOH use - will get blood work to assess liver function etc.    Orders:  •  CBC and differential; Future  •  Comprehensive metabolic panel; Future  •  Amylase; Future  •  Lipase; Future  •  Hemoglobin A1C; Future    Anxiety  Continues with Buspar.    Orders:  •  traZODone (DESYREL) 100 mg tablet; Take 1 tablet (100 mg total) by mouth daily at bedtime    Encounter for immunization    Orders:  •  influenza vaccine preservative-free 0.5 mL IM (Fluzone, Afluria, Fluarix, Flulaval)           History of Present Illness     Here today for a follow-up.  Hx of anxiety and ETOH use.  Had been sober for several months but reports today that he has had several set back recently.  He usually binge drinks for 6-7 days and then either stops, or passes out.  He would like to have labs done today due to these recent binges.        Review of Systems   Constitutional: Negative.    Respiratory: Negative.     Cardiovascular: Negative.    All other systems reviewed and are negative.      Objective   /68 (BP Location: Left arm, Patient Position: Sitting, Cuff Size: Standard)   Pulse 79   Ht 5' 11\" (1.803 m)   Wt 87.1 kg (192 lb)   SpO2 99%   BMI 26.78 kg/m²      Physical Exam  Cardiovascular:      Rate and Rhythm: Normal rate and regular rhythm.   Pulmonary:      Effort: Pulmonary effort is normal.      Breath sounds: Normal breath sounds.   Neurological:      Mental Status: He is alert and oriented to person, place, and " time.

## 2024-12-19 NOTE — TELEPHONE ENCOUNTER
----- Message from FARZANEH Padilla sent at 12/19/2024 10:24 AM EST -----  Regarding: Hep C  Hello, our patient attached above has had Hep C screening                completed/performed. Please assist in updating the patient chart by pulling the Care Everywhere (CE) document. The date of service is 04/22/2022.      Thanks!

## 2024-12-19 NOTE — ASSESSMENT & PLAN NOTE
Continues with Buspar.    Orders:  •  traZODone (DESYREL) 100 mg tablet; Take 1 tablet (100 mg total) by mouth daily at bedtime

## 2024-12-19 NOTE — ASSESSMENT & PLAN NOTE
Orders:  •  CBC and differential; Future  •  Comprehensive metabolic panel; Future  •  Amylase; Future  •  Lipase; Future  •  Hemoglobin A1C; Future

## 2024-12-19 NOTE — ASSESSMENT & PLAN NOTE
Reports several set backs with ETOH use - will get blood work to assess liver function etc.    Orders:  •  CBC and differential; Future  •  Comprehensive metabolic panel; Future  •  Amylase; Future  •  Lipase; Future  •  Hemoglobin A1C; Future

## 2024-12-23 ENCOUNTER — APPOINTMENT (OUTPATIENT)
Dept: LAB | Facility: HOSPITAL | Age: 34
End: 2024-12-23
Payer: COMMERCIAL

## 2024-12-23 DIAGNOSIS — F10.20 ALCOHOL USE DISORDER, SEVERE, DEPENDENCE (HCC): ICD-10-CM

## 2024-12-23 DIAGNOSIS — F10.930 ALCOHOL WITHDRAWAL SYNDROME WITHOUT COMPLICATION (HCC): ICD-10-CM

## 2024-12-23 LAB
ALBUMIN SERPL BCG-MCNC: 4.4 G/DL (ref 3.5–5)
ALP SERPL-CCNC: 38 U/L (ref 34–104)
ALT SERPL W P-5'-P-CCNC: 22 U/L (ref 7–52)
AMYLASE SERPL-CCNC: 95 IU/L (ref 29–103)
ANION GAP SERPL CALCULATED.3IONS-SCNC: 5 MMOL/L (ref 4–13)
AST SERPL W P-5'-P-CCNC: 16 U/L (ref 13–39)
BASOPHILS # BLD AUTO: 0.06 THOUSANDS/ÂΜL (ref 0–0.1)
BASOPHILS NFR BLD AUTO: 1 % (ref 0–1)
BILIRUB SERPL-MCNC: 0.43 MG/DL (ref 0.2–1)
BUN SERPL-MCNC: 15 MG/DL (ref 5–25)
CALCIUM SERPL-MCNC: 9 MG/DL (ref 8.4–10.2)
CHLORIDE SERPL-SCNC: 106 MMOL/L (ref 96–108)
CO2 SERPL-SCNC: 29 MMOL/L (ref 21–32)
CREAT SERPL-MCNC: 0.91 MG/DL (ref 0.6–1.3)
EOSINOPHIL # BLD AUTO: 0.1 THOUSAND/ÂΜL (ref 0–0.61)
EOSINOPHIL NFR BLD AUTO: 2 % (ref 0–6)
ERYTHROCYTE [DISTWIDTH] IN BLOOD BY AUTOMATED COUNT: 13.2 % (ref 11.6–15.1)
EST. AVERAGE GLUCOSE BLD GHB EST-MCNC: 94 MG/DL
GFR SERPL CREATININE-BSD FRML MDRD: 111 ML/MIN/1.73SQ M
GLUCOSE P FAST SERPL-MCNC: 100 MG/DL (ref 65–99)
HBA1C MFR BLD: 4.9 %
HCT VFR BLD AUTO: 45.9 % (ref 36.5–49.3)
HGB BLD-MCNC: 15.5 G/DL (ref 12–17)
IMM GRANULOCYTES # BLD AUTO: 0.02 THOUSAND/UL (ref 0–0.2)
IMM GRANULOCYTES NFR BLD AUTO: 0 % (ref 0–2)
LIPASE SERPL-CCNC: 50 U/L (ref 11–82)
LYMPHOCYTES # BLD AUTO: 2.09 THOUSANDS/ÂΜL (ref 0.6–4.47)
LYMPHOCYTES NFR BLD AUTO: 41 % (ref 14–44)
MCH RBC QN AUTO: 32 PG (ref 26.8–34.3)
MCHC RBC AUTO-ENTMCNC: 33.8 G/DL (ref 31.4–37.4)
MCV RBC AUTO: 95 FL (ref 82–98)
MONOCYTES # BLD AUTO: 0.56 THOUSAND/ÂΜL (ref 0.17–1.22)
MONOCYTES NFR BLD AUTO: 11 % (ref 4–12)
NEUTROPHILS # BLD AUTO: 2.29 THOUSANDS/ÂΜL (ref 1.85–7.62)
NEUTS SEG NFR BLD AUTO: 45 % (ref 43–75)
NRBC BLD AUTO-RTO: 0 /100 WBCS
PLATELET # BLD AUTO: 253 THOUSANDS/UL (ref 149–390)
PMV BLD AUTO: 8.4 FL (ref 8.9–12.7)
POTASSIUM SERPL-SCNC: 4.1 MMOL/L (ref 3.5–5.3)
PROT SERPL-MCNC: 7 G/DL (ref 6.4–8.4)
RBC # BLD AUTO: 4.85 MILLION/UL (ref 3.88–5.62)
SODIUM SERPL-SCNC: 140 MMOL/L (ref 135–147)
WBC # BLD AUTO: 5.12 THOUSAND/UL (ref 4.31–10.16)

## 2024-12-23 PROCEDURE — 36415 COLL VENOUS BLD VENIPUNCTURE: CPT

## 2024-12-23 PROCEDURE — 85025 COMPLETE CBC W/AUTO DIFF WBC: CPT

## 2024-12-23 PROCEDURE — 83036 HEMOGLOBIN GLYCOSYLATED A1C: CPT

## 2024-12-23 PROCEDURE — 80053 COMPREHEN METABOLIC PANEL: CPT

## 2024-12-23 PROCEDURE — 83690 ASSAY OF LIPASE: CPT

## 2024-12-23 PROCEDURE — 82150 ASSAY OF AMYLASE: CPT

## 2024-12-24 ENCOUNTER — RESULTS FOLLOW-UP (OUTPATIENT)
Dept: FAMILY MEDICINE CLINIC | Facility: CLINIC | Age: 34
End: 2024-12-24

## 2025-01-23 DIAGNOSIS — F41.9 ANXIETY: ICD-10-CM

## 2025-01-23 DIAGNOSIS — F10.930 ALCOHOL WITHDRAWAL SYNDROME WITHOUT COMPLICATION (HCC): ICD-10-CM

## 2025-01-23 DIAGNOSIS — F51.04 PSYCHOPHYSIOLOGICAL INSOMNIA: ICD-10-CM

## 2025-01-23 RX ORDER — CLONAZEPAM 0.5 MG/1
0.5 TABLET ORAL 2 TIMES DAILY PRN
Qty: 40 TABLET | Refills: 0 | Status: SHIPPED | OUTPATIENT
Start: 2025-01-23

## 2025-01-23 RX ORDER — CLONAZEPAM 0.5 MG/1
0.5 TABLET ORAL 2 TIMES DAILY PRN
Qty: 40 TABLET | Refills: 0 | OUTPATIENT
Start: 2025-01-23

## 2025-01-23 NOTE — TELEPHONE ENCOUNTER
Reason for call:   [x] Refill   [] Prior Auth  [] Other:     Office:   [x] PCP/Provider -   [] Specialty/Provider -     Medication: Clonazepam     Dose/Frequency: 0.5 mg tablet. Take 1 tablet by mouth 2x daily PRN for seizures.    Quantity: 40    Pharmacy: [facility]/pharmacy #[id number] [address] [phone]     Does the patient have enough for 3 days?   [] Yes   [x] No - Send as HP to POD

## 2025-02-13 DIAGNOSIS — F51.04 PSYCHOPHYSIOLOGICAL INSOMNIA: ICD-10-CM

## 2025-02-13 DIAGNOSIS — F41.9 ANXIETY: ICD-10-CM

## 2025-02-13 DIAGNOSIS — F10.930 ALCOHOL WITHDRAWAL SYNDROME WITHOUT COMPLICATION (HCC): ICD-10-CM

## 2025-02-13 RX ORDER — BUSPIRONE HYDROCHLORIDE 10 MG/1
10 TABLET ORAL 3 TIMES DAILY
Qty: 90 TABLET | Refills: 2 | Status: SHIPPED | OUTPATIENT
Start: 2025-02-13

## 2025-02-13 RX ORDER — CLONAZEPAM 0.5 MG/1
0.5 TABLET ORAL 2 TIMES DAILY PRN
Qty: 40 TABLET | Refills: 0 | Status: SHIPPED | OUTPATIENT
Start: 2025-02-13

## 2025-03-05 DIAGNOSIS — F51.04 PSYCHOPHYSIOLOGICAL INSOMNIA: ICD-10-CM

## 2025-03-05 DIAGNOSIS — F41.9 ANXIETY: ICD-10-CM

## 2025-03-05 DIAGNOSIS — F10.930 ALCOHOL WITHDRAWAL SYNDROME WITHOUT COMPLICATION (HCC): ICD-10-CM

## 2025-03-06 RX ORDER — CLONAZEPAM 0.5 MG/1
0.5 TABLET ORAL 2 TIMES DAILY PRN
Qty: 40 TABLET | Refills: 0 | Status: SHIPPED | OUTPATIENT
Start: 2025-03-06

## 2025-03-27 DIAGNOSIS — F51.04 PSYCHOPHYSIOLOGICAL INSOMNIA: ICD-10-CM

## 2025-03-27 DIAGNOSIS — F41.9 ANXIETY: ICD-10-CM

## 2025-03-27 RX ORDER — BUSPIRONE HYDROCHLORIDE 10 MG/1
10 TABLET ORAL 3 TIMES DAILY
Qty: 270 TABLET | Refills: 0 | Status: SHIPPED | OUTPATIENT
Start: 2025-03-27

## 2025-04-08 DIAGNOSIS — F41.9 ANXIETY: ICD-10-CM

## 2025-04-08 DIAGNOSIS — F10.930 ALCOHOL WITHDRAWAL SYNDROME WITHOUT COMPLICATION (HCC): ICD-10-CM

## 2025-04-08 DIAGNOSIS — F51.04 PSYCHOPHYSIOLOGICAL INSOMNIA: ICD-10-CM

## 2025-04-09 RX ORDER — CLONAZEPAM 0.5 MG/1
0.5 TABLET ORAL 2 TIMES DAILY PRN
Qty: 40 TABLET | Refills: 0 | Status: SHIPPED | OUTPATIENT
Start: 2025-04-09

## 2025-04-22 DIAGNOSIS — F41.9 ANXIETY: ICD-10-CM

## 2025-04-22 RX ORDER — PAROXETINE HYDROCHLORIDE HEMIHYDRATE 25 MG/1
25 TABLET, FILM COATED, EXTENDED RELEASE ORAL EVERY MORNING
Qty: 90 TABLET | Refills: 1 | Status: SHIPPED | OUTPATIENT
Start: 2025-04-22

## 2025-05-09 DIAGNOSIS — F41.9 ANXIETY: ICD-10-CM

## 2025-05-09 DIAGNOSIS — F51.04 PSYCHOPHYSIOLOGICAL INSOMNIA: ICD-10-CM

## 2025-05-09 DIAGNOSIS — F10.930 ALCOHOL WITHDRAWAL SYNDROME WITHOUT COMPLICATION (HCC): ICD-10-CM

## 2025-05-09 RX ORDER — CLONAZEPAM 0.5 MG/1
0.5 TABLET ORAL 2 TIMES DAILY PRN
Qty: 40 TABLET | Refills: 0 | Status: SHIPPED | OUTPATIENT
Start: 2025-05-09